# Patient Record
Sex: MALE | Race: BLACK OR AFRICAN AMERICAN | NOT HISPANIC OR LATINO | Employment: UNEMPLOYED | URBAN - METROPOLITAN AREA
[De-identification: names, ages, dates, MRNs, and addresses within clinical notes are randomized per-mention and may not be internally consistent; named-entity substitution may affect disease eponyms.]

---

## 2020-06-14 ENCOUNTER — MEDICAL CORRESPONDENCE (OUTPATIENT)
Dept: HEALTH INFORMATION MANAGEMENT | Facility: CLINIC | Age: 35
End: 2020-06-14

## 2020-06-25 ENCOUNTER — APPOINTMENT (OUTPATIENT)
Dept: ULTRASOUND IMAGING | Facility: CLINIC | Age: 35
End: 2020-06-25
Attending: INTERNAL MEDICINE
Payer: MEDICAID

## 2020-06-25 ENCOUNTER — APPOINTMENT (OUTPATIENT)
Dept: CARDIOLOGY | Facility: CLINIC | Age: 35
End: 2020-06-25
Attending: INTERNAL MEDICINE
Payer: MEDICAID

## 2020-06-25 ENCOUNTER — HOSPITAL ENCOUNTER (INPATIENT)
Facility: CLINIC | Age: 35
LOS: 5 days | Discharge: HOME | End: 2020-06-30
Attending: INTERNAL MEDICINE | Admitting: INTERNAL MEDICINE
Payer: MEDICAID

## 2020-06-25 DIAGNOSIS — N17.9 ACUTE KIDNEY INJURY (H): ICD-10-CM

## 2020-06-25 DIAGNOSIS — F10.20 ALCOHOL USE DISORDER, SEVERE, DEPENDENCE (H): Primary | ICD-10-CM

## 2020-06-25 PROBLEM — K70.11 ALCOHOLIC HEPATITIS WITH ASCITES (H): Status: ACTIVE | Noted: 2020-06-25

## 2020-06-25 LAB
ALBUMIN SERPL-MCNC: 1.6 G/DL (ref 3.4–5)
ALBUMIN SERPL-MCNC: 1.6 G/DL (ref 3.4–5)
ALBUMIN UR-MCNC: 30 MG/DL
ALP SERPL-CCNC: 377 U/L (ref 40–150)
ALT SERPL W P-5'-P-CCNC: 26 U/L (ref 0–70)
ANION GAP SERPL CALCULATED.3IONS-SCNC: 11 MMOL/L (ref 3–14)
APPEARANCE UR: ABNORMAL
AST SERPL W P-5'-P-CCNC: 101 U/L (ref 0–45)
BACTERIA #/AREA URNS HPF: ABNORMAL /HPF
BASOPHILS # BLD AUTO: 0.1 10E9/L (ref 0–0.2)
BASOPHILS NFR BLD AUTO: 0.4 %
BILIRUB SERPL-MCNC: 23.1 MG/DL (ref 0.2–1.3)
BILIRUB UR QL STRIP: ABNORMAL
BUN SERPL-MCNC: 56 MG/DL (ref 7–30)
CALCIUM SERPL-MCNC: 7.7 MG/DL (ref 8.5–10.1)
CHLORIDE SERPL-SCNC: 93 MMOL/L (ref 94–109)
CO2 SERPL-SCNC: 24 MMOL/L (ref 20–32)
COLOR UR AUTO: ABNORMAL
CREAT SERPL-MCNC: 4.86 MG/DL (ref 0.66–1.25)
DIFFERENTIAL METHOD BLD: ABNORMAL
EOSINOPHIL # BLD AUTO: 0.4 10E9/L (ref 0–0.7)
EOSINOPHIL NFR BLD AUTO: 1.7 %
ERYTHROCYTE [DISTWIDTH] IN BLOOD BY AUTOMATED COUNT: 17.6 % (ref 10–15)
GFR SERPL CREATININE-BSD FRML MDRD: 15 ML/MIN/{1.73_M2}
GLUCOSE SERPL-MCNC: 114 MG/DL (ref 70–99)
GLUCOSE UR STRIP-MCNC: NEGATIVE MG/DL
HCT VFR BLD AUTO: 26.2 % (ref 40–53)
HGB BLD-MCNC: 9.2 G/DL (ref 13.3–17.7)
HGB UR QL STRIP: ABNORMAL
HYALINE CASTS #/AREA URNS LPF: 9 /LPF (ref 0–2)
IMM GRANULOCYTES # BLD: 0.3 10E9/L (ref 0–0.4)
IMM GRANULOCYTES NFR BLD: 1 %
INR PPP: 1.9 (ref 0.86–1.14)
KETONES UR STRIP-MCNC: NEGATIVE MG/DL
LEUKOCYTE ESTERASE UR QL STRIP: ABNORMAL
LYMPHOCYTES # BLD AUTO: 1.2 10E9/L (ref 0.8–5.3)
LYMPHOCYTES NFR BLD AUTO: 4.8 %
MAGNESIUM SERPL-MCNC: 3.2 MG/DL (ref 1.6–2.3)
MCH RBC QN AUTO: 34.3 PG (ref 26.5–33)
MCHC RBC AUTO-ENTMCNC: 35.1 G/DL (ref 31.5–36.5)
MCV RBC AUTO: 98 FL (ref 78–100)
MONOCYTES # BLD AUTO: 1.7 10E9/L (ref 0–1.3)
MONOCYTES NFR BLD AUTO: 6.7 %
MUCOUS THREADS #/AREA URNS LPF: PRESENT /LPF
NEUTROPHILS # BLD AUTO: 21.5 10E9/L (ref 1.6–8.3)
NEUTROPHILS NFR BLD AUTO: 85.4 %
NITRATE UR QL: NEGATIVE
NRBC # BLD AUTO: 0 10*3/UL
NRBC BLD AUTO-RTO: 0 /100
NT-PROBNP SERPL-MCNC: 39 PG/ML (ref 0–450)
PH UR STRIP: 5.5 PH (ref 5–7)
PHOSPHATE SERPL-MCNC: 4.7 MG/DL (ref 2.5–4.5)
PLATELET # BLD AUTO: 290 10E9/L (ref 150–450)
POTASSIUM SERPL-SCNC: 3 MMOL/L (ref 3.4–5.3)
PROT SERPL-MCNC: 5 G/DL (ref 6.8–8.8)
PROT UR-MCNC: 0.91 G/L
PROT/CREAT 24H UR: 0.54 G/G CR (ref 0–0.2)
RBC # BLD AUTO: 2.68 10E12/L (ref 4.4–5.9)
RBC #/AREA URNS AUTO: <1 /HPF (ref 0–2)
SODIUM SERPL-SCNC: 128 MMOL/L (ref 133–144)
SODIUM UR-SCNC: 9 MMOL/L
SOURCE: ABNORMAL
SP GR UR STRIP: 1.02 (ref 1–1.03)
SQUAMOUS #/AREA URNS AUTO: 2 /HPF (ref 0–1)
UROBILINOGEN UR STRIP-MCNC: 4 MG/DL (ref 0–2)
WBC # BLD AUTO: 25.1 10E9/L (ref 4–11)
WBC #/AREA URNS AUTO: 19 /HPF (ref 0–5)

## 2020-06-25 PROCEDURE — 25000132 ZZH RX MED GY IP 250 OP 250 PS 637: Performed by: STUDENT IN AN ORGANIZED HEALTH CARE EDUCATION/TRAINING PROGRAM

## 2020-06-25 PROCEDURE — 84300 ASSAY OF URINE SODIUM: CPT | Performed by: INTERNAL MEDICINE

## 2020-06-25 PROCEDURE — 12000004 ZZH R&B IMCU UMMC

## 2020-06-25 PROCEDURE — 84132 ASSAY OF SERUM POTASSIUM: CPT | Performed by: INTERNAL MEDICINE

## 2020-06-25 PROCEDURE — 25800030 ZZH RX IP 258 OP 636: Performed by: STUDENT IN AN ORGANIZED HEALTH CARE EDUCATION/TRAINING PROGRAM

## 2020-06-25 PROCEDURE — 36415 COLL VENOUS BLD VENIPUNCTURE: CPT | Performed by: STUDENT IN AN ORGANIZED HEALTH CARE EDUCATION/TRAINING PROGRAM

## 2020-06-25 PROCEDURE — 85610 PROTHROMBIN TIME: CPT | Performed by: STUDENT IN AN ORGANIZED HEALTH CARE EDUCATION/TRAINING PROGRAM

## 2020-06-25 PROCEDURE — 80053 COMPREHEN METABOLIC PANEL: CPT | Performed by: STUDENT IN AN ORGANIZED HEALTH CARE EDUCATION/TRAINING PROGRAM

## 2020-06-25 PROCEDURE — 25000128 H RX IP 250 OP 636: Performed by: STUDENT IN AN ORGANIZED HEALTH CARE EDUCATION/TRAINING PROGRAM

## 2020-06-25 PROCEDURE — 93306 TTE W/DOPPLER COMPLETE: CPT

## 2020-06-25 PROCEDURE — 93975 VASCULAR STUDY: CPT | Mod: TC

## 2020-06-25 PROCEDURE — 82040 ASSAY OF SERUM ALBUMIN: CPT | Performed by: INTERNAL MEDICINE

## 2020-06-25 PROCEDURE — 83735 ASSAY OF MAGNESIUM: CPT | Performed by: STUDENT IN AN ORGANIZED HEALTH CARE EDUCATION/TRAINING PROGRAM

## 2020-06-25 PROCEDURE — P9047 ALBUMIN (HUMAN), 25%, 50ML: HCPCS | Performed by: STUDENT IN AN ORGANIZED HEALTH CARE EDUCATION/TRAINING PROGRAM

## 2020-06-25 PROCEDURE — 83880 ASSAY OF NATRIURETIC PEPTIDE: CPT | Performed by: STUDENT IN AN ORGANIZED HEALTH CARE EDUCATION/TRAINING PROGRAM

## 2020-06-25 PROCEDURE — 81001 URINALYSIS AUTO W/SCOPE: CPT | Performed by: INTERNAL MEDICINE

## 2020-06-25 PROCEDURE — 85025 COMPLETE CBC W/AUTO DIFF WBC: CPT | Performed by: STUDENT IN AN ORGANIZED HEALTH CARE EDUCATION/TRAINING PROGRAM

## 2020-06-25 PROCEDURE — 93306 TTE W/DOPPLER COMPLETE: CPT | Mod: 26 | Performed by: INTERNAL MEDICINE

## 2020-06-25 PROCEDURE — 36415 COLL VENOUS BLD VENIPUNCTURE: CPT | Performed by: INTERNAL MEDICINE

## 2020-06-25 PROCEDURE — 84100 ASSAY OF PHOSPHORUS: CPT | Performed by: STUDENT IN AN ORGANIZED HEALTH CARE EDUCATION/TRAINING PROGRAM

## 2020-06-25 PROCEDURE — 84156 ASSAY OF PROTEIN URINE: CPT | Performed by: INTERNAL MEDICINE

## 2020-06-25 RX ORDER — OXYCODONE HYDROCHLORIDE 5 MG/1
5 TABLET ORAL EVERY 4 HOURS PRN
Status: DISCONTINUED | OUTPATIENT
Start: 2020-06-25 | End: 2020-06-25

## 2020-06-25 RX ORDER — ONDANSETRON 2 MG/ML
4 INJECTION INTRAMUSCULAR; INTRAVENOUS EVERY 6 HOURS PRN
Status: DISCONTINUED | OUTPATIENT
Start: 2020-06-25 | End: 2020-06-30 | Stop reason: HOSPADM

## 2020-06-25 RX ORDER — PANTOPRAZOLE SODIUM 20 MG/1
40 TABLET, DELAYED RELEASE ORAL DAILY
COMMUNITY
Start: 2020-06-15 | End: 2024-05-30

## 2020-06-25 RX ORDER — AMOXICILLIN 500 MG/1
1000 CAPSULE ORAL 2 TIMES DAILY
Status: ON HOLD | COMMUNITY
Start: 2020-06-15 | End: 2020-06-30

## 2020-06-25 RX ORDER — NALOXONE HYDROCHLORIDE 0.4 MG/ML
.1-.4 INJECTION, SOLUTION INTRAMUSCULAR; INTRAVENOUS; SUBCUTANEOUS
Status: DISCONTINUED | OUTPATIENT
Start: 2020-06-25 | End: 2020-06-30 | Stop reason: HOSPADM

## 2020-06-25 RX ORDER — PANTOPRAZOLE SODIUM 40 MG/1
40 TABLET, DELAYED RELEASE ORAL DAILY
Status: DISCONTINUED | OUTPATIENT
Start: 2020-06-26 | End: 2020-06-30 | Stop reason: HOSPADM

## 2020-06-25 RX ORDER — LIDOCAINE 40 MG/G
CREAM TOPICAL
Status: DISCONTINUED | OUTPATIENT
Start: 2020-06-25 | End: 2020-06-30 | Stop reason: HOSPADM

## 2020-06-25 RX ORDER — ALBUMIN (HUMAN) 12.5 G/50ML
100 SOLUTION INTRAVENOUS EVERY 24 HOURS
Status: COMPLETED | OUTPATIENT
Start: 2020-06-25 | End: 2020-06-27

## 2020-06-25 RX ORDER — AMOXICILLIN 500 MG/1
500 CAPSULE ORAL 2 TIMES DAILY
Status: DISCONTINUED | OUTPATIENT
Start: 2020-06-25 | End: 2020-06-27

## 2020-06-25 RX ORDER — OXYCODONE HYDROCHLORIDE 5 MG/1
5-10 TABLET ORAL EVERY 4 HOURS PRN
Status: DISCONTINUED | OUTPATIENT
Start: 2020-06-25 | End: 2020-06-30 | Stop reason: HOSPADM

## 2020-06-25 RX ORDER — ACETAMINOPHEN 500 MG
500 TABLET ORAL EVERY 6 HOURS PRN
Status: DISCONTINUED | OUTPATIENT
Start: 2020-06-25 | End: 2020-06-30 | Stop reason: HOSPADM

## 2020-06-25 RX ORDER — COVID-19 ANTIGEN TEST
220 KIT MISCELLANEOUS PRN
Status: ON HOLD | COMMUNITY
End: 2020-06-30

## 2020-06-25 RX ORDER — POTASSIUM CHLORIDE 29.8 MG/ML
20 INJECTION INTRAVENOUS
Status: DISCONTINUED | OUTPATIENT
Start: 2020-06-25 | End: 2020-06-30 | Stop reason: HOSPADM

## 2020-06-25 RX ORDER — POTASSIUM CHLORIDE 1.5 G/1.58G
20-40 POWDER, FOR SOLUTION ORAL
Status: DISCONTINUED | OUTPATIENT
Start: 2020-06-25 | End: 2020-06-30 | Stop reason: HOSPADM

## 2020-06-25 RX ORDER — POTASSIUM CL/LIDO/0.9 % NACL 10MEQ/0.1L
10 INTRAVENOUS SOLUTION, PIGGYBACK (ML) INTRAVENOUS
Status: DISCONTINUED | OUTPATIENT
Start: 2020-06-25 | End: 2020-06-30 | Stop reason: HOSPADM

## 2020-06-25 RX ORDER — POTASSIUM CHLORIDE 7.45 MG/ML
10 INJECTION INTRAVENOUS
Status: DISCONTINUED | OUTPATIENT
Start: 2020-06-25 | End: 2020-06-30 | Stop reason: HOSPADM

## 2020-06-25 RX ORDER — POLYETHYLENE GLYCOL 3350 17 G/17G
17 POWDER, FOR SOLUTION ORAL DAILY PRN
Status: DISCONTINUED | OUTPATIENT
Start: 2020-06-25 | End: 2020-06-30 | Stop reason: HOSPADM

## 2020-06-25 RX ORDER — POTASSIUM CHLORIDE 750 MG/1
20-40 TABLET, EXTENDED RELEASE ORAL
Status: DISCONTINUED | OUTPATIENT
Start: 2020-06-25 | End: 2020-06-30 | Stop reason: HOSPADM

## 2020-06-25 RX ORDER — CLARITHROMYCIN 500 MG
500 TABLET ORAL 2 TIMES DAILY
Status: ON HOLD | COMMUNITY
Start: 2020-06-15 | End: 2020-06-30

## 2020-06-25 RX ORDER — ONDANSETRON 4 MG/1
4 TABLET, ORALLY DISINTEGRATING ORAL EVERY 6 HOURS PRN
Status: DISCONTINUED | OUTPATIENT
Start: 2020-06-25 | End: 2020-06-30 | Stop reason: HOSPADM

## 2020-06-25 RX ORDER — CLARITHROMYCIN 250 MG/1
250 TABLET, FILM COATED ORAL 2 TIMES DAILY
Status: DISCONTINUED | OUTPATIENT
Start: 2020-06-25 | End: 2020-06-27

## 2020-06-25 RX ADMIN — OXYCODONE HYDROCHLORIDE 5 MG: 5 TABLET ORAL at 16:04

## 2020-06-25 RX ADMIN — PHYTONADIONE 10 MG: 10 INJECTION, EMULSION INTRAMUSCULAR; INTRAVENOUS; SUBCUTANEOUS at 17:28

## 2020-06-25 RX ADMIN — POTASSIUM CHLORIDE 20 MEQ: 750 TABLET, EXTENDED RELEASE ORAL at 19:58

## 2020-06-25 RX ADMIN — ALBUMIN HUMAN 100 G: 0.25 SOLUTION INTRAVENOUS at 16:07

## 2020-06-25 RX ADMIN — OXYCODONE HYDROCHLORIDE 5 MG: 5 TABLET ORAL at 19:58

## 2020-06-25 RX ADMIN — POTASSIUM CHLORIDE 40 MEQ: 750 TABLET, EXTENDED RELEASE ORAL at 16:04

## 2020-06-25 RX ADMIN — OXYCODONE HYDROCHLORIDE 5 MG: 5 TABLET ORAL at 21:00

## 2020-06-25 RX ADMIN — CLARITHROMYCIN 250 MG: 250 TABLET ORAL at 19:58

## 2020-06-25 RX ADMIN — AMOXICILLIN 500 MG: 500 CAPSULE ORAL at 19:58

## 2020-06-25 ASSESSMENT — ACTIVITIES OF DAILY LIVING (ADL)
ADLS_ACUITY_SCORE: 10
ADLS_ACUITY_SCORE: 10

## 2020-06-25 ASSESSMENT — MIFFLIN-ST. JEOR: SCORE: 1948.13

## 2020-06-25 ASSESSMENT — PAIN DESCRIPTION - DESCRIPTORS
DESCRIPTORS: ACHING;CONSTANT
DESCRIPTORS: ACHING

## 2020-06-25 NOTE — PROGRESS NOTES
Psychosocial Assessment-Liver Transplant Team  Living Situation: Ian lives with his father Kory in an apartment in [place], [place].  Ian reports they have lived together for the past two years.  Ian' father drinks alcohol heavily, and this will be a difficult environment for patient to return to.  Ian reports he is independent with his personal cares and ambulation.  He does not drive due to past legal issues.  He denies being prescribed any medication prior to hospitalization, therefore difficult to assess his compliance with medication(s).  Education/Employment:  Ian graduated high school.  He is not a .  He had been working part-time at [employer] and part-time at [employer] as a .  Ian was laid off approximately six weeks ago due to COVID.  Financial /Income: Ian receives unemployment benefits.    Health Insurance:  Ian is currently uninsured, and has been since his health insurance lapsed in January of this year.  Ian reports he was previously on a [place] [facility] program.    An application for [place] [facility] Programs may have been started at [facility].  I have notified Bibi Rajput, [facility] , and she will follow up on this matter.  Family/Social Support: Ian has never  and he is not in a relationship.  He has an eight year old daughter who lives with her mother, and visits Ian on the weekends.      Ian reports his mother Bianca and sister Keke are his strongest supports, and who he would call upon for help.  His mother is at his bedside this afternoon.  Bianca and Keke live together and are moving to [place], [place] next week.  Chemical Dependency:  Ian smokes a pack of cigarettes every three days, and marijuana approximately once monthly.  He was vague in his report of alcohol consumption, but did report on average he was consuming alcohol five days of the week, and  "consuming 10 drinks of vodka per day up until two weeks ago.  Ian is unsure how long he had been consuming alcohol this heavily.  He reports no history of substance abuse treatment.  Ian states he has \"no interested in treatment,\" and further states \"I can do it on my own.\"  I asked him if he were willing to enter and complete a substance abuse treatment program if required by our transplant team, and he reports he will cross that bridge if or when he needs to.  Per Care Everywhere on 6/17/20: Alcohol use disorder  Patient reports heavy alcohol use, of 7-8 shots of alcohol 4-5x/week with recent decrease to about half of this amount. CIWA protocol in place while in hospital. No medications given, as scores were consistently 0. Patient declined Chemical Dependency and stated that he would seek services elsewhere.   - RECOMMENDATIONS: abstinence from all alcohol use   Mental Health: Ian denies any mental health history.  He specifically denies any history of suicidal ideation or hospitalization for mental health treatment.  Impression/Recommendations:   Ian does not understand the severity of his liver disease.  He has only abstained from alcohol for two weeks, and I would not recommend he be considered for liver transplantation given his poor incite and unwillingness to enter and complete a substance abuse treatment program.    This writer recommends Ian complete a substance use assessment and follow treatment recommendations to be considered for liver transplantation.  An inpatient consult has been ordered.  Additionally, an inpatient psychiatry consult has been ordered to further assess Ian' alcohol use history and mental health.      JAMES Quintero, [facility]  Liver Transplant   Phone [phone]  Pager [phone]   "

## 2020-06-25 NOTE — CONSULTS
Hepatology Consultation  Ian Crowley [id number] 34 year old 1985 6/25/2020        Date of Admission: 6/25/2020  Reason for consult: Alcoholic hepatitis  Requesting physician: Nate Dvual MD       Reason for Consultation:   Alcoholic hepatitis         Assessment and Plan:   Ian Crowley is a 34 year old male with PMHx of alcohol use disorder, tobacco use, HTN (not on anti-HTN) and gout who presented with jaundice, abdominal pain and renal injury.    #. Alcoholic Hepatitis:  #. Conjugated hyperbilirubinemia:   Patient presented to OSH with conjugated hyperbilirubinemia on 6/11 in the setting of alcohol overuse, likely secondary to alcohol related hepatitis. Abdominal US w/doppler without evidence of thrombosis - but coarse echotexture c/f fibrosis. Complications: ascites; no HE or EV/GV.  Workup thus far: Hepatitis A non-immune, not active. Hep B non-immune, not exposed. Hepatitis C non-reactive. F-actin and HERB wnl, thus less likely autoimmune etiology. Alpha 1 anti-trypsin elevated, no evidence of deficiency. Ferritin likely elevated 2/2 alcohol use. Ceruloplasmin wnl, unlikely Elvin disease.   MELD-Na: 40, 65% 90 day mortality    #. Ascites: SAAG < 1.1 on outside paracentetics (6/11) with total protein of 3.3, which is not typical of ascites due to portal HTN. 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     #. Transplant:   This is this patient's first known encounter with healthcare (6/2020). Based on the current information, this is the first presentation with liver disease related to alcohol use. He is a marginal liver transplant candidate in the setting of unclear insight into his AUD (especially given his family history of AUD and alcohol related cirrhosis).   Will need further evaluation per  to determine insight     #. Alcohol use  disorder: Ongoing alcohol use, 6/2020. No prior treatment. No history of DUIs. Family history of AUD.   #. Seizures, presumed 2/2 alcohol use  #. GERTRUDIS: SCr baseline 0.6-0.8, now SCr 5.11         Recommendations:   -- Recommend initiation of albumin challenge  -- F/u urine studies, including UA, urine Na and urine P/Cr; ordered for you  -- Repeat paracentesis (LESS THAN 2L given GERTRUDIS) with fluid studies: total protein, albumin, cell count w/diff and cultures; ordered for you  -- Recommend vitamin K IV 10mg x3 days (6/25-6/27)  -- Recommend TTE, please evaluate EF and IVC given at OSH was 55% and would expect a more hyperdynamic state  -- No indication for steroids at this time given SCr 5.11  -- Continue H. Pylori treatment  -- Transplant SW consulted to discuss: insight into alcohol use and treatment    Health Maintenance:  -- Hepatitis A and B non-immune, recommend initiation of vaccine initiation upon discharge    It has been a pleasure to participate in the care of this patient.  Patient discussed with Hepatology staff, Dr. Arnold.  Please do not hesitate to contact the Hepatology service with any questions or concerns.     Yolande Mota (Lizzie)  Gastroenterology/Hepatology Fellow  Pager [phone]    The patient was seen and examined.  The above assessment and plan was developed jointly with the fellow.      Moncho Arnold MD         HPI:   Ian Crowley is a 34 year old male with PMHx of alcohol use disorder, tobacco use, HTN (not on anti-HTN) and gout who presented with jaundice, abdominal pain and renal injury.  Starting on memorial day 2020, he started to have what was perceived to be seizures. He was having ongoing alcohol use, he was not trying to quit. No prior history of seizures, but father has epilepsy. During this episodes he was observed to have convulsions, emesis and urination. Last seizure was 6/7/2020. He is aware that his seizure are likely related to his alcohol use, but had ongoing  use.  He presented to [facility] on 6/11/2020 with abdominal discomfort and bloating for a week with nausea and dry heaving. He was having some streaks of blood with stool, red in color. He underwent a TTE, EEG, MRI brain, abdominal imaging and EGD as below. MRI brain without source of seizures. Abdominal imaging with fatty changes and concerns for cirrhosis, no clots. EGD + for H. Pylori, esophagitis and portal gastropathy; full report below. His SCr was wnl at that time. Tb was elevated to 14-18 and WBC was elevated to mid 20s with neutrophilic predominance. Blood cultures x2 with no growth. No e/o SBP on paracentesis fluid.    He presented again to an OSH with abdominal pain, distension and shortness of breath. S/p 4L paracentesis removed in the ED. Noted to have an GERTRUDIS to 5.1 with hypoNa. He has taken some PRN NSAIDs, but continues to urinate. Denies dysuria or hematuria. He was not on diuretics upon discharge from [facility]. Some nausea, but good PO intake and no recent emesis.        Past Medical History:   - HTN  - Gout  - Alcohol use disorder         Past Surgical History:   - No prior surgeries         Medications:     Medications Prior to Admission   Medication Sig Dispense Refill Last Dose     amoxicillin (AMOXIL) 500 MG capsule Take 1,000 mg by mouth 2 times daily        clarithromycin (BIAXIN) 500 MG tablet Take 500 mg by mouth 2 times daily        pantoprazole (PROTONIX) 20 MG EC tablet Take 40 mg by mouth daily   6/23/2020 at Unknown time     naproxen sodium 220 MG capsule Take 220 mg by mouth as needed   More than a month at Unknown time            Allergies   NKDA         Social History:   - Single, lives with father (with AUD)  - Drinking for 10+ years, drinks 10 shots (vodka + chaser) nightly, has been drinking more in the setting of COVID while he was off of work. No history of DUI. The longest period he has been off of alcohol for in the last 5 years was a week. Doesn't feel his alcohol use  "interferes with his daily life  - Tobacco use: 1 pack every 3 days  - Previously a  and sales associated, fired due to COVID; currently unemployed  - 2 sisters - one sister lives w/mother, he doesn't speak to the other sister  - Has an 8 year old daughter        Family History:   - Father: AUD  - Uncle: Alcohol related cirrhosis,   - No family history of liver disease or cancer         Review of Systems:   A complete 10 point review of systems was obtained.  Please see the HPI for pertinent positives and negatives.  All other systems were reviewed and were found to be negative.          Physical Exam:   VS:  /70 (BP Location: Left arm)   Pulse 90   Temp 98.8  F (37.1  C) (Oral)   Resp 18   Ht 1.803 m (5' 11\")   Wt 97.6 kg (215 lb 2.7 oz)   SpO2 100%   BMI 30.01 kg/m      Wt:   Wt Readings from Last 2 Encounters:   20 97.6 kg (215 lb 2.7 oz)      Constitutional: cooperative, pleasant, no acute distress  Eyes: Sclera icteric  HEENT: MMM  CV: RRR  Respiratory: CTAB  Abd: Obese, distended. Normoactive BS. No rebound or guarding  Skin: Jaundice. Palmar erythema  Talha: Trace Talha edema  Neuro: A&O x 3, No asterixis. No dysmetria with finger nose finger.          Laboratory:   2020:   Na: 127, SCr 5.11, Tb 23.8, Alk Phos 366, AST 84, ALT 22  INR: 2.09  WBC 26 (85% neutrophils), Hgb 9.0, plt 277    Peritoneal fluid :  , 20% neutrophils, total protein 2.5    Peritoneal fluid :  Total protein 3.3  Albumin 1.7 (serum albumin 2.7)         Imaging/Procedures/Studies:   Abdominal US w/doppler 20:  IMPRESSION:  1.  Fatty liver with coarse echotexture, correlate for fibrosis.  2.  Isolated gallbladder wall thickening  3.  trace ascites  4.  Normal abdominal liver duplex.    EGD 2020:  Impression:          - Normal upper third of esophagus and middle third of                        esophagus.                       - LA Grade A reflux esophagitis.                       - " Z-line regular.                       - Small hiatal hernia.                       - Diffuse moderately erythematous mucosa with few                        scattered small erosions was found in the gastric                        body and in the gastric antrum. Biopsies were taken                        with a cold forceps for Helicobacter pylori testing.                       - Portal hypertensive gastropathy.                       - Normal examined duodenum.                       - No esophageal or gastric varices                       - No old blood or current active bleeding noted  Recommendation:      - Await pathology results, treat and confirm                        eradication if H pylori positive                       - Avoid aspirin/NSAIDs                       - IV b.i.d. PPI while in house, p.o. once daily PPI                        on discharge                       - Reflux precautions                       - ETOH cessation                       - Other recommendations per GI consult note                       - GI will continue to follow    MR Abdominal Hepatobiliary Abd 6/12/2020:  1.  Enlarged, fatty liver. Smooth surface contour. No hepatic mass.  2.  Heterogeneous hepatic enhancement and mild periportal edema suggestive of acute hepatitis  3.  Mild splenomegaly  4.  Small volume ascites  5.  Indeterminate T11 vertebral body lesion, likely benign.    MR Brain w and w/o IV Contrast 6/12/20:  1.  No epileptogenic focus identified.  2.  Normal intra-axial contents.  3.  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  4.  The patient be asked to return for dedicated IAC images for further assessment.    TTE 6/13/2020:   1. Paroxysmal tachycardia with HRs up to 175 bpm during the study.     2. Calculated 3D LVEF 56 % . Normal LV size and systolic function.      Normal wall motion.     3. Normal RV size and function.     4. No significant valve disease.  During  tachycardia with HRs up to     160-175 bpm mean mitral gradient was 4 mm Hg, but normal with     slower HRs.     5. No prior study to compare.

## 2020-06-25 NOTE — PLAN OF CARE
"/67 (BP Location: Left arm)   Pulse 83   Temp 98  F (36.7  C) (Oral)   Resp 18   Ht 1.803 m (5' 11\")   Wt 97.6 kg (215 lb 2.7 oz)   SpO2 100%   BMI 30.01 kg/m      Neuro: A&Ox4.   Cardiac: SR. VSS.   Respiratory: Sating >95% on RA.  GI/: Waiting for pt to void. Need urine sample. No BM.   Diet/appetite: Tolerating low sodium diet.   Activity:  Assist of 1, up to chair and in halls.  Pain: At acceptable level on current regimen. Oxy given per MAR  Skin: No new deficits noted.  LDA's: PIVx1    Plan: Plan for para in the morning. ECHO and abdominal US tonight. Albumin challenge and vitamin k started. Continue with POC. Notify primary team with changes.    "

## 2020-06-25 NOTE — PROVIDER NOTIFICATION
DATE:  6/25/2020   TIME OF RECEIPT FROM LAB:  1620  LAB TEST:  Bilirubin Total  LAB VALUE:  23.1  RESULTS GIVEN WITH READ-BACK TO (PROVIDER):  Dr Laboy.   TIME LAB VALUE REPORTED TO PROVIDER:   7299 - No new orders at this time.

## 2020-06-25 NOTE — H&P
[facility], [place]    History and Physical - Sylvie 4 Service        Date of Admission:  6/25/2020    Assessment & Plan   34 year old male who is transferred from [facility] to the [facility] on 6/25 for further care by Hepatology team given rising Cr, total bilirubin, and increasing ascites.     Alcoholic hepatitis  Concern for alcoholic cirrhosis  Hyperbilirubinemia  Patient with first presentation with liver concerns on 6/11 with work up consistent with alcoholic hepatitis. RUQ with dopplers without PVT, but concerning for fibrosis. EGD did not show EV, but he had portal hypertensive gastropathy and H. pylori. He had paracentesis performed and was discharged on H. pylori triple therapy.   Presented again to [facility] with increased abdominal distension and pain. Found to have increased Cr to 5.1 (BL 0.8), increased bilirubin.     SAAG from outside labs of 1.1   MELD-NA of 40 (from OSH labs)    - Hepatology consulted, appreciate recs  - repeat paracentesis (less than 2L given GERTRUDIS) with fluid studies  - vitamin K 10 mg IV x 3 days (6/25 - 6/27)  - daily CMP, CBC and INR  - repeat TTE  - Transplant SW consulted (per GI)  - RUQ with dopplers  - immunization with Hep A/B upon discharge    GERTRUDIS  Hyponatremia  Concern for hepatorenal syndrome  Patient with reported decreased urine output since discharge from hospital on 6/15. He says he has been trying to drink plenty of fluids. Denies hematuria, dysuria. Given GERTRUDIS in setting of alcoholic hepatitis and likely cirrhosis, concern for hepatorenal syndrome.  - Hepatology consulted, appreciate recs  - Nephrology consulted, awaiting recs  - start 3 day albumin challenge with albumin 100 g daily x 3 days  - strict I/Os  - daily weights    Hypokalemia  Likely due to underlying liver disease and GERTRUDIS.   - potassium replacement protocol    H. pylori gastritis  - continue H. pylori treatment (PPI, amoxicillin, clarithromycin)    Severe  malnutrition   In the setting of underlying liver disease and poor oral nutrition.   Alb 1.6  - continue to monitor  - 2 gram sodium diet when able       Diet: NPO until after ultrasound  Fluids: None  DVT Prophylaxis: Pneumatic Compression Devices  Constantino Catheter: not present  Code Status: Full code         Disposition Plan   Expected discharge: 2 - 3 days, recommended to prior living arrangement once resolution of GERTRUDIS.  Entered: Derek Laboy MD 06/25/2020, 1:54 PM       The patient's care was discussed with the Attending Physician, Dr. Adán Ramon.    Derek Laboy MD  [address], [place]  Pager: [phone]  Please see sticky note for cross cover information  ______________________________________________________________________    Chief Complaint   Transferred from [facility] for alcoholic hepatitis with increasing WBCs, Cr, and total bilirubin.     History is obtained from the patient and the chart.     History of Present Illness   Ian Crowley is a 34 year old male who is transferred from [facility] to the [facility] on 6/25 for further care by Hepatology team given rising Cr, total bilirubin, and increasing ascites. Patient initially hospitalized at [facility] from 6/11 to 6/15 with concern of seizures that were felt could be due to his cutting back on alcohol use. He was also found to have alcoholic hepatitis complicated by ascites, esophagitis and portal hypertensive gastropathy. LFT pattern consistent with alcoholic hepatitis and additional work up negative for Hep A, B, C. F-actin, HERB and ceruloplasmin were unremarkable. He had and EGD that showed the esophagitis, portal hypertensive gastropathy, and H. pylori infection. He also had a RUQ w/dopplers that showed patent portal vein with antegrade flow. Patient was discharged to home where he noticed that his abdomen continued to get more distended and painful until he decided to  present to [facility] on 6/24.     At [facility], he was found to have an GERTRUDIS with Cr of 5.1 (was 0.8 on 6/14), increased bilirubin to 23.8 (was 18 on 6/14), increased WBC to 26 (was 23 on 6/14). Patient had a paracentesis on 6/24 with 4L taken off. Ascites labs were notable for SAAG of 1.1 and did not show SBP. Given, patient's complexity, it was felt he would be more appropriately served at either [facility] or [facility]. Patient was approved for transfer to [facility]. He was initially felt to not be a transplant candidate given his recent alcohol use.     On interview today at [facility], patient confirms that he presented to [facility] on 6/24 because of increasing abdominal distension and pain. This was relieved somewhat with paracentesis from 6/24, but he says his abdomen is already starting to feel more distended again. He denies SOB, chest pain, fevers, chills. He reports that he has had blood in his stools, but thinks it is because he has had constipation and has been straining a lot. He denies hematemesis.     He says he has been drinking alcohol since he was a teenager. He has never tried to stop drinking and has never been to a rehab program.        Review of Systems    The 10 point Review of Systems is negative other than noted in the HPI or here.     Past Medical History    I have reviewed this patient's medical history and updated it with pertinent information if needed.   No past medical history on file.       Past Surgical History   I have reviewed this patient's surgical history and updated it with pertinent information if needed.  No past surgical history on file.     Social History   I have reviewed this patient's social history and updated it with pertinent information if needed.   Patient smokes 0.5 PPD.   Was drinking 4-5 drinks/day most days of the week. Last drink >10 days ago.     Family History   I have reviewed this patient's family history and updated it with pertinent information if  needed.   No family history on file.    Prior to Admission Medications   None     Allergies   Allergies not on file    Physical Exam   Vital Signs: Temp: 98.8  F (37.1  C) Temp src: Oral BP: 102/70 Pulse: 90   Resp: 18 SpO2: 100 % O2 Device: None (Room air)    Weight: 215 lbs 2.7 oz    Gen: jaundiced male reclining in hospital bed in [facility], accompanied by his mother  HEENT: EOMI, icteric sclera  CV: RRR, nl S1/S2  Pulm: CTAB, non-labored breathing on RA  Abd: taut and distended, tender to palpation diffusely  Ext: wwp  Neuro: A&Ox3, no focal deficits    Data   Data reviewed today: I reviewed all medications, new labs and imaging results over the last 24 hours. I personally reviewed .

## 2020-06-25 NOTE — PROGRESS NOTES
Admission          6/25/2020  1:26 PM  -----------------------------------------------------------  Reason for admission: Alcoholic Cirrhosis and Kidney injury  Primary team notified of pt arrival.  Admitted from: [facility]  Via: stretcher  Accompanied by: alone  Belongings: Placed in closet; Medications sent to security  Admission Profile: complete  Teaching: orientation to unit and call light- call light within reach, call don't fall, use of console, meal times, when to call for the RN, and enforced importance of safety   Access: PIV  Telemetry:Placed on pt  Ht./Wt.: complete  Code Status verified on armband: yes  2 RN Skin Assessment Completed By: Sonia RN and Gracy RN    Bed surface reassessed with algorithm and charted: yes  New bed surface ordered: no    Pt status:    Temp:  [98.8  F (37.1  C)] 98.8  F (37.1  C)  Pulse:  [90] 90  Resp:  [18] 18  BP: (102)/(70) 102/70  SpO2:  [100 %] 100 %

## 2020-06-25 NOTE — CONSULTS
INTERVENTIONAL RADIOLOGY CONSULT    Ian Crowley is a 34 year old male with recent admission to [facility] for alcoholic withdrawal found to have ascites, esophagitis and portal hypertensive gastropathy seen on EGD now with increasing distention. IR has been consulted for diagnostic and therapeutic paracentesis with drain limit of 2 L.    Patient is on IR schedule tomorrow for a diagnostic and therapeutic paracentesis. Labs WNL for procedure. No NPO required. Consent will be done prior to procedure.     Discussed with Derek Laboy MD medicine.    Cody Brown PA-C  Interventional Radiology  [phone]

## 2020-06-26 ENCOUNTER — APPOINTMENT (OUTPATIENT)
Dept: INTERVENTIONAL RADIOLOGY/VASCULAR | Facility: CLINIC | Age: 35
End: 2020-06-26
Attending: PHYSICIAN ASSISTANT
Payer: MEDICAID

## 2020-06-26 LAB
ALBUMIN FLD-MCNC: 1.4 G/DL
ALBUMIN SERPL-MCNC: 2.7 G/DL (ref 3.4–5)
ALP SERPL-CCNC: 360 U/L (ref 40–150)
ALT SERPL W P-5'-P-CCNC: 25 U/L (ref 0–70)
ANION GAP SERPL CALCULATED.3IONS-SCNC: 10 MMOL/L (ref 3–14)
APPEARANCE FLD: CLEAR
AST SERPL W P-5'-P-CCNC: 113 U/L (ref 0–45)
BASOPHILS # BLD AUTO: 0.1 10E9/L (ref 0–0.2)
BASOPHILS NFR BLD AUTO: 0.4 %
BILIRUB SERPL-MCNC: 23.6 MG/DL (ref 0.2–1.3)
BUN SERPL-MCNC: 56 MG/DL (ref 7–30)
CALCIUM SERPL-MCNC: 8.1 MG/DL (ref 8.5–10.1)
CHLORIDE SERPL-SCNC: 95 MMOL/L (ref 94–109)
CO2 SERPL-SCNC: 24 MMOL/L (ref 20–32)
COLOR FLD: YELLOW
CREAT SERPL-MCNC: 4.17 MG/DL (ref 0.66–1.25)
DIFFERENTIAL METHOD BLD: ABNORMAL
EOSINOPHIL # BLD AUTO: 0.4 10E9/L (ref 0–0.7)
EOSINOPHIL NFR BLD AUTO: 1.9 %
ERYTHROCYTE [DISTWIDTH] IN BLOOD BY AUTOMATED COUNT: 18.4 % (ref 10–15)
GFR SERPL CREATININE-BSD FRML MDRD: 18 ML/MIN/{1.73_M2}
GLUCOSE SERPL-MCNC: 98 MG/DL (ref 70–99)
GRAM STN SPEC: NORMAL
HCT VFR BLD AUTO: 25.2 % (ref 40–53)
HGB BLD-MCNC: 8.5 G/DL (ref 13.3–17.7)
IMM GRANULOCYTES # BLD: 0.2 10E9/L (ref 0–0.4)
IMM GRANULOCYTES NFR BLD: 0.9 %
INR PPP: 1.84 (ref 0.86–1.14)
LYMPHOCYTES # BLD AUTO: 1.4 10E9/L (ref 0.8–5.3)
LYMPHOCYTES NFR BLD AUTO: 6.8 %
LYMPHOCYTES NFR FLD MANUAL: 16 %
MCH RBC QN AUTO: 33.6 PG (ref 26.5–33)
MCHC RBC AUTO-ENTMCNC: 33.7 G/DL (ref 31.5–36.5)
MCV RBC AUTO: 100 FL (ref 78–100)
MONOCYTES # BLD AUTO: 1.6 10E9/L (ref 0–1.3)
MONOCYTES NFR BLD AUTO: 7.8 %
NEUTROPHILS # BLD AUTO: 16.7 10E9/L (ref 1.6–8.3)
NEUTROPHILS NFR BLD AUTO: 82.2 %
NEUTS BAND NFR FLD MANUAL: 15 %
NRBC # BLD AUTO: 0 10*3/UL
NRBC BLD AUTO-RTO: 0 /100
OTHER CELLS FLD MANUAL: 69 %
PLATELET # BLD AUTO: 249 10E9/L (ref 150–450)
POTASSIUM SERPL-SCNC: 3.2 MMOL/L (ref 3.4–5.3)
POTASSIUM SERPL-SCNC: 3.5 MMOL/L (ref 3.4–5.3)
PROT FLD-MCNC: 2.8 G/DL
PROT SERPL-MCNC: 5.5 G/DL (ref 6.8–8.8)
RBC # BLD AUTO: 2.53 10E12/L (ref 4.4–5.9)
SODIUM SERPL-SCNC: 128 MMOL/L (ref 133–144)
SPECIMEN SOURCE FLD: NORMAL
SPECIMEN SOURCE: NORMAL
WBC # BLD AUTO: 20.3 10E9/L (ref 4–11)
WBC # FLD AUTO: 321 /UL

## 2020-06-26 PROCEDURE — 25000128 H RX IP 250 OP 636: Performed by: STUDENT IN AN ORGANIZED HEALTH CARE EDUCATION/TRAINING PROGRAM

## 2020-06-26 PROCEDURE — 99232 SBSQ HOSP IP/OBS MODERATE 35: CPT | Performed by: PSYCHIATRY & NEUROLOGY

## 2020-06-26 PROCEDURE — 0W9G3ZZ DRAINAGE OF PERITONEAL CAVITY, PERCUTANEOUS APPROACH: ICD-10-PCS | Performed by: PHYSICIAN ASSISTANT

## 2020-06-26 PROCEDURE — 88112 CYTOPATH CELL ENHANCE TECH: CPT | Performed by: INTERNAL MEDICINE

## 2020-06-26 PROCEDURE — 85610 PROTHROMBIN TIME: CPT | Performed by: STUDENT IN AN ORGANIZED HEALTH CARE EDUCATION/TRAINING PROGRAM

## 2020-06-26 PROCEDURE — 84157 ASSAY OF PROTEIN OTHER: CPT | Performed by: INTERNAL MEDICINE

## 2020-06-26 PROCEDURE — 40000893 ZZH STATISTIC PT IP EVAL DEFER

## 2020-06-26 PROCEDURE — 25000132 ZZH RX MED GY IP 250 OP 250 PS 637: Performed by: STUDENT IN AN ORGANIZED HEALTH CARE EDUCATION/TRAINING PROGRAM

## 2020-06-26 PROCEDURE — 25800030 ZZH RX IP 258 OP 636: Performed by: STUDENT IN AN ORGANIZED HEALTH CARE EDUCATION/TRAINING PROGRAM

## 2020-06-26 PROCEDURE — 25000125 ZZHC RX 250: Performed by: PHYSICIAN ASSISTANT

## 2020-06-26 PROCEDURE — 87015 SPECIMEN INFECT AGNT CONCNTJ: CPT | Performed by: INTERNAL MEDICINE

## 2020-06-26 PROCEDURE — 27210732 IR PARACENTESIS

## 2020-06-26 PROCEDURE — 40000894 ZZH STATISTIC OT IP EVAL DEFER: Performed by: OCCUPATIONAL THERAPIST

## 2020-06-26 PROCEDURE — 82042 OTHER SOURCE ALBUMIN QUAN EA: CPT | Performed by: INTERNAL MEDICINE

## 2020-06-26 PROCEDURE — 89051 BODY FLUID CELL COUNT: CPT | Performed by: INTERNAL MEDICINE

## 2020-06-26 PROCEDURE — 00000155 ZZHCL STATISTIC H-CELL BLOCK W/STAIN: Performed by: INTERNAL MEDICINE

## 2020-06-26 PROCEDURE — P9047 ALBUMIN (HUMAN), 25%, 50ML: HCPCS | Performed by: STUDENT IN AN ORGANIZED HEALTH CARE EDUCATION/TRAINING PROGRAM

## 2020-06-26 PROCEDURE — 87070 CULTURE OTHR SPECIMN AEROBIC: CPT | Performed by: INTERNAL MEDICINE

## 2020-06-26 PROCEDURE — 80053 COMPREHEN METABOLIC PANEL: CPT | Performed by: STUDENT IN AN ORGANIZED HEALTH CARE EDUCATION/TRAINING PROGRAM

## 2020-06-26 PROCEDURE — 36415 COLL VENOUS BLD VENIPUNCTURE: CPT | Performed by: STUDENT IN AN ORGANIZED HEALTH CARE EDUCATION/TRAINING PROGRAM

## 2020-06-26 PROCEDURE — 40000007 ZZH STATISTIC ADULT CD FACE TO FACE-NO CHRG

## 2020-06-26 PROCEDURE — 00000102 ZZHCL STATISTIC CYTO WRIGHT STAIN TC: Performed by: INTERNAL MEDICINE

## 2020-06-26 PROCEDURE — 87205 SMEAR GRAM STAIN: CPT | Performed by: INTERNAL MEDICINE

## 2020-06-26 PROCEDURE — 12000004 ZZH R&B IMCU UMMC

## 2020-06-26 PROCEDURE — 85025 COMPLETE CBC W/AUTO DIFF WBC: CPT | Performed by: STUDENT IN AN ORGANIZED HEALTH CARE EDUCATION/TRAINING PROGRAM

## 2020-06-26 PROCEDURE — 88305 TISSUE EXAM BY PATHOLOGIST: CPT | Performed by: INTERNAL MEDICINE

## 2020-06-26 PROCEDURE — 87075 CULTR BACTERIA EXCEPT BLOOD: CPT | Performed by: INTERNAL MEDICINE

## 2020-06-26 RX ORDER — DEXTROSE MONOHYDRATE 25 G/50ML
25-50 INJECTION, SOLUTION INTRAVENOUS
Status: DISCONTINUED | OUTPATIENT
Start: 2020-06-26 | End: 2020-06-30 | Stop reason: HOSPADM

## 2020-06-26 RX ORDER — NICOTINE POLACRILEX 4 MG
15-30 LOZENGE BUCCAL
Status: DISCONTINUED | OUTPATIENT
Start: 2020-06-26 | End: 2020-06-30 | Stop reason: HOSPADM

## 2020-06-26 RX ORDER — LIDOCAINE HYDROCHLORIDE 10 MG/ML
1-30 INJECTION, SOLUTION EPIDURAL; INFILTRATION; INTRACAUDAL; PERINEURAL
Status: COMPLETED | OUTPATIENT
Start: 2020-06-26 | End: 2020-06-26

## 2020-06-26 RX ADMIN — OXYCODONE HYDROCHLORIDE 10 MG: 5 TABLET ORAL at 05:28

## 2020-06-26 RX ADMIN — LIDOCAINE HYDROCHLORIDE 10 ML: 10 INJECTION, SOLUTION EPIDURAL; INFILTRATION; INTRACAUDAL; PERINEURAL at 12:12

## 2020-06-26 RX ADMIN — OXYCODONE HYDROCHLORIDE 10 MG: 5 TABLET ORAL at 17:37

## 2020-06-26 RX ADMIN — AMOXICILLIN 500 MG: 500 CAPSULE ORAL at 21:30

## 2020-06-26 RX ADMIN — PANTOPRAZOLE SODIUM 40 MG: 40 TABLET, DELAYED RELEASE ORAL at 09:02

## 2020-06-26 RX ADMIN — CLARITHROMYCIN 250 MG: 250 TABLET ORAL at 21:31

## 2020-06-26 RX ADMIN — OXYCODONE HYDROCHLORIDE 10 MG: 5 TABLET ORAL at 13:30

## 2020-06-26 RX ADMIN — ALBUMIN HUMAN 100 G: 0.25 SOLUTION INTRAVENOUS at 17:30

## 2020-06-26 RX ADMIN — POTASSIUM CHLORIDE 40 MEQ: 750 TABLET, EXTENDED RELEASE ORAL at 01:02

## 2020-06-26 RX ADMIN — OXYCODONE HYDROCHLORIDE 10 MG: 5 TABLET ORAL at 09:25

## 2020-06-26 RX ADMIN — OXYCODONE HYDROCHLORIDE 10 MG: 5 TABLET ORAL at 21:42

## 2020-06-26 RX ADMIN — OXYCODONE HYDROCHLORIDE 10 MG: 5 TABLET ORAL at 01:02

## 2020-06-26 RX ADMIN — PHYTONADIONE 10 MG: 10 INJECTION, EMULSION INTRAMUSCULAR; INTRAVENOUS; SUBCUTANEOUS at 16:27

## 2020-06-26 RX ADMIN — POTASSIUM CHLORIDE 20 MEQ: 750 TABLET, EXTENDED RELEASE ORAL at 03:04

## 2020-06-26 RX ADMIN — CLARITHROMYCIN 250 MG: 250 TABLET ORAL at 09:02

## 2020-06-26 RX ADMIN — AMOXICILLIN 500 MG: 500 CAPSULE ORAL at 09:02

## 2020-06-26 ASSESSMENT — ACTIVITIES OF DAILY LIVING (ADL)
ADLS_ACUITY_SCORE: 10

## 2020-06-26 ASSESSMENT — MIFFLIN-ST. JEOR: SCORE: 1960.52

## 2020-06-26 NOTE — PLAN OF CARE
Neuro: A&Ox4.   Cardiac: SR. VSS.   Respiratory: Sating >92%on RA.  GI/: 2 unmeasured occurrences of urine this morning; now aware to use urinal, voided 400 since, icteric. BM X1; reports red streaks (says this has been consistent and actually improving); being treated for H. Pylori. Abd distended, paracentesis with 1400 mL removed.  Fluid sent for labs.   Diet/appetite: Tolerating 2g Na diet. Eating well.  Activity:  Assist of up ad [place], up to chair and in halls.  Pain: At acceptable level on current regimen.  Oxy 10 mg q4hrs for abd pain.  Skin: No new deficits noted.  R para site c/d/I.  LDA's: R PIV    Plan: Continue with POC. Notify primary team with changes.
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
## 2020-06-26 NOTE — PLAN OF CARE
OT defer    Discharge Planner OT   Patient plan for discharge: home  Current status: OT: OT orders received, reviewed and completed. Per  discussion w/ PT and thorough chart review, pt w/ no OT needs at this time. Pt moving ind w/ PT, no cognitive concerns at this time per PT. OT will complete orders and cancel OT eval. Thank you for the referral.   Barriers to return to prior living situation: none per OT  Recommendations for discharge: defer to PT  Rationale for recommendations: defer to PT       Entered by: Pebbles Briscoe 06/26/2020 9:37 AM

## 2020-06-26 NOTE — PROGRESS NOTES
"CLINICAL NUTRITION SERVICES - ASSESSMENT NOTE     Nutrition Prescription    RECOMMENDATIONS FOR MDs/PROVIDERS TO ORDER:  Encourage oral intake  Recommend starting thiamine 100 mg, folic acid 1 mg and Thera-Vit (no minerals due to elevated liver enzymes    Malnutrition Status:    Unable to determine due to inability to complete all parameters of malnutrition    Recommendations already ordered by Registered Dietitian (RD):  Ordered Boost Plus daily, pt may order additional PRN      Future/Additional Recommendations:  Monitor oral intake and need for additional supplements      REASON FOR ASSESSMENT  Ian Crowley is a/an 34 year old male assessed by the dietitian for Admission Nutrition Risk Screen for reduced oral intake over the last month    CLINICAL HISTORY   Presented on  6/11 with work up consistent with alcoholic hepatitis.EGD did not show EV, but he had portal hypertensive gastropathy and H. Pylori. Also had paracentesis performed. Presented again to [facility] with increased abdominal distension and pain. Found to have increased Cr to 5.1 (BL 0.8), increased bilirubin.     NUTRITION HISTORY  Unable to obtain from patient due to IR procedure. Information obtained from chart.      CURRENT NUTRITION ORDERS  Diet: 2 g Sodium  Intake/Tolerance: 100%     LABS  Na 128 (L)  Urea Nitrogen 56 (H) Creatinine 4.17 (H)  Total Bilirubin 23.6 (H)  Alkaline phosphatase 360 (H)    MEDICATIONS  Protonix    ANTHROPOMETRICS  Height: 180.3 cm (5' 11\")  Most Recent Weight: 98.8 kg (217 lb 14.4 oz)    IBW: 78.2 kg  BMI: Obesity Grade I BMI 30-34.9  Weight History:   Wt Readings from Last 15 Encounters:   06/26/20 98.8 kg (217 lb 14.4 oz)     Dosing Weight: 83 kg (adjusted)     ASSESSED NUTRITION NEEDS  Estimated Energy Needs: 8416-4819+ kcals/day (25 - 30 kcals/kg)  Justification: Increased needs and Maintenance  Estimated Protein Needs: 108-125 grams protein/day (1.3 - 1.5 grams of pro/kg)  Justification: Increased " needs  Estimated Fluid Needs: 2075 mL/day (25 mL/kg)   Justification: Maintenance    PHYSICAL FINDINGS  Unable to obtain in-person nutrition history or nutrition focused physical assessment (NFPA) from patient as the number of staff going into rooms is restricted to limit exposure and to minimize use of PPE.    MALNUTRITION  % Intake: Unable to assess  % Weight Loss: Unable to assess  Subcutaneous Fat Loss: Unable to assess  Muscle Loss: Unable to assess  Fluid Accumulation/Edema: None noted  Malnutrition Diagnosis: Unable to determine due to inability to complete all parameters of malnutrition     NUTRITION DIAGNOSIS  Increased nutrient needs (protein) related to increased metabolic demand as evidenced by etoh hepatitis     INTERVENTIONS  Implementation  Medical food supplement therapy     Goals  Patient to consume % of nutritionally adequate meal trays TID, or the equivalent with supplements/snacks.     Monitoring/Evaluation  Progress toward goals will be monitored and evaluated per protocol.    Uma Cooley RD, LEYLA  6B pager: [phone]

## 2020-06-26 NOTE — PLAN OF CARE
"6B Defer PT Consult   Discharge Planner PT   Patient plan for discharge: home with dad  Current status: PT evaluation orders acknowledged and appreciated. Per chart review, pt demonstration, and RN, pt is mobilizing IND in hallway and negotiated stairs safely. 1 x overt LOBs 2/2 \"tripping\" on his flip flop. Pt has no concerns with discharge home. PT to complete orders.     Barriers to return to prior living situation: medical status  Recommendations for discharge: home with dad  Rationale for recommendations: pt is safe to return home when medically appropriate.     Thank you for your referral.       Entered by: Ruth Cordova 06/26/2020 9:44 AM       "

## 2020-06-26 NOTE — CONSULTS
BRIEF SOCIAL WORK NOTE:     SW consulted for chemical dependency. SW completed chart review. Pt was seen by Liver Transplant SW (Lj Hebert) on 6/25/2020 for psychosocial assessment and discussion of chemical dependency. See Lj Hebert's note for further information. Per chart review, Pt has no interest in treatment. No floor SW f/u indicated at this time. Please re-consult SW if further needs arise during this hospitalization.    JAMES Lang, LGSW  6B Intermediate Care Unit   [facility]  Phone: [phone]  Pager: [phone]

## 2020-06-26 NOTE — PHARMACY-ADMISSION MEDICATION HISTORY
Admission medication history interview status for the 6/25/2020 admission is complete. See Epic admission navigator for allergy information, pharmacy, prior to admission medications and immunization status.     Medication history interview sources:   -Reviewed medication histories conducted at [facility] by pharmacist on 6/24/2020 and 6/11/2020 (see Care Everywhere).  -Reviewed discharge summaries from [facility] for hospital admissions 6/11-15/2020 and 6/24-25/2020 (see Care Everywhere).    Changes made to PTA medication list (reason)  -No changes made     Additional medication history information:  -Patient received cefpodoxime from 6/15-18/2020 for UTI.  -Patient was not restarted on amoxicillin/clarithromycin for H.pylori treatment prior to transfer to [facility], but was given ceftriaxone IV 6/24 and 6/25/2020.    Prior to Admission medications    Medication Sig Last Dose Taking? Auth Provider   amoxicillin (AMOXIL) 500 MG capsule Take 1,000 mg by mouth 2 times daily 6/23/2020 Yes Reported, Patient   clarithromycin (BIAXIN) 500 MG tablet Take 500 mg by mouth 2 times daily 6/23/2020 Yes Reported, Patient   pantoprazole (PROTONIX) 20 MG EC tablet Take 40 mg by mouth daily 6/23/2020 at Unknown time Yes Reported, Patient   naproxen sodium 220 MG capsule Take 220 mg by mouth as needed More than a month at Unknown time  Reported, Patient     Medication history completed by:   Tosha Luciano, Pharm.D., [facility]  Pager [phone]

## 2020-06-26 NOTE — CONSULTS
"6/26/20 CD consult acknowledged  Per chart review, psychosocial assessment:  \"Ian states he has \"no interested in treatment,\" and further states \"I can do it on my own.\"  I asked him if he were willing to enter and complete a substance abuse treatment program if required by our transplant team, and he reports he will cross that bridge if or when he needs to.\"  At this time, pt has declined CD treatment resources, including evaluation to treatment therefore, consult will be closed.  If pt decides to seek CD treatment in the future, and still does not have insurance, [facility] will arrange a CD assessment and will make referrals to treatment.  [facility] Unit: [phone].    ANA Gagnon  [email or web address]  [phone]      "

## 2020-06-26 NOTE — PROGRESS NOTES
[facility], [place]    Progress Note - Sylvie Zuleta Service        Date of Admission:  6/25/2020    Assessment & Plan   34 year old male who is transferred from [facility] to the [facility] on 6/25 for further care by Hepatology team given rising Cr, total bilirubin, and increasing ascites.      Alcoholic hepatitis  Alcoholic cirrhosis  Hyperbilirubinemia  Alcohol use disorder  Patient with first presentation with liver concerns on 6/11 with work up consistent with alcoholic hepatitis. RUQ with dopplers without PVT, but concerning for fibrosis. EGD did not show EV, but he had portal hypertensive gastropathy and H. pylori. He had paracentesis performed and was discharged on H. pylori triple therapy.   Presented again to [facility] with increased abdominal distension and pain. Found to have increased Cr to 5.1 (BL 0.8), increased bilirubin.   RUQ on 6/25 final read pending, but doesn't appear to have significant ascites.   TTE with EF 60-65%.  SAAG of 1.3 on 6/26 - ascites due to portal hypertension    Transplant SW and Psychiatry have seen patient and he reports he is not willing to go through a chem dep rehab program, but will quit on his own.   Patient with poor insight into his alcohol use disorder.  - Hepatology consulted, appreciate recs  - vitamin K 10 mg IV x 3 days (6/25 - 6/27)  - daily CMP, CBC and INR  - immunization with Hep A/B upon discharge     GERTRUDIS  Hyponatremia  Concern for hepatorenal syndrome  Patient with reported decreased urine output since discharge from hospital on 6/15. He says he has been trying to drink plenty of fluids. Denies hematuria, dysuria. Given GERTRUDIS in setting of alcoholic hepatitis and likely cirrhosis, concern for hepatorenal syndrome less likely given creatinine trending down after albumin.  - Hepatology consulted, appreciate recs  - Nephrology consulted, awaiting recs  - albumin challenge with albumin 100 g daily x 3 days (6/25-6/27)  - strict  I/Os  - daily weights     Hypokalemia  Likely due to underlying liver disease and GERTRUDIS.   - potassium replacement protocol     H. pylori gastritis  - continue H. pylori treatment (PPI, amoxicillin, clarithromycin)     Severe malnutrition   In the setting of underlying liver disease and poor oral nutrition.   Alb 1.6  - continue to monitor  - 2 gram sodium diet      Diet: 2 gram Na diet  Fluids: None  DVT Prophylaxis: Pneumatic Compression Devices  Constantino Catheter: not present  Code Status: Full code           Disposition Plan   Expected discharge: 2-3 days, recommended to prior living arrangement once pending improvement in Cr.  Entered: Derek Laboy MD 06/26/2020, 1:31 PM       The patient's care was discussed with the Attending Physician, Dr. Ramon.    Derek Laboy MD  [address], [place]  Pager: [phone]  Please see sticky note for cross cover information  ______________________________________________________________________    Interval History   Patient requested increase in oxycodone to help with abdominal pain. This was increased from 5 to 5-10 mg Q4H PRN. Patient reported after this increase his pain was well controlled. He says he has had good urine output overnight. He denies confusion, chest pain, SOB, N/V.    4-point ROS is otherwise unremarkable.    Data reviewed today: I reviewed all medications, new labs and imaging results over the last 24 hours.    Physical Exam   Vital Signs: Temp: 98  F (36.7  C) Temp src: Oral BP: 124/76 Pulse: 92 Heart Rate: 88 Resp: 16 SpO2: 97 % O2 Device: None (Room air)    Weight: 217 lbs 14.4 oz    Gen: jaundiced patient reclining in bed in NAD  HEENT: icteric sclera  CV: RRR, nl S1/S2  Pulm: non-labored breathing on RA, CTAB  Abd: distended, taut, non-tender  Ext: trace bliateral LEAH    Data   Recent Labs   Lab 06/26/20  0455 06/25/20  2353 06/25/20  1525   WBC 20.3*  --  25.1*   HGB 8.5*  --  9.2*     --  98   PLT  249  --  290   INR 1.84*  --  1.90*   *  --  128*   POTASSIUM 3.5 3.2* 3.0*   CHLORIDE 95  --  93*   CO2 24  --  24   BUN 56*  --  56*   CR 4.17*  --  4.86*   ANIONGAP 10  --  11   NAUN 8.1*  --  7.7*   GLC 98  --  114*   ALBUMIN 2.7*  --  1.6*  1.6*   PROTTOTAL 5.5*  --  5.0*   BILITOTAL 23.6*  --  23.1*   ALKPHOS 360*  --  377*   ALT 25  --  26   *  --  101*     Recent Results (from the past 24 hour(s))   Echo Complete    Narrative    [id number]  [id number]  [id number]  [id number]^GEMINI^ABEL^[facility],[place]  Echocardiography Laboratory  [address] [place] [id number]     Name: AIDE DURÁN  MRN: [id number]  : 1985  Study Date: 2020 03:28 PM  Age: 34 yrs  Gender: Male  Patient Location: [facility]  Reason For Study: Edema  Ordering Physician: ABEL SINGLETARY  Referring Physician: SYSTEM, PROVIDER NOT IN  Performed By: Zafar Rai RDCS     BSA: 2.2 m2  Height: 71 in  Weight: 215 lb  HR: 84  BP: 102/70 mmHg  _____________________________________________________________________________  __        Procedure  Complete Portable Echo Adult.  _____________________________________________________________________________  __        Interpretation Summary  Global and regional left ventricular function is normal with an EF of 60-65%.  Global right ventricular function is normal.  Normal LV diastolic function with normal left and right-sided filling  pressures.  No significant valvular abnormalities.  No pericardial effusion is present.     There is no prior study for direct comparison.  _____________________________________________________________________________  __        Left Ventricle  Left ventricular function, chamber size, wall motion, and wall thickness are  normal.The EF is 60-65%. Left ventricular diastolic function is normal.  Diastolic Doppler findings (E/E' ratio and/or other parameters) suggest left  ventricular filling pressures  are normal. No regional wall motion  abnormalities are seen. A false tendon is noted.     Right Ventricle  The right ventricle is normal size. Global right ventricular function is  normal.     Atria  The right atria appears normal. Mild left atrial enlargement is present.     Mitral Valve  The mitral valve is normal. Trace mitral insufficiency is present.        Aortic Valve  Aortic valve is normal in structure and function. The aortic valve is  tricuspid. No aortic regurgitation is present.     Tricuspid Valve  The tricuspid valve is normal. Trace tricuspid insufficiency is present. The  peak velocity of the tricuspid regurgitant jet is not obtainable. Pulmonary  artery systolic pressure cannot be assessed.     Pulmonic Valve  The pulmonic valve is normal.     Vessels  The aorta root is normal. The thoracic aorta is normal. IVC diameter and  respiratory changes fall into an intermediate range suggesting an RA pressure  of 8 mmHg.     Pericardium  No pericardial effusion is present.        Compared to Previous Study  There is no prior study for direct comparison.     Attestation  I have personally viewed the imaging and agree with the interpretation and  report as documented by the fellow, Destiny Pelletier MD, and/or edited by me.  _____________________________________________________________________________  __     MMode/2D Measurements & Calculations  IVSd: 1.1 cm  LVIDd: 5.2 cm  LVIDs: 3.1 cm  LVPWd: 0.72 cm  FS: 40.8 %  LV mass(C)d: 173.5 grams  LV mass(C)dI: 79.8 grams/m2  asc Aorta Diam: 3.2 cm  LVOT diam: 2.3 cm  LVOT area: 4.3 cm2  LA Volume Index (BP): 38.3 ml/m2  RWT: 0.28     TAPSE: 1.9 cm        Doppler Measurements & Calculations  MV E max rei: 61.5 cm/sec  MV A max rei: 55.5 cm/sec  MV E/A: 1.1  MV dec slope: 313.5 cm/sec2  MV dec time: 0.20 sec  PA acc time: 0.12 sec  E/E' av.1  Lateral E/e': 5.1  Medial E/e': 5.2      _____________________________________________________________________________  __           Report approved by: Chaitanya Yu 06/25/2020 04:12 PM        Medications       albumin human  100 g Intravenous Q24H     amoxicillin  500 mg Oral BID     clarithromycin  250 mg Oral BID     pantoprazole  40 mg Oral Daily     phytonadione  10 mg Intravenous Daily at 4 pm     sodium chloride (PF)  3 mL Intracatheter Q8H

## 2020-06-26 NOTE — PLAN OF CARE
"Neuro: A&Ox4. Afebrile. Denies headache.  Cardiac: SR. VSS.   Respiratory: Sating >95 on RA.  GI/: Adequate urine output. BM X1 last evening.  Diet/appetite: Tolerating 2g low sodium diet. Eating well.  Activity:  Independent in the room, up to chair and in halls.  Pain: At acceptable level on current regimen. Abdominal pain improved with oxycodone Q4 hours.  Skin: No new deficits noted.  LDA's: PIVx1 on R forearm.    /68 (BP Location: Left arm)   Pulse 85   Temp 97.2  F (36.2  C) (Axillary)   Resp 18   Ht 1.803 m (5' 11\")   Wt 98.8 kg (217 lb 14.4 oz)   SpO2 99%   BMI 30.39 kg/m      Plan: Paracentesis planned for today. Continue with POC. Notify primary team with changes.    "

## 2020-06-26 NOTE — PROCEDURES
[facility], [place]    Procedure: IR Procedure Note    Date/Time: 6/26/2020 12:12 PM  Performed by: Christohper Cervantes PA-C  Authorized by: Christopher Cervantes PA-C     UNIVERSAL PROTOCOL   Site Marked: NA  Prior Images Obtained and Reviewed:  Yes  Required items: Required blood products, implants, devices and special equipment available    Patient identity confirmed:  Verbally with patient, arm band, provided demographic data and hospital-assigned identification number  Patient was reevaluated immediately before administering moderate or deep sedation or anesthesia  Confirmation Checklist:  Patient's identity using two indicators, relevant allergies, procedure was appropriate and matched the consent or emergent situation and correct equipment/implants were available  Time out: Immediately prior to the procedure a time out was called    Universal Protocol: the Joint Commission Universal Protocol was followed    Preparation: Patient was prepped and draped in usual sterile fashion    ESBL (mL):  1         ANESTHESIA    Anesthesia: Local infiltration  Local Anesthetic:  Lidocaine 1% without epinephrine  Anesthetic Total (mL):  10      SEDATION    Patient Sedated: No    See dictated procedure note for full details.  Findings: U/S guided diagnostic and therapeutic paracentesis, with return of clear yellow fluid. 120 ml aspirated and sent for labs as ordered.    Specimens: fluid and/or tissue for laboratory analysis    Complications: None    Condition: Stable    Plan: Follow up per primary team. No strenuous activity for 3 days.    PROCEDURE   Patient Tolerance:  Patient tolerated the procedure well with no immediate complications    Length of time physician/provider present for 1:1 monitoring during sedation: 0

## 2020-06-26 NOTE — PROGRESS NOTES
Nephrology Initial Consult  June 26, 2020      Ian Crowley MRN:[id number] YOB: 1985  Date of Admission:6/25/2020  Primary care provider: No Ref-Primary, Physician  Requesting physician: Adán Ramon MD    ASSESSMENT AND RECOMMENDATIONS:   GERTRUDIS-Baseline Cr of 0.75 on 6/11 which was the first check historically, normal imaging by US during this admission.  Cr peaked at 5.1 yesterday, on the decline with treatment of suspected SBP and is now 4.2 this am.  Urine Na 9, 0.5g/g proteinuria likely related to GERTRUDIS.  UA showed large bili which may be contributing.  Will chart check tomorrow, Cr is improving and he is making urine, no secondary issues with labs.  Long term prognosis still tenuous with MELD of 40 and hesitation to go to CD treatment.     -Cr improving, would continue albumin 100g daily   -Diagnostic tap done today, continuing to treat suspected SBP.    -Urine Na suggests possible HRS, BP's are higher than expected.      Volume-Minimal edema, tap done today for diagnostic purposes, only pulled 120cc, still making UOP.   ECHO yesterday showed intermediate IVC with estimated RA of 8mmHg, continuing albumin, no SOB.      Electrolytes/pH-K 3.5, bicarb 24, no acute issues.     BMD-Ca 8.1, Mg and phos mildly up yesterday.     Anemia-Hgb 8.5, unclear chronology, acute management per team.      Seen and discussed with Dr Andrade    Recommendations were communicated to primary team via note      FRACISCO Jones CNS  Clinical Nurse Specialist  [phone]    REASON FOR CONSULT: Requested to evaluate 34 yom for management of GERTRUDIS in setting of ETOH cirrhosis and ? SBP.     HISTORY OF PRESENT ILLNESS:  Ian Crowley is a 34 yom with lack of medical care up until 6/11 who presents with GERTRUDIS.  Presented to OSH on 6/11 with withdrawal symptoms, Cr was 0.8 at that time, now readmitted with SBP symptoms on 6/25 with abd pain and jaundice with Cr up to 5.1.  Nephrology consulted for  management of GERTRUDIS.        PAST MEDICAL HISTORY:  ETOH Cirrhosis  Gout    MEDICATIONS:  PTA Meds  Prior to Admission medications    Medication Sig Last Dose Taking? Auth Provider   amoxicillin (AMOXIL) 500 MG capsule Take 1,000 mg by mouth 2 times daily 6/23/2020 Yes Reported, Patient   clarithromycin (BIAXIN) 500 MG tablet Take 500 mg by mouth 2 times daily 6/23/2020 Yes Reported, Patient   pantoprazole (PROTONIX) 20 MG EC tablet Take 40 mg by mouth daily 6/23/2020 at Unknown time Yes Reported, Patient   naproxen sodium 220 MG capsule Take 220 mg by mouth as needed More than a month at Unknown time  Reported, Patient      Current Meds    albumin human  100 g Intravenous Q24H     amoxicillin  500 mg Oral BID     clarithromycin  250 mg Oral BID     pantoprazole  40 mg Oral Daily     phytonadione  10 mg Intravenous Daily at 4 pm     sodium chloride (PF)  3 mL Intracatheter Q8H     Infusion Meds      ALLERGIES:    No Known Allergies    REVIEW OF SYSTEMS:  A 10 point review of systems was negative except as noted above.    SOCIAL HISTORY:   Social History     Socioeconomic History     Marital status: Single     Spouse name: Not on file     Number of children: Not on file     Years of education: Not on file     Highest education level: Not on file   Occupational History     Not on file   Social Needs     Financial resource strain: Not on file     Food insecurity     Worry: Not on file     Inability: Not on file     Transportation needs     Medical: Not on file     Non-medical: Not on file   Tobacco Use     Smoking status: Not on file   Substance and Sexual Activity     Alcohol use: Not on file     Drug use: Not on file     Sexual activity: Not on file   Lifestyle     Physical activity     Days per week: Not on file     Minutes per session: Not on file     Stress: Not on file   Relationships     Social connections     Talks on phone: Not on file     Gets together: Not on file     Attends Muslim service: Not on file      "Active member of club or organization: Not on file     Attends meetings of clubs or organizations: Not on file     Relationship status: Not on file     Intimate partner violence     Fear of current or ex partner: Not on file     Emotionally abused: Not on file     Physically abused: Not on file     Forced sexual activity: Not on file   Other Topics Concern     Not on file   Social History Narrative     Not on file     Reviewed with patient    FAMILY MEDICAL HISTORY:   No family hx of renal failure.     PHYSICAL EXAM:   Temp  Av  F (36.7  C)  Min: 97.2  F (36.2  C)  Max: 98.8  F (37.1  C)      Pulse  Av.3  Min: 83  Max: 92 Resp  Av.2  Min: 12  Max: 20  SpO2  Av.2 %  Min: 97 %  Max: 100 %       /76   Pulse 92   Temp 98  F (36.7  C) (Oral)   Resp 16   Ht 1.803 m (5' 11\")   Wt 98.8 kg (217 lb 14.4 oz)   SpO2 97%   BMI 30.39 kg/m     Date 20 0700 - 20 0659   Shift 5882-8936 2328-9788 9086-1184 24 Hour Total   INTAKE   P.O. 540   540   Shift Total(mL/kg) 540(5.46)   540(5.46)   OUTPUT   Urine 400   400   Drains 1400   1400   Shift Total(mL/kg) 1800(18.21)   1800(18.21)   Weight (kg) 98.84 98.84 98.84 98.84      Admit Weight: 97.6 kg (215 lb 2.7 oz)     GENERAL APPEARANCE: alert and no distress  EYES: + scleral icterus  NECK:  No JVD  Pulmonary: lungs clear to auscultation with equal breath sounds bilaterally, no clubbing or cyanosis  CV: Regular rhythm, normal rate, no rub   - JVP Not elevated   - Edema +1 peripheral, some ascites abdominally.   GI: soft, nontender, normal bowel sounds  MS: no evidence of inflammation in joints, no muscle tenderness  : No Constantino  SKIN: no rash, warm, dry  NEURO: mentation intact and speech normal    LABS:   CMP  Recent Labs   Lab 20  0455 20  2353 20  1525   *  --  128*   POTASSIUM 3.5 3.2* 3.0*   CHLORIDE 95  --  93*   CO2 24  --  24   ANIONGAP 10  --  11   GLC 98  --  114*   BUN 56*  --  56*   CR 4.17*  --  4.86* "   GFRESTIMATED 18*  --  15*   GFRESTBLACK 20*  --  17*   NAUN 8.1*  --  7.7*   MAG  --   --  3.2*   PHOS  --   --  4.7*   PROTTOTAL 5.5*  --  5.0*   ALBUMIN 2.7*  --  1.6*  1.6*   BILITOTAL 23.6*  --  23.1*   ALKPHOS 360*  --  377*   *  --  101*   ALT 25  --  26     CBC  Recent Labs   Lab 06/26/20  0455 06/25/20  1525   HGB 8.5* 9.2*   WBC 20.3* 25.1*   RBC 2.53* 2.68*   HCT 25.2* 26.2*    98   MCH 33.6* 34.3*   MCHC 33.7 35.1   RDW 18.4* 17.6*    290     INR  Recent Labs   Lab 06/26/20  0455 06/25/20  1525   INR 1.84* 1.90*     ABGNo lab results found in last 7 days.   URINE STUDIES  Recent Labs   Lab Test 06/25/20 2000   COLOR Orange   APPEARANCE Slightly Cloudy   URINEGLC Negative   URINEBILI Large*   URINEKETONE Negative   SG 1.016   UBLD Trace*   URINEPH 5.5   PROTEIN 30*   NITRITE Negative   LEUKEST Small*   RBCU <1   WBCU 19*     Recent Labs   Lab Test 06/25/20 2000   UTPG 0.54*     PTH  No lab results found.  IRON STUDIES  No lab results found.

## 2020-06-26 NOTE — IR NOTE
Patient Name: Ian Crowley  Medical Record Number: [id number]  Today's Date: 6/26/2020    Procedure: Diagnostic and therapeutic paracentesis  Proceduralist: Luis Cervantes PA-C    Procedure Start: 1159  Procedure end: 1212  Sedation medications administered: local 1% lidocaine     Report given to: Grey POWELL 6B  : LARON    Other Notes: Pt arrived to IR room 7 from . Consent reviewed. Pt denies any questions or concerns regarding procedure. Pt positioned supine and monitored per protocol. Pt tolerated procedure without any noted complications. Pt transferred back to .    1,400 ml ascites removed.  Labs sent.

## 2020-06-26 NOTE — PROVIDER NOTIFICATION
Time of notification: 8:30 PM  Provider notified: Sylvie Lin intern  Patient status: Patient reporting in adequate pain control  Temp:  [97.9  F (36.6  C)-98.8  F (37.1  C)] 97.9  F (36.6  C)  Pulse:  [83-90] 87  Resp:  [18] 18  BP: (102-120)/(50-70) 103/50  SpO2:  [98 %-100 %] 98 %  Orders received: Oxycodone increased to 5-10mg Q4h

## 2020-06-26 NOTE — PROGRESS NOTES
Hepatology Follow up Note           Assessment and Plan:   Ian Crowley is a 34 year old male with PMHx of alcohol use disorder, tobacco use, HTN (not on anti-HTN) and gout who presented with jaundice, abdominal pain and renal injury.     #. Alcoholic Hepatitis  #. Conjugated hyperbilirubinemia  Patient presented to OSH with conjugated hyperbilirubinemia on 6/11 in the setting of alcohol overuse, likely secondary to alcohol related hepatitis. Abdominal US w/doppler without evidence of thrombosis - but coarse echotexture c/f fibrosis. Complications: ascites; no HE or EV/GV.  MELD-Na: 39 (6/26/20)     #. Ascites: TTE with normal EF on 6/25, see below. SAAG > 1.1 on repeat paracentesis at [facility] on 6/26, no evidence of SBP. Total protein 2.8 (peritoneal fluid).      #. Transplant  At this time, the patient is NOT a transplant candidate because he lacks insight into his alcohol use disorder and is not interested in chemical dependency treatment at this time. Discussed the severity of the patient's illness with the patient extensively today given elevated MELD and significant GERTRUDIS, patient with minimal insight into the severity of his illness.     #. Alcohol use disorder  #. Seizures, presumed 2/2 alcohol use  #. GERTRUDIS, proteinuria  #. Hyponatremia         Recommendations:   -- F/u paracentesis cultures and cytology  -- Continue albumin for GERTRUDIS, appreciate nephrology assistance   -- Continue vitamin K 10mg IV x 3 days (6/25-6/27)  -- Continue PPI, amoxicillin and clarithromycin for H. Pylori treatment  -- Recommend chemical dependency when the patient is ready/willing    Health Maintenance:  -- Hepatitis A and B non-immune, recommend initiation of vaccine initiation upon discharge    Follow up:  -- Patient lives in [place] and would like to establish care with hepatology at [facility] upon discharge, please refer to Dr. Mota's clinic (with Dr. Leventhal)    Patient discussed with Hepatology staff, Dr. Arnold.  Please do  "not hesitate to contact the Hepatology service with any questions or concerns.     Yolande Mota (Lizzie)  Gastroenterology/Hepatology Fellow  Pager [phone]    The patient was seen and examined.  The above assessment and plan was developed jointly with the fellow.      Moncho Arnold MD         Subjective/Objective:   - No acute events overnight  - Denies fevers, chills, chest pain or shortness of breath  - Feels better following the paracentesis  - Minimal Talha swelling         Medications:     Current Facility-Administered Medications   Medication     acetaminophen (TYLENOL) tablet 500 mg     albumin human 25 % injection 100 g     amoxicillin (AMOXIL) capsule 500 mg     clarithromycin (BIAXIN) tablet 250 mg     glucose gel 15-30 g    Or     dextrose 50 % injection 25-50 mL    Or     glucagon injection 1 mg     lidocaine (LMX4) cream     lidocaine 1 % 0.1-1 mL     melatonin tablet 1 mg     naloxone (NARCAN) injection 0.1-0.4 mg     ondansetron (ZOFRAN-ODT) ODT tab 4 mg    Or     ondansetron (ZOFRAN) injection 4 mg     oxyCODONE (ROXICODONE) tablet 5-10 mg     pantoprazole (PROTONIX) EC tablet 40 mg     phytonadione (AQUA-MEPHYTON) 10 mg in sodium chloride 0.9 % 50 mL intermittent infusion     polyethylene glycol (MIRALAX) Packet 17 g     potassium chloride (KLOR-CON) Packet 20-40 mEq     potassium chloride 10 mEq in 100 mL intermittent infusion with 10 mg lidocaine     potassium chloride 10 mEq in 100 mL sterile water intermittent infusion (premix)     potassium chloride 20 mEq in 50 mL intermittent infusion     potassium chloride ER (KLOR-CON M) CR tablet 20-40 mEq     sodium chloride (PF) 0.9% PF flush 3 mL     sodium chloride (PF) 0.9% PF flush 3 mL            Physical Exam:   VS:  /76   Pulse 92   Temp 98  F (36.7  C) (Oral)   Resp 16   Ht 1.803 m (5' 11\")   Wt 98.8 kg (217 lb 14.4 oz)   SpO2 97%   BMI 30.39 kg/m      Wt:   Wt Readings from Last 2 Encounters:   06/26/20 98.8 kg (217 lb 14.4 oz)    "   Constitutional: cooperative, pleasant, no acute distress  Eyes: Sclera icteric  HEENT: MMM  CV: RRR  Respiratory: CTAB  Abd: Obese, distended. Normoactive BS. No rebound or guarding  Skin: Jaundice. Palmar erythema  Talha: Trace Talha edema  Neuro: A&O x 3. No asterixis.         Laboratory:   BMP  Recent Labs   Lab 06/26/20  0455 06/25/20  2353 06/25/20  1525   *  --  128*   POTASSIUM 3.5 3.2* 3.0*   CHLORIDE 95  --  93*   NAUN 8.1*  --  7.7*   CO2 24  --  24   BUN 56*  --  56*   CR 4.17*  --  4.86*   GLC 98  --  114*     CBC  Recent Labs   Lab 06/26/20  0455 06/25/20  1525   WBC 20.3* 25.1*   RBC 2.53* 2.68*   HGB 8.5* 9.2*   HCT 25.2* 26.2*    98   MCH 33.6* 34.3*   MCHC 33.7 35.1   RDW 18.4* 17.6*    290     INR  Recent Labs   Lab 06/26/20  0455 06/25/20  1525   INR 1.84* 1.90*     LFTs  Recent Labs   Lab 06/26/20  0455 06/25/20  1525   ALKPHOS 360* 377*   * 101*   ALT 25 26   BILITOTAL 23.6* 23.1*   PROTTOTAL 5.5* 5.0*   ALBUMIN 2.7* 1.6*  1.6*           Imaging/Procedures/Studies:   Abdominal US 6/25/20:  1. Cirrhotic morphology of the liver with bidirectional, predominantly  retrograde flow in the main and right portal veins likely representing  pleural hypertension.  2. Splenomegaly, likely secondary to above.  3. Biliary sludge without evidence of cholecystitis    TTE 6/25/20:  Global and regional left ventricular function is normal with an EF of 60-65%.  Global right ventricular function is normal.  Normal LV diastolic function with normal left and right-sided filling  pressures.  No significant valvular abnormalities.  No pericardial effusion is present.

## 2020-06-27 LAB
ALBUMIN SERPL-MCNC: 3.2 G/DL (ref 3.4–5)
ALP SERPL-CCNC: 312 U/L (ref 40–150)
ALT SERPL W P-5'-P-CCNC: 25 U/L (ref 0–70)
ANION GAP SERPL CALCULATED.3IONS-SCNC: 9 MMOL/L (ref 3–14)
AST SERPL W P-5'-P-CCNC: 112 U/L (ref 0–45)
BILIRUB SERPL-MCNC: 23.9 MG/DL (ref 0.2–1.3)
BUN SERPL-MCNC: 48 MG/DL (ref 7–30)
CALCIUM SERPL-MCNC: 8.3 MG/DL (ref 8.5–10.1)
CHLORIDE SERPL-SCNC: 96 MMOL/L (ref 94–109)
CO2 SERPL-SCNC: 24 MMOL/L (ref 20–32)
CREAT SERPL-MCNC: 3.19 MG/DL (ref 0.66–1.25)
ERYTHROCYTE [DISTWIDTH] IN BLOOD BY AUTOMATED COUNT: 18.1 % (ref 10–15)
GFR SERPL CREATININE-BSD FRML MDRD: 24 ML/MIN/{1.73_M2}
GLUCOSE SERPL-MCNC: 116 MG/DL (ref 70–99)
HCT VFR BLD AUTO: 23.1 % (ref 40–53)
HGB BLD-MCNC: 7.9 G/DL (ref 13.3–17.7)
INR PPP: 1.87 (ref 0.86–1.14)
MCH RBC QN AUTO: 34.3 PG (ref 26.5–33)
MCHC RBC AUTO-ENTMCNC: 34.2 G/DL (ref 31.5–36.5)
MCV RBC AUTO: 100 FL (ref 78–100)
PLATELET # BLD AUTO: 199 10E9/L (ref 150–450)
POTASSIUM SERPL-SCNC: 3.2 MMOL/L (ref 3.4–5.3)
POTASSIUM SERPL-SCNC: 3.3 MMOL/L (ref 3.4–5.3)
PROT SERPL-MCNC: 5.7 G/DL (ref 6.8–8.8)
RBC # BLD AUTO: 2.3 10E12/L (ref 4.4–5.9)
SODIUM SERPL-SCNC: 129 MMOL/L (ref 133–144)
WBC # BLD AUTO: 18 10E9/L (ref 4–11)

## 2020-06-27 PROCEDURE — 36415 COLL VENOUS BLD VENIPUNCTURE: CPT | Performed by: NURSE PRACTITIONER

## 2020-06-27 PROCEDURE — 25000132 ZZH RX MED GY IP 250 OP 250 PS 637: Performed by: STUDENT IN AN ORGANIZED HEALTH CARE EDUCATION/TRAINING PROGRAM

## 2020-06-27 PROCEDURE — 25000132 ZZH RX MED GY IP 250 OP 250 PS 637: Performed by: INTERNAL MEDICINE

## 2020-06-27 PROCEDURE — 85027 COMPLETE CBC AUTOMATED: CPT | Performed by: STUDENT IN AN ORGANIZED HEALTH CARE EDUCATION/TRAINING PROGRAM

## 2020-06-27 PROCEDURE — 93010 ELECTROCARDIOGRAM REPORT: CPT | Performed by: INTERNAL MEDICINE

## 2020-06-27 PROCEDURE — 84132 ASSAY OF SERUM POTASSIUM: CPT | Performed by: NURSE PRACTITIONER

## 2020-06-27 PROCEDURE — 12000004 ZZH R&B IMCU UMMC

## 2020-06-27 PROCEDURE — 93005 ELECTROCARDIOGRAM TRACING: CPT

## 2020-06-27 PROCEDURE — 85610 PROTHROMBIN TIME: CPT | Performed by: STUDENT IN AN ORGANIZED HEALTH CARE EDUCATION/TRAINING PROGRAM

## 2020-06-27 PROCEDURE — 36415 COLL VENOUS BLD VENIPUNCTURE: CPT | Performed by: STUDENT IN AN ORGANIZED HEALTH CARE EDUCATION/TRAINING PROGRAM

## 2020-06-27 PROCEDURE — P9047 ALBUMIN (HUMAN), 25%, 50ML: HCPCS | Performed by: STUDENT IN AN ORGANIZED HEALTH CARE EDUCATION/TRAINING PROGRAM

## 2020-06-27 PROCEDURE — 25000128 H RX IP 250 OP 636: Performed by: STUDENT IN AN ORGANIZED HEALTH CARE EDUCATION/TRAINING PROGRAM

## 2020-06-27 PROCEDURE — 80053 COMPREHEN METABOLIC PANEL: CPT | Performed by: STUDENT IN AN ORGANIZED HEALTH CARE EDUCATION/TRAINING PROGRAM

## 2020-06-27 PROCEDURE — 25800030 ZZH RX IP 258 OP 636: Performed by: STUDENT IN AN ORGANIZED HEALTH CARE EDUCATION/TRAINING PROGRAM

## 2020-06-27 RX ORDER — AMOXICILLIN 500 MG/1
1000 CAPSULE ORAL 2 TIMES DAILY
Status: COMPLETED | OUTPATIENT
Start: 2020-06-27 | End: 2020-06-29

## 2020-06-27 RX ORDER — CLARITHROMYCIN 500 MG
500 TABLET ORAL 2 TIMES DAILY
Status: COMPLETED | OUTPATIENT
Start: 2020-06-27 | End: 2020-06-29

## 2020-06-27 RX ADMIN — AMOXICILLIN 1000 MG: 500 CAPSULE ORAL at 19:06

## 2020-06-27 RX ADMIN — OXYCODONE HYDROCHLORIDE 10 MG: 5 TABLET ORAL at 14:47

## 2020-06-27 RX ADMIN — POTASSIUM CHLORIDE 40 MEQ: 750 TABLET, EXTENDED RELEASE ORAL at 07:56

## 2020-06-27 RX ADMIN — PANTOPRAZOLE SODIUM 40 MG: 40 TABLET, DELAYED RELEASE ORAL at 07:50

## 2020-06-27 RX ADMIN — AMOXICILLIN 500 MG: 500 CAPSULE ORAL at 07:50

## 2020-06-27 RX ADMIN — OXYCODONE HYDROCHLORIDE 10 MG: 5 TABLET ORAL at 09:54

## 2020-06-27 RX ADMIN — OXYCODONE HYDROCHLORIDE 10 MG: 5 TABLET ORAL at 19:06

## 2020-06-27 RX ADMIN — OXYCODONE HYDROCHLORIDE 10 MG: 5 TABLET ORAL at 04:51

## 2020-06-27 RX ADMIN — PHYTONADIONE 10 MG: 10 INJECTION, EMULSION INTRAMUSCULAR; INTRAVENOUS; SUBCUTANEOUS at 15:59

## 2020-06-27 RX ADMIN — ALBUMIN HUMAN 100 G: 0.25 SOLUTION INTRAVENOUS at 16:39

## 2020-06-27 RX ADMIN — CLARITHROMYCIN 250 MG: 250 TABLET ORAL at 07:50

## 2020-06-27 RX ADMIN — CLARITHROMYCIN 500 MG: 500 TABLET, FILM COATED ORAL at 19:06

## 2020-06-27 RX ADMIN — POTASSIUM CHLORIDE 20 MEQ: 750 TABLET, EXTENDED RELEASE ORAL at 09:54

## 2020-06-27 RX ADMIN — POTASSIUM CHLORIDE 40 MEQ: 750 TABLET, EXTENDED RELEASE ORAL at 14:47

## 2020-06-27 ASSESSMENT — PAIN DESCRIPTION - DESCRIPTORS
DESCRIPTORS: ACHING
DESCRIPTORS: ACHING;CONSTANT

## 2020-06-27 ASSESSMENT — ACTIVITIES OF DAILY LIVING (ADL)
ADLS_ACUITY_SCORE: 10

## 2020-06-27 ASSESSMENT — MIFFLIN-ST. JEOR: SCORE: 1969.13

## 2020-06-27 NOTE — PROGRESS NOTES
"Nephrology  06/27/20     Ian Crowley MRN:[id number] YOB: 1985  Date of Admission:6/25/2020  Primary team: Medicine [place] [facility]    ASSESSMENT AND RECOMMENDATIONS:   GERTRUDIS-Baseline Cr of 0.75 on 6/11 which was the first check historically, normal imaging by US during this admission.  Cr peaked at 5.1 on admission and improving with supportive cares  - continue supportive cares    Hyponatremia - likely due to non-osmotic ADH release secondary to decrease effective arterial volume in setting of cirrhosis as well as GERTRUDIS - advise on 1.5 liter fluid restriction    Hypokalemia - agree with 100 meq potassium today    BMD-Ca 8.1, Mg and phos mildly up yesterday.     Anemia-Hgb 7.9, slightly worse, unclear chronology, acute management per team.      Discussed with Dr. Benoit Morocho MD  [facility]  Department of Medicine  Division of Renal Disease and Hypertension  [phone]     Interval events:  abd pain improving.  No fever or chills, no dyspnea at rest or with exertion.  Not thirsty but reports being told to push fluids.     No chest pain.   Had some light headedness with ambulating yesterday, very little light headed today when going from lying to sitting.    REVIEW OF SYSTEMS:  A 4 point review of systems was negative except as noted above.    Current Meds    albumin human  100 g Intravenous Q24H     amoxicillin  1,000 mg Oral BID     clarithromycin  500 mg Oral BID     pantoprazole  40 mg Oral Daily     phytonadione  10 mg Intravenous Daily at 4 pm     sodium chloride (PF)  3 mL Intracatheter Q8H       PHYSICAL EXAM:      /74 (BP Location: Left arm)   Pulse 83   Temp 97.2  F (36.2  C) (Axillary)   Resp 18   Ht 1.803 m (5' 11\")   Wt 99.7 kg (219 lb 12.8 oz)   SpO2 97%   BMI 30.66 kg/m       GENERAL APPEARANCE: alert and no distress  EYES: + scleral icterus  NECK:  No JVD  Pulmonary: normal work of breathing, no clubbing or cyanosis  CV: no " edema  SKIN: no rash, warm, dry  NEURO: mentation intact and speech normal    LABS:   CMP  Recent Labs   Lab 06/27/20  1340 06/27/20  0439 06/26/20  0455 06/25/20  2353 06/25/20  1525   NA  --  129* 128*  --  128*   POTASSIUM 3.2* 3.3* 3.5 3.2* 3.0*   CHLORIDE  --  96 95  --  93*   CO2  --  24 24  --  24   ANIONGAP  --  9 10  --  11   GLC  --  116* 98  --  114*   BUN  --  48* 56*  --  56*   CR  --  3.19* 4.17*  --  4.86*   GFRESTIMATED  --  24* 18*  --  15*   GFRESTBLACK  --  28* 20*  --  17*   NAUN  --  8.3* 8.1*  --  7.7*   MAG  --   --   --   --  3.2*   PHOS  --   --   --   --  4.7*   PROTTOTAL  --  5.7* 5.5*  --  5.0*   ALBUMIN  --  3.2* 2.7*  --  1.6*  1.6*   BILITOTAL  --  23.9* 23.6*  --  23.1*   ALKPHOS  --  312* 360*  --  377*   AST  --  112* 113*  --  101*   ALT  --  25 25  --  26     CBC  Recent Labs   Lab 06/27/20 0439 06/26/20 0455 06/25/20  1525   HGB 7.9* 8.5* 9.2*   WBC 18.0* 20.3* 25.1*   RBC 2.30* 2.53* 2.68*   HCT 23.1* 25.2* 26.2*    100 98   MCH 34.3* 33.6* 34.3*   MCHC 34.2 33.7 35.1   RDW 18.1* 18.4* 17.6*    249 290     INR  Recent Labs   Lab 06/27/20 0439 06/26/20 0455 06/25/20  1525   INR 1.87* 1.84* 1.90*     ABGNo lab results found in last 7 days.   URINE STUDIES  Recent Labs   Lab Test 06/25/20 2000   COLOR Orange   APPEARANCE Slightly Cloudy   URINEGLC Negative   URINEBILI Large*   URINEKETONE Negative   SG 1.016   UBLD Trace*   URINEPH 5.5   PROTEIN 30*   NITRITE Negative   LEUKEST Small*   RBCU <1   WBCU 19*     Recent Labs   Lab Test 06/25/20 2000   UTPG 0.54*     PTH  No lab results found.  IRON STUDIES  No lab results found.

## 2020-06-27 NOTE — PLAN OF CARE
Neuro: A&Ox4.   Cardiac: SR. VSS.   Respiratory: Sating >92% on RA.  GI/: Adequate urine output. Urine icteric. No BM. Abd distention.   Diet/appetite: Tolerating 2g Na diet. Eating well.  Activity: Indep, up to chair and in halls.  Pain: At acceptable level on current regimen. 10 Mg Oxy given x2  Skin: No new deficits noted. R para site CDI   LDA's: R PIV SL     Plan: Continue with POC. Notify primary team with changes.

## 2020-06-27 NOTE — PROGRESS NOTES
"[facility]    Hepatology Follow-up    CC: Alcoholic Hepatitis    24 hour events:   No acute events overnight    Subjective:  Patient denies fevers, sweats or chills.  Denies any nausea or vomiting  Having regular bowel movements  Stated that they may have seen some blood in the stool, instructed him to see the stool for nurses to review    Medications  Current Facility-Administered Medications   Medication Dose Route Frequency     albumin human  100 g Intravenous Q24H     amoxicillin  500 mg Oral BID     clarithromycin  250 mg Oral BID     pantoprazole  40 mg Oral Daily     phytonadione  10 mg Intravenous Daily at 4 pm     sodium chloride (PF)  3 mL Intracatheter Q8H       Review of systems  A 10-point review of systems was negative.    Examination  /83 (BP Location: Left arm)   Pulse 83   Temp 98  F (36.7  C) (Oral)   Resp 18   Ht 1.803 m (5' 11\")   Wt 99.7 kg (219 lb 12.8 oz)   SpO2 99%   BMI 30.66 kg/m      Intake/Output Summary (Last 24 hours) at 6/27/2020 0744  Last data filed at 6/27/2020 0500  Gross per 24 hour   Intake 1860 ml   Output 2500 ml   Net -640 ml       Gen- well, NAD, A+Ox3, normal color  CVS- RRR  RS- CTA  Abd-soft abdomen, mildly distended, nontender  Extr-trace edema in the lower extremities  Neuro-  Skin-jaundice is present  Psych- normal mood  Lym- no LAD    Laboratory  Lab Results   Component Value Date     06/27/2020    POTASSIUM 3.3 06/27/2020    CHLORIDE 96 06/27/2020    CO2 24 06/27/2020    BUN 48 06/27/2020    CR 3.19 06/27/2020       Lab Results   Component Value Date    BILITOTAL 23.9 06/27/2020    ALT 25 06/27/2020     06/27/2020    ALKPHOS 312 06/27/2020       Lab Results   Component Value Date    WBC 18.0 06/27/2020    HGB 7.9 06/27/2020     06/27/2020     06/27/2020       Lab Results   Component Value Date    INR 1.87 06/27/2020     MELD-Na score: 38 at 6/27/2020  4:39 AM  MELD score: 37 at 6/27/2020  4:39 " AM  Calculated from:  Serum Creatinine: 3.19 mg/dL at 6/27/2020  4:39 AM  Serum Sodium: 129 mmol/L at 6/27/2020  4:39 AM  Total Bilirubin: 23.9 mg/dL at 6/27/2020  4:39 AM  INR(ratio): 1.87 at 6/27/2020  4:39 AM  Age: 34 years 11 months      Radiology  No new imaging today    Assessment  Ian Crowley is a 34 year old male with PMHx of alcohol use disorder, tobacco use, HTN (not on anti-HTN) and gout who presented with jaundice, abdominal pain and renal injury.  His liver tests are elevated but stable. Renal function with improvement from one day prior. No SBP on paracentesis based on cell counts, fluid cultures are pending.     #. Alcoholic Hepatitis  #. Conjugated hyperbilirubinemia  Patient presented to OSH with conjugated hyperbilirubinemia on 6/11 in the setting of alcohol overuse, likely secondary to alcohol related hepatitis. Abdominal US w/doppler without evidence of thrombosis - but coarse echotexture c/f fibrosis. Complications: ascites; no HE or EV/GV.  MELD-Na: 37 (6/26/20)     #. Ascites: TTE with normal EF on 6/25, see below. SAAG > 1.1 on repeat paracentesis at [facility] on 6/26, no evidence of SBP. Total protein 2.8 (peritoneal fluid).      #. Transplant  At this time, the patient is NOT a transplant candidate because he lacks insight into his alcohol use disorder and is not interested in chemical dependency treatment at this time. Discussed the severity of the patient's illness with the patient extensively today given elevated MELD and significant GERTRUDIS, patient with minimal insight into the severity of his illness.      #. Alcohol use disorder  #. Seizures, presumed 2/2 alcohol use  #. GERTRUDIS, proteinuria  #. 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    Patient discussed with Dr Arnold.    Eric LEARY MD  Gastroenterology Fellow  Division of Gastroenterology, Hepatology and Nutrition  [facility]    The patient was seen and examined.  The above assessment and plan was developed jointly with the fellow.      Moncho Arnold MD

## 2020-06-27 NOTE — PLAN OF CARE
D: Patient transferred from [facility] on 6/25 for further hepatology work-up due to increasing creatinine, total bilirubin and increasing ascites. Hx: new alcoholic hepatitis/cirrhosis, alcohol abuse, tobacco use, HTN and gout.    I: Monitored vitals and assessed patient status. Reinforced fluid restriction.  Changed: none  Running: PIV saline locked  PRN: 60 meq k+    A: VSS. A0x4. Afebrile. Sinus rhythm. Urinating adequately via urinal. Up ad [place].    P: Anticipate possible discharge home this weekend. Continue to assess and monitor, notify [facility] 4 team with concerns.

## 2020-06-27 NOTE — PROGRESS NOTES
[facility] [place]    Progress Note - Sylvie Zuleta Service        Date of Admission:  6/25/2020    Assessment & Plan   34 year old male who is transferred from [facility] to the [facility] on 6/25 for further care by Hepatology team given rising Cr, total bilirubin, and increasing ascites.      Alcoholic hepatitis  Alcoholic cirrhosis  Hyperbilirubinemia  Alcohol use disorder  Patient with first presentation due to liver concerns on 6/11 with work up consistent with alcoholic hepatitis. RUQ with dopplers without PVT, but concerning for fibrosis. EGD did not show EV, but he had portal hypertensive gastropathy and H. pylori. He had paracentesis performed and was discharged on H. pylori triple therapy.   Presented again to [facility] on 6/24 with increased abdominal distension and pain. Found to have increased Cr to 5.1 (BL 0.8), increased bilirubin.   RUQ on 6/25 showed cirrhotic morphology of liver, bidirectional flow in the main and right portal veins.   TTE with EF 60-65%.  SAAG of 1.3 on 6/26 - ascites due to portal hypertension  No SBP - ascites neutrophils of 48    MELD-Na score: 38 at 6/27/2020  4:39 AM  MELD score: 37 at 6/27/2020  4:39 AM  Calculated from:  Serum Creatinine: 3.19 mg/dL at 6/27/2020  4:39 AM  Serum Sodium: 129 mmol/L at 6/27/2020  4:39 AM  Total Bilirubin: 23.9 mg/dL at 6/27/2020  4:39 AM  INR(ratio): 1.87 at 6/27/2020  4:39 AM  Age: 34 years 11 months    Transplant SW and Psychiatry have seen patient and he reports he is not willing to go through a chem dep rehab program, but will quit on his own.   Patient with poor insight into his alcohol use disorder.  - Hepatology consulted, appreciate recs  - vitamin K 10 mg IV x 3 days (6/25 - 6/27)   - INR has remained stead at 1.8-1.9, less likely nutritional  - daily CMP, CBC and INR  - immunization with Hep A/B upon discharge     GERTRUDIS  Hyponatremia  Patient with reported decreased urine output since discharge from  hospital on 6/15. Denies hematuria, dysuria. Given GERTRUDIS in setting of alcoholic hepatitis and likely cirrhosis, initially there was concern for hepatorenal syndrome, however, patient has responded well to albumin challenge.   Cr of 3.2 on 6//27  Sodium has been stable, due to underlying cirrhosis.  - Hepatology consulted, appreciate recs  - Nephrology consulted, appreciate recs  - albumin challenge with albumin 100 g daily x 3 days (6/25-6/27)  - strict I/Os  - daily weights     Hypokalemia  Likely due to underlying liver disease.   - potassium replacement protocol     H. pylori gastritis  - continue H. pylori treatment (PPI, amoxicillin, clarithromycin)     Severe malnutrition   In the setting of underlying liver disease and poor oral nutrition.   Alb 1.6  - continue to monitor  - 2 gram sodium diet      Diet: 2 gram Na diet  Fluids: None  DVT Prophylaxis: Pneumatic Compression Devices  Constantino Catheter: not present  Code Status: Full code           Disposition Plan   Expected discharge: 1-2 days, recommended to prior living arrangement- pending improvement and stability of kidney function.  Entered: Derek Laboy MD 06/27/2020, 7:15 AM       The patient's care was discussed with the Attending Physician, Dr. Ramon.    Derek Laboy MD  [address], [place]  Pager: [phone]  Please see sticky note for cross cover information  ______________________________________________________________________    Interval History   NAEO. Patient did not sleep well last night, but says he feels a lot better now compared to when he first got here. Abdominal pain well controlled with current regimen. Denies SOB, chest pain. Good urine output.     4-point ROS is otherwise unremarkable.    Data reviewed today: I reviewed all medications, new labs and imaging results over the last 24 hours.    Physical Exam   Vital Signs: Temp: 98  F (36.7  C) Temp src: Oral BP: 129/83 Pulse: 83 Heart Rate:  96 Resp: 18 SpO2: 99 % O2 Device: None (Room air)    Weight: 219 lbs 12.78 oz  Gen: sleeping in bed upon first entering room, easily awakened  HEENT: icteric sclera  CV: RRR, nl S1/S2  Pulm: non-labored breathing on RA, CTAB  Abd: distended, taut, non-tender  Ext: trace bliateral LEAH    Data   Recent Labs   Lab 06/27/20  0439 06/26/20  0455 06/25/20  2353 06/25/20  1525   WBC 18.0* 20.3*  --  25.1*   HGB 7.9* 8.5*  --  9.2*    100  --  98    249  --  290   INR 1.87* 1.84*  --  1.90*   * 128*  --  128*   POTASSIUM 3.3* 3.5 3.2* 3.0*   CHLORIDE 96 95  --  93*   CO2 24 24  --  24   BUN 48* 56*  --  56*   CR 3.19* 4.17*  --  4.86*   ANIONGAP 9 10  --  11   NAUN 8.3* 8.1*  --  7.7*   * 98  --  114*   ALBUMIN 3.2* 2.7*  --  1.6*  1.6*   PROTTOTAL 5.7* 5.5*  --  5.0*   BILITOTAL 23.9* 23.6*  --  23.1*   ALKPHOS 312* 360*  --  377*   ALT 25 25  --  26   * 113*  --  101*     Recent Results (from the past 24 hour(s))   IR Paracentesis    Narrative    Procedure date: 6/26/2020:    1. Ultrasound guided diagnostic and therapeutic paracentesis.    Preop diagnosis: Ascites.    Postop diagnosis: Same.    Proceduralist: Luis Cervantes PA-C    Assist: None.    Medications: No intravenous sedation was administered. 1% lidocaine,  10 cc.     Nursing: The patient was placed on continuous monitoring by IR nursing  staff under my supervision. The patient remained stable throughout the  procedure.    PROCEDURE: The patient understood the limitations, alternatives, and  risks of the procedure and requested the procedure be performed. Both  written and oral consent were obtained.    The patient was identified by name and date of birth, and placed in  the recumbent position. The right lower quadrant was prepped and  draped in the usual sterile fashion. Ultrasound evaluation revealed a  small to moderate fluid collection at this location, and an image was  saved. The site overlying the fluid was anesthetized  with 1%  lidocaine. Under ultrasound guidance, a 5 Persian, 10 cm straight Yueh  catheter was advanced into the abdominal space, and an image was  saved. Syringe aspiration revealed initial return of clear yellow  fluid. 1200 cc ascites aspirated and sent for labs as ordered.  Catheter to vacuum drainage, with 1280 cc ascites aspirated. Catheter  removed. Sterile dressing applied. Images were saved throughout the  procedure. Procedure was well tolerated, with no immediate  complications.    Estimate blood loss: Less than 1 cc.    Specimens: 120 cc clear yellow ascites.      Impression    IMPRESSION: Ultrasound guided diagnostic and therapeutic paracentesis.  Total of  1400 cc of clear yellow ascites aspirated.    PLAN: Followup per primary team.    Attestation: The physician assistant (PA) who performed this procedure  and signed the above report is licensed to practice in the [facility] pursuant to [place] Statute 147A.09.  This includes meeting the  Statute and [place] Board of Medical Practice requirement of an  active Delegation Agreement, which documents delegation of services by  primary and alternate supervising physicians. All services rendered  are performed under a collaborative agreement with Dr. Estrada Kuhn, Director of Interventional Radiology, [facility].    SHIRA SHERWOOD PA-C     Medications       albumin human  100 g Intravenous Q24H     amoxicillin  500 mg Oral BID     clarithromycin  250 mg Oral BID     pantoprazole  40 mg Oral Daily     phytonadione  10 mg Intravenous Daily at 4 pm     sodium chloride (PF)  3 mL Intracatheter Q8H

## 2020-06-28 LAB
ALBUMIN SERPL-MCNC: 3.8 G/DL (ref 3.4–5)
ALP SERPL-CCNC: 334 U/L (ref 40–150)
ALT SERPL W P-5'-P-CCNC: 27 U/L (ref 0–70)
ANION GAP SERPL CALCULATED.3IONS-SCNC: 11 MMOL/L (ref 3–14)
AST SERPL W P-5'-P-CCNC: 126 U/L (ref 0–45)
BILIRUB SERPL-MCNC: 24.9 MG/DL (ref 0.2–1.3)
BUN SERPL-MCNC: 42 MG/DL (ref 7–30)
CALCIUM SERPL-MCNC: 8.8 MG/DL (ref 8.5–10.1)
CHLORIDE SERPL-SCNC: 98 MMOL/L (ref 94–109)
CO2 SERPL-SCNC: 20 MMOL/L (ref 20–32)
CREAT SERPL-MCNC: 2.21 MG/DL (ref 0.66–1.25)
ERYTHROCYTE [DISTWIDTH] IN BLOOD BY AUTOMATED COUNT: 18.1 % (ref 10–15)
GFR SERPL CREATININE-BSD FRML MDRD: 38 ML/MIN/{1.73_M2}
GLUCOSE SERPL-MCNC: 124 MG/DL (ref 70–99)
HCT VFR BLD AUTO: 25.5 % (ref 40–53)
HGB BLD-MCNC: 8.5 G/DL (ref 13.3–17.7)
INR PPP: 1.85 (ref 0.86–1.14)
LACTATE BLD-SCNC: 1.6 MMOL/L (ref 0.7–2)
MCH RBC QN AUTO: 34 PG (ref 26.5–33)
MCHC RBC AUTO-ENTMCNC: 33.3 G/DL (ref 31.5–36.5)
MCV RBC AUTO: 102 FL (ref 78–100)
PLATELET # BLD AUTO: 220 10E9/L (ref 150–450)
POTASSIUM SERPL-SCNC: 3.7 MMOL/L (ref 3.4–5.3)
PROT SERPL-MCNC: 6.3 G/DL (ref 6.8–8.8)
RBC # BLD AUTO: 2.5 10E12/L (ref 4.4–5.9)
SODIUM SERPL-SCNC: 129 MMOL/L (ref 133–144)
WBC # BLD AUTO: 19.4 10E9/L (ref 4–11)

## 2020-06-28 PROCEDURE — 85027 COMPLETE CBC AUTOMATED: CPT | Performed by: STUDENT IN AN ORGANIZED HEALTH CARE EDUCATION/TRAINING PROGRAM

## 2020-06-28 PROCEDURE — 25000132 ZZH RX MED GY IP 250 OP 250 PS 637: Performed by: STUDENT IN AN ORGANIZED HEALTH CARE EDUCATION/TRAINING PROGRAM

## 2020-06-28 PROCEDURE — 12000001 ZZH R&B MED SURG/OB UMMC

## 2020-06-28 PROCEDURE — 36415 COLL VENOUS BLD VENIPUNCTURE: CPT | Performed by: STUDENT IN AN ORGANIZED HEALTH CARE EDUCATION/TRAINING PROGRAM

## 2020-06-28 PROCEDURE — 85610 PROTHROMBIN TIME: CPT | Performed by: STUDENT IN AN ORGANIZED HEALTH CARE EDUCATION/TRAINING PROGRAM

## 2020-06-28 PROCEDURE — 36415 COLL VENOUS BLD VENIPUNCTURE: CPT | Performed by: INTERNAL MEDICINE

## 2020-06-28 PROCEDURE — 83605 ASSAY OF LACTIC ACID: CPT | Performed by: INTERNAL MEDICINE

## 2020-06-28 PROCEDURE — 25000132 ZZH RX MED GY IP 250 OP 250 PS 637: Performed by: INTERNAL MEDICINE

## 2020-06-28 PROCEDURE — 80053 COMPREHEN METABOLIC PANEL: CPT | Performed by: STUDENT IN AN ORGANIZED HEALTH CARE EDUCATION/TRAINING PROGRAM

## 2020-06-28 RX ADMIN — OXYCODONE HYDROCHLORIDE 10 MG: 5 TABLET ORAL at 08:23

## 2020-06-28 RX ADMIN — OXYCODONE HYDROCHLORIDE 10 MG: 5 TABLET ORAL at 04:15

## 2020-06-28 RX ADMIN — OXYCODONE HYDROCHLORIDE 10 MG: 5 TABLET ORAL at 20:45

## 2020-06-28 RX ADMIN — OXYCODONE HYDROCHLORIDE 10 MG: 5 TABLET ORAL at 12:23

## 2020-06-28 RX ADMIN — OXYCODONE HYDROCHLORIDE 10 MG: 5 TABLET ORAL at 00:31

## 2020-06-28 RX ADMIN — CLARITHROMYCIN 500 MG: 500 TABLET, FILM COATED ORAL at 08:17

## 2020-06-28 RX ADMIN — AMOXICILLIN 1000 MG: 500 CAPSULE ORAL at 08:17

## 2020-06-28 RX ADMIN — PANTOPRAZOLE SODIUM 40 MG: 40 TABLET, DELAYED RELEASE ORAL at 08:17

## 2020-06-28 RX ADMIN — AMOXICILLIN 1000 MG: 500 CAPSULE ORAL at 20:45

## 2020-06-28 RX ADMIN — OXYCODONE HYDROCHLORIDE 10 MG: 5 TABLET ORAL at 16:37

## 2020-06-28 RX ADMIN — CLARITHROMYCIN 500 MG: 500 TABLET, FILM COATED ORAL at 20:45

## 2020-06-28 ASSESSMENT — ACTIVITIES OF DAILY LIVING (ADL)
ADLS_ACUITY_SCORE: 10

## 2020-06-28 ASSESSMENT — PAIN DESCRIPTION - DESCRIPTORS
DESCRIPTORS: ACHING

## 2020-06-28 ASSESSMENT — MIFFLIN-ST. JEOR: SCORE: 1975.94

## 2020-06-28 NOTE — PLAN OF CARE
Admitted/transferred from:   2 RN full   skin assessment completed by Kitty Mack, RN and Shannon George RN.  Skin assessment finding: issues found jaundice skin    Interventions/actions: skin interventions (monitor labs etc, provide lotion for any itchiness      Will continue to monitor.

## 2020-06-28 NOTE — PLAN OF CARE
Abdominal pain is being managed with oxycodone. Patient is up al [place], good appetite. Continues on po antibiotics. GI following. Monitoring labs.

## 2020-06-28 NOTE — PROGRESS NOTES
[id number]:  Pt transferred to  by wheelchair and resource float nurse. Report was given. Belongings sent with pt.     Pt is A&Ox4. Afebrile. VSS on RA. No tele orders. R PIV SL. C/o abdominal discomfort, improved with PRN Oxycodone x1. Triggered sepsis protocol- Lactic acid: 1.6. Urinal voiding with good output. No BM this shift. Up SBA in room. Continue to monitor and follow POC.

## 2020-06-28 NOTE — PROGRESS NOTES
Brief GI Note    Reviewed labs, his renal function continues to improve.  Hgb is improving. Liver labs are stable.    - continue to monitor  - continue H pylori treatment    Please call hepatology with any clinical changes.    Eric LEARY MD  Gastroenterology Fellow  Division of Gastroenterology, Hepatology and Nutrition  [facility]

## 2020-06-28 NOTE — PROGRESS NOTES
"Nephrology  06/27/20     Ian Crowley MRN:[id number] YOB: 1985  Date of Admission:6/25/2020  Primary team: [facility] 4    ASSESSMENT AND RECOMMENDATIONS:   GERTRUDIS-Baseline Cr of 0.75 on 6/11 which was the first check historically, normal imaging by US during this admission.  Cr peaked at 5.1 on admission and improving with supportive cares  - continue supportive cares    Hyponatremia - likely due to non-osmotic ADH release secondary to decrease effective arterial volume in setting of cirrhosis as well as GERTRUDIS - continue on 1.5 liter fluid restriction    Hypokalemia - improved with 100 meq potassium yesterday, recommend another 20 meq which can be given orally as he had burning pain with IV potassium    Veronica Morocho MD  [facility]  Department of Medicine  Division of Renal Disease and Hypertension  [phone]     Interval events:  Abdomen still sore but tolerable and not worsening.  No dyspnea, no leg edema, no nausea or vomiting. No fever or chills.  Had burning in arm with IV potassium yesterday.  Requests oral for future doses.    REVIEW OF SYSTEMS:  A 4 point review of systems was negative except as noted above.    Current Meds    amoxicillin  1,000 mg Oral BID     clarithromycin  500 mg Oral BID     pantoprazole  40 mg Oral Daily     sodium chloride (PF)  3 mL Intracatheter Q8H       PHYSICAL EXAM:      /77 (BP Location: Left arm)   Pulse 100   Temp 96  F (35.6  C) (Oral)   Resp 14   Ht 1.803 m (5' 11\")   Wt 100.4 kg (221 lb 4.8 oz)   SpO2 99%   BMI 30.87 kg/m       GENERAL APPEARANCE: alert and no distress  EYES: + scleral icterus  NECK:  No JVD  Pulmonary: normal work of breathing, no clubbing or cyanosis  CV: no edema  SKIN: no rash, warm, dry  NEURO: mentation intact and speech normal    LABS:   CMP  Recent Labs   Lab 06/28/20  0753 06/27/20  1340 06/27/20  0439 06/26/20  0455  06/25/20  1525   *  --  129* 128*  --  128* "   POTASSIUM 3.7 3.2* 3.3* 3.5   < > 3.0*   CHLORIDE 98  --  96 95  --  93*   CO2 20  --  24 24  --  24   ANIONGAP 11  --  9 10  --  11   *  --  116* 98  --  114*   BUN 42*  --  48* 56*  --  56*   CR 2.21*  --  3.19* 4.17*  --  4.86*   GFRESTIMATED 38*  --  24* 18*  --  15*   GFRESTBLACK 44*  --  28* 20*  --  17*   NAUN 8.8  --  8.3* 8.1*  --  7.7*   MAG  --   --   --   --   --  3.2*   PHOS  --   --   --   --   --  4.7*   PROTTOTAL 6.3*  --  5.7* 5.5*  --  5.0*   ALBUMIN 3.8  --  3.2* 2.7*  --  1.6*  1.6*   BILITOTAL 24.9*  --  23.9* 23.6*  --  23.1*   ALKPHOS 334*  --  312* 360*  --  377*   *  --  112* 113*  --  101*   ALT 27  --  25 25  --  26    < > = values in this interval not displayed.     CBC  Recent Labs   Lab 06/28/20 0753 06/27/20 0439 06/26/20 0455 06/25/20  1525   HGB 8.5* 7.9* 8.5* 9.2*   WBC 19.4* 18.0* 20.3* 25.1*   RBC 2.50* 2.30* 2.53* 2.68*   HCT 25.5* 23.1* 25.2* 26.2*   * 100 100 98   MCH 34.0* 34.3* 33.6* 34.3*   MCHC 33.3 34.2 33.7 35.1   RDW 18.1* 18.1* 18.4* 17.6*    199 249 290     INR  Recent Labs   Lab 06/28/20 0753 06/27/20 0439 06/26/20 0455 06/25/20  1525   INR 1.85* 1.87* 1.84* 1.90*     ABGNo lab results found in last 7 days.   URINE STUDIES  Recent Labs   Lab Test 06/25/20 2000   COLOR Orange   APPEARANCE Slightly Cloudy   URINEGLC Negative   URINEBILI Large*   URINEKETONE Negative   SG 1.016   UBLD Trace*   URINEPH 5.5   PROTEIN 30*   NITRITE Negative   LEUKEST Small*   RBCU <1   WBCU 19*     Recent Labs   Lab Test 06/25/20 2000   UTPG 0.54*     PTH  No lab results found.  IRON STUDIES  No lab results found.

## 2020-06-28 NOTE — PLAN OF CARE
Pt alert and oriented x4. VSS. Pt reports abdominal pain, PRN oxy given 1x with relief. Appetite fair, denies nausea. Per pt report, does not like hospital selection of foods. States feeling too restricted with the food selections and has not had a good meal here. Pt requested to be allowed to have family bring pt food for dinner. Writer RN educated pt on the importance of nutrition and being on a low sodium diet. Pt verbalizes understanding and states that he will tell his family to choose a selection along these guidelines. Per pt report, last BM today; BM remains bloody. Pt voiding. Up independently in room.     PLAN: Pt to discharge when medically stable. Continue to monitor and assess pt with every encounter. Notify Med [facility] 4 with any changes or concerns.

## 2020-06-28 NOTE — PLAN OF CARE
Transferred from  around 0400.    A x O, VSS on RA. Abdominal pain managed w/ oxycodone. Skin is very jaundice. Denies n/v. Tolerating 2g Na. Voiding spontaneously, urinal at bedside. SBA. Resting between care. Continue POC.

## 2020-06-28 NOTE — PLAN OF CARE
"/78 (BP Location: Left arm)   Pulse 83   Temp 97.8  F (36.6  C) (Oral)   Resp 20   Ht 1.803 m (5' 11\")   Wt 99.7 kg (219 lb 12.8 oz)   SpO2 100%   BMI 30.66 kg/m    Neuro: A&Ox4.   Cardiac: No tele orders. VSS. Afebrile.   Respiratory: Sating 100% on RA.  GI/: Adequate urine output. No BM this shift.  Diet/appetite: Tolerating 2g Na, 1.5L FR diet. Eating well.  Activity:  Assist of SB, up to chair and in halls.  Pain: At acceptable level on current regimen. PRN oxy given x1  Skin: No new deficits noted. PRN oxy given x1.  LDA's:  R PIV SL.    Plan: Transfer to med/surg when bed available. Continue with POC. Notify primary team with changes.    "

## 2020-06-29 LAB
ALBUMIN SERPL-MCNC: 3.3 G/DL (ref 3.4–5)
ALP SERPL-CCNC: 305 U/L (ref 40–150)
ALT SERPL W P-5'-P-CCNC: 27 U/L (ref 0–70)
ANION GAP SERPL CALCULATED.3IONS-SCNC: 9 MMOL/L (ref 3–14)
AST SERPL W P-5'-P-CCNC: 116 U/L (ref 0–45)
BILIRUB SERPL-MCNC: 23.2 MG/DL (ref 0.2–1.3)
BUN SERPL-MCNC: 38 MG/DL (ref 7–30)
CALCIUM SERPL-MCNC: 8.9 MG/DL (ref 8.5–10.1)
CHLORIDE SERPL-SCNC: 100 MMOL/L (ref 94–109)
CO2 SERPL-SCNC: 22 MMOL/L (ref 20–32)
COPATH REPORT: NORMAL
CREAT SERPL-MCNC: 1.96 MG/DL (ref 0.66–1.25)
ERYTHROCYTE [DISTWIDTH] IN BLOOD BY AUTOMATED COUNT: 18.2 % (ref 10–15)
GFR SERPL CREATININE-BSD FRML MDRD: 44 ML/MIN/{1.73_M2}
GLUCOSE SERPL-MCNC: 100 MG/DL (ref 70–99)
HCT VFR BLD AUTO: 26.2 % (ref 40–53)
HGB BLD-MCNC: 8.6 G/DL (ref 13.3–17.7)
INTERPRETATION ECG - MUSE: NORMAL
LACTATE BLD-SCNC: 1.9 MMOL/L (ref 0.7–2)
MCH RBC QN AUTO: 33.7 PG (ref 26.5–33)
MCHC RBC AUTO-ENTMCNC: 32.8 G/DL (ref 31.5–36.5)
MCV RBC AUTO: 103 FL (ref 78–100)
OSMOLALITY UR: 408 MMOL/KG (ref 100–1200)
PLATELET # BLD AUTO: 221 10E9/L (ref 150–450)
POTASSIUM SERPL-SCNC: 3.8 MMOL/L (ref 3.4–5.3)
POTASSIUM UR-SCNC: 30 MMOL/L
PROT SERPL-MCNC: 5.9 G/DL (ref 6.8–8.8)
RBC # BLD AUTO: 2.55 10E12/L (ref 4.4–5.9)
SODIUM SERPL-SCNC: 131 MMOL/L (ref 133–144)
SODIUM UR-SCNC: 9 MMOL/L
WBC # BLD AUTO: 18.3 10E9/L (ref 4–11)

## 2020-06-29 PROCEDURE — 36415 COLL VENOUS BLD VENIPUNCTURE: CPT | Performed by: INTERNAL MEDICINE

## 2020-06-29 PROCEDURE — 84133 ASSAY OF URINE POTASSIUM: CPT | Performed by: INTERNAL MEDICINE

## 2020-06-29 PROCEDURE — 80053 COMPREHEN METABOLIC PANEL: CPT | Performed by: STUDENT IN AN ORGANIZED HEALTH CARE EDUCATION/TRAINING PROGRAM

## 2020-06-29 PROCEDURE — 83605 ASSAY OF LACTIC ACID: CPT | Performed by: INTERNAL MEDICINE

## 2020-06-29 PROCEDURE — 12000001 ZZH R&B MED SURG/OB UMMC

## 2020-06-29 PROCEDURE — 25000132 ZZH RX MED GY IP 250 OP 250 PS 637: Performed by: INTERNAL MEDICINE

## 2020-06-29 PROCEDURE — 25000132 ZZH RX MED GY IP 250 OP 250 PS 637: Performed by: STUDENT IN AN ORGANIZED HEALTH CARE EDUCATION/TRAINING PROGRAM

## 2020-06-29 PROCEDURE — 36415 COLL VENOUS BLD VENIPUNCTURE: CPT | Performed by: STUDENT IN AN ORGANIZED HEALTH CARE EDUCATION/TRAINING PROGRAM

## 2020-06-29 PROCEDURE — 83935 ASSAY OF URINE OSMOLALITY: CPT | Performed by: INTERNAL MEDICINE

## 2020-06-29 PROCEDURE — 84300 ASSAY OF URINE SODIUM: CPT | Performed by: INTERNAL MEDICINE

## 2020-06-29 PROCEDURE — 85027 COMPLETE CBC AUTOMATED: CPT | Performed by: STUDENT IN AN ORGANIZED HEALTH CARE EDUCATION/TRAINING PROGRAM

## 2020-06-29 RX ORDER — GABAPENTIN 300 MG/1
300 CAPSULE ORAL AT BEDTIME
Status: DISCONTINUED | OUTPATIENT
Start: 2020-06-29 | End: 2020-06-30 | Stop reason: HOSPADM

## 2020-06-29 RX ADMIN — OXYCODONE HYDROCHLORIDE 10 MG: 5 TABLET ORAL at 13:34

## 2020-06-29 RX ADMIN — AMOXICILLIN 1000 MG: 500 CAPSULE ORAL at 20:13

## 2020-06-29 RX ADMIN — OXYCODONE HYDROCHLORIDE 10 MG: 5 TABLET ORAL at 04:40

## 2020-06-29 RX ADMIN — ACETAMINOPHEN 500 MG: 500 TABLET, FILM COATED ORAL at 20:33

## 2020-06-29 RX ADMIN — OXYCODONE HYDROCHLORIDE 10 MG: 5 TABLET ORAL at 22:52

## 2020-06-29 RX ADMIN — OXYCODONE HYDROCHLORIDE 10 MG: 5 TABLET ORAL at 00:35

## 2020-06-29 RX ADMIN — CLARITHROMYCIN 500 MG: 500 TABLET, FILM COATED ORAL at 07:45

## 2020-06-29 RX ADMIN — PANTOPRAZOLE SODIUM 40 MG: 40 TABLET, DELAYED RELEASE ORAL at 07:45

## 2020-06-29 RX ADMIN — OXYCODONE HYDROCHLORIDE 10 MG: 5 TABLET ORAL at 18:49

## 2020-06-29 RX ADMIN — CLARITHROMYCIN 500 MG: 500 TABLET, FILM COATED ORAL at 20:13

## 2020-06-29 RX ADMIN — AMOXICILLIN 1000 MG: 500 CAPSULE ORAL at 07:45

## 2020-06-29 RX ADMIN — GABAPENTIN 300 MG: 300 CAPSULE ORAL at 20:13

## 2020-06-29 RX ADMIN — OXYCODONE HYDROCHLORIDE 10 MG: 5 TABLET ORAL at 09:07

## 2020-06-29 ASSESSMENT — PAIN DESCRIPTION - DESCRIPTORS
DESCRIPTORS: ACHING
DESCRIPTORS: ACHING;PRESSURE
DESCRIPTORS: ACHING;DISCOMFORT
DESCRIPTORS: ACHING
DESCRIPTORS: ACHING;PRESSURE
DESCRIPTORS: ACHING;DISCOMFORT

## 2020-06-29 ASSESSMENT — ACTIVITIES OF DAILY LIVING (ADL)
ADLS_ACUITY_SCORE: 10

## 2020-06-29 ASSESSMENT — MIFFLIN-ST. JEOR: SCORE: 1971.86

## 2020-06-29 NOTE — PROGRESS NOTES
Hepatology Follow up Note           Assessment and Plan:   Ian Crowley is a 34 year old male with PMHx of alcohol use disorder, tobacco use, HTN (not on anti-HTN) and gout who presented with jaundice, abdominal pain and renal injury.     #. Alcoholic Hepatitis  #. Conjugated hyperbilirubinemia  Abdominal US w/doppler without evidence of thrombosis - but coarse echotexture c/f fibrosis. Complications: ascites; no HE or EV/GV.  MELD-Na: 34 (6/29/2020)  Ascites:  SAAG > 1.1 on repeat paracentesis at [facility] on 6/26, no evidence of SBP. Total protein 2.8 (peritoneal fluid).    Transplant: NOT a transplant candidate due to lack of insight into AUD    #. Alcohol use disorder  #. Seizures, presumed 2/2 alcohol use  #. GERTRUDIS, proteinuria  #. Coagulopathy: s/p vitamin K (6/25-6/27)    SCr improving with fluids and albumin. LFTs stable. WBC stable         Recommendations:   -- Consider removing fluid restriction, follow up urine studies: Dillon, U potassium and U osm to assess utility   -- Given abdominal discomfort w/distension and mild SOB, consider paracentesis (remove less than 2L given GERTRUDIS)   * If paracentesis done, please send: cell count w/differential, gram stain and cultures  -- Continue H. Pylori treatment  -- Recommend gabapentin 300mg daily for AUD (to decrease cravings), increase dose based on response and tolerability in increments of 300 mg every 2 days up to 300 mg 3 times daily   -- Follow up with chemical dependency in the outpatient setting    Health Maintenance:  -- Hepatitis A and B non-immune, recommend initiation of vaccine initiation upon discharge    Follow up:  -- Patient lives in [place] and would like to establish care with hepatology at [facility] upon discharge, plan for follow up with Dr. Mota (with Dr. Leventhal)    Patient discussed with Hepatology staff, Dr. Arnold.  Please do not hesitate to contact the Hepatology service with any questions or concerns.     Lanier (Lizzie)  "Svetlana  Gastroenterology/Hepatology Fellow  Pager [phone]    The patient was seen and examined.  The above assessment and plan was developed jointly with the fellow.      Moncho Arnold MD         Subjective/Objective:   - No acute events overnight  - Mentions abdominal distension and associated discomfort  - Mild SOB, denies cough or fevers  - Denies melena or hematochezai         Medications:     Current Facility-Administered Medications   Medication     acetaminophen (TYLENOL) tablet 500 mg     amoxicillin (AMOXIL) capsule 1,000 mg     clarithromycin (BIAXIN) tablet 500 mg     glucose gel 15-30 g    Or     dextrose 50 % injection 25-50 mL    Or     glucagon injection 1 mg     lidocaine (LMX4) cream     lidocaine 1 % 0.1-1 mL     melatonin tablet 1 mg     naloxone (NARCAN) injection 0.1-0.4 mg     ondansetron (ZOFRAN-ODT) ODT tab 4 mg    Or     ondansetron (ZOFRAN) injection 4 mg     oxyCODONE (ROXICODONE) tablet 5-10 mg     pantoprazole (PROTONIX) EC tablet 40 mg     polyethylene glycol (MIRALAX) Packet 17 g     potassium chloride (KLOR-CON) Packet 20-40 mEq     potassium chloride 10 mEq in 100 mL intermittent infusion with 10 mg lidocaine     potassium chloride 10 mEq in 100 mL sterile water intermittent infusion (premix)     potassium chloride 20 mEq in 50 mL intermittent infusion     potassium chloride ER (KLOR-CON M) CR tablet 20-40 mEq     sodium chloride (PF) 0.9% PF flush 3 mL     sodium chloride (PF) 0.9% PF flush 3 mL            Physical Exam:   VS:  /65 (BP Location: Left arm)   Pulse 89   Temp 97.6  F (36.4  C) (Oral)   Resp 16   Ht 1.803 m (5' 11\")   Wt 100.4 kg (221 lb 4.8 oz)   SpO2 98%   BMI 30.87 kg/m      Wt:   Wt Readings from Last 2 Encounters:   06/28/20 100.4 kg (221 lb 4.8 oz)      Constitutional: NAD, sitting up in bed  Eyes: Sclera icteric  HEENT: MMM  CV: Trace Talha edema  Respiratory: Breathing comfortably on RA  Abd: Obese. Distended, mild TTP in epigastric area. No rebound or " guarding  Skin: Jaundice. Palmar erythema  Neuro: A&O x 3. No asterixis.         Laboratory:   BMP  Recent Labs   Lab 06/29/20  0828 06/28/20  0753 06/27/20  1340 06/27/20  0439 06/26/20  0455   * 129*  --  129* 128*   POTASSIUM 3.8 3.7 3.2* 3.3* 3.5   CHLORIDE 100 98  --  96 95   NAUN 8.9 8.8  --  8.3* 8.1*   CO2 22 20  --  24 24   BUN 38* 42*  --  48* 56*   CR 1.96* 2.21*  --  3.19* 4.17*   * 124*  --  116* 98     CBC  Recent Labs   Lab 06/29/20  0828 06/28/20 0753 06/27/20  0439 06/26/20  0455   WBC 18.3* 19.4* 18.0* 20.3*   RBC 2.55* 2.50* 2.30* 2.53*   HGB 8.6* 8.5* 7.9* 8.5*   HCT 26.2* 25.5* 23.1* 25.2*   * 102* 100 100   MCH 33.7* 34.0* 34.3* 33.6*   MCHC 32.8 33.3 34.2 33.7   RDW 18.2* 18.1* 18.1* 18.4*    220 199 249     INR  Recent Labs   Lab 06/28/20  0753 06/27/20  0439 06/26/20  0455 06/25/20  1525   INR 1.85* 1.87* 1.84* 1.90*     LFTs  Recent Labs   Lab 06/29/20  0828 06/28/20  0753 06/27/20  0439 06/26/20  0455   ALKPHOS 305* 334* 312* 360*   * 126* 112* 113*   ALT 27 27 25 25   BILITOTAL 23.2* 24.9* 23.9* 23.6*   PROTTOTAL 5.9* 6.3* 5.7* 5.5*   ALBUMIN 3.3* 3.8 3.2* 2.7*     Lactate 1.9          Imaging/Procedures/Studies:   No imaging in the last 24 hours

## 2020-06-29 NOTE — PROGRESS NOTES
Brief Nephrology Note      Mr Crowley's Cr is improving, now down to 1.9 with supportive cares.  Can continue fluid restriction with mild but improving hyponatremia, K corrected with replacement.  We are available if renal issues arise but will sign off from formally following.      Richard Freitas, APRN CNS  Clinical Nurse Specialist  [phone]

## 2020-06-29 NOTE — PLAN OF CARE
"Recently diagnosed cirrhosis, c/b GERTRUDIS-resolving.    /74 (BP Location: Left arm)   Pulse 105   Temp 96.2  F (35.7  C) (Oral)   Resp 13   Ht 1.803 m (5' 11\")   Wt 100 kg (220 lb 6.4 oz)   SpO2 99%   BMI 30.74 kg/m      A+Ox4, pleasant. Abd is dinstended. +BS, passing flatus, no BM this shift. Good uop but dark/tea colored, Urine specimen sent for labs.  Overall skin is jaundiced including sclera. Good appetite, on 2 gms NA diet and 1.5 liters fluid restriction. UAL in room . C/o's abd apin controlled with prn oxycodone. PIV sl'd. NPO at midnight for abd US in the morning(needs 8hrs npo prior to procedure).   Continue to monitor gen status and continue with POC.    "

## 2020-06-29 NOTE — PLAN OF CARE
Afebrile. Intermittently tachy, low 100's. OVSS. Continues to have abdominal pain/discomfort, oxycodone given x3 with adequate relief. Denies n/v/d. Sepsis alert triggered, lactic was 1.9. 2g Na diet and 1.5L fluid restrictions, needs reminders about the importance of adhering to this. Voiding dark sushma urine. No stools. Up independently. Continue plan of care.         Problem: Adult Inpatient Plan of Care  Goal: Plan of Care Review  6/29/2020 0526 by Sapna Felipe RN  Outcome: No Change  Flowsheets (Taken 6/29/2020 0526)  Plan of Care Reviewed With: patient  Progress: no change     Problem: Adult Inpatient Plan of Care  Goal: Patient-Specific Goal (Individualization)  6/29/2020 0526 by Sapna Felipe RN  Outcome: No Change     Problem: Adult Inpatient Plan of Care  Goal: Absence of Hospital-Acquired Illness or Injury  6/29/2020 0526 by Sapna Felipe RN  Outcome: No Change     Problem: Adult Inpatient Plan of Care  Goal: Optimal Comfort and Wellbeing  6/29/2020 0526 by Sapna Felipe RN  Outcome: No Change     Problem: Adult Inpatient Plan of Care  Goal: Readiness for Transition of Care  6/29/2020 0526 by Sapna Felipe RN  Outcome: No Change     Problem: Adult Inpatient Plan of Care  Goal: Rounds/Family Conference  6/29/2020 0526 by Sapna Felipe RN  Outcome: No Change     Problem: Fluid Imbalance (Liver Failure)  Goal: Optimal Fluid Balance  6/29/2020 0526 by Sapna Felipe RN  Outcome: No Change     Problem: Pain (Liver Failure)  Goal: Acceptable Pain Control  6/29/2020 0526 by Sapna Felipe RN  Outcome: No Change

## 2020-06-29 NOTE — PROGRESS NOTES
"    [facility], [place]    Progress Note - Sylvie 4 Service        Date of Admission:  6/25/2020    Assessment & Plan   34 year old male who is transferred from [facility] to the [facility] on 6/25 for further care by Hepatology team given rising Cr, total bilirubin, and increasing ascites.      Alcoholic hepatitis  Alcoholic cirrhosis  Hyperbilirubinemia  Alcohol use disorder  Patient with first presentation due to liver concerns on 6/11 with work up consistent with alcoholic hepatitis. RUQ with dopplers without PVT, but concerning for fibrosis. EGD did not show EV, but he had portal hypertensive gastropathy and H. pylori. He had paracentesis performed and was discharged on H. pylori triple therapy.   Presented again to [facility] on 6/24 with increased abdominal distension and pain. Found to have increased Cr to 5.1 (BL 0.8), increased bilirubin.   RUQ on 6/25 showed cirrhotic morphology of liver, bidirectional flow in the main and right portal veins.   TTE with EF 60-65%.  SAAG of 1.3 on 6/26 - ascites due to portal hypertension  No SBP - ascites neutrophils of 48    MELD-Na score: 35 at 6/28/2020  7:53 AM  MELD score: 33 at 6/28/2020  7:53 AM  Calculated from:  Serum Creatinine: 2.21 mg/dL at 6/28/2020  7:53 AM  Serum Sodium: 129 mmol/L at 6/28/2020  7:53 AM  Total Bilirubin: 24.9 mg/dL at 6/28/2020  7:53 AM  INR(ratio): 1.85 at 6/28/2020  7:53 AM  Age: 34 years 11 months    Discussed with patient at length regarding his willingness to quit drinking alcohol and go through a rehab program. Regarding rehab programs, gricelda says that he previously told LUCIANO that he, \"would cross that bridge when he got there.\" He says today that he is now there since he is feeling better and would like to talk with SW and chem dep again while here as an inpatient.   - Hepatology consulted, appreciate recs  - start gabapentin 300 mg nightly for EtOH cravings  - will reach out to Chem dep to meet with " patient again  - immunization with Hep A/B upon discharge     Ongoing abdominal pain  Initially abdominal pain felt to be due to ascites, however, patient without significant ascites on US with para. Continuing to need oxycodone on 6/29.   - repeat complete abdominal ultrasound in AM  - NPO at midnight for U/S    GERTRUDIS  Hyponatremia  Patient with reported decreased urine output since discharge from hospital on 6/15. Denies hematuria, dysuria. Given GERTRUDIS in setting of alcoholic hepatitis and likely cirrhosis, initially there was concern for hepatorenal syndrome, however, patient responded well to albumin challenge. Sodium improving with fluid restriction.  - Hepatology consulted, appreciate recs  - Nephrology consulted, appreciate recs  - strict I/Os  - daily weights     Hypokalemia  Likely due to underlying liver disease.   - potassium replacement protocol     H. pylori gastritis  - may stop H. pylori treatment (PPI, amoxicillin, clarithromycin), started on 6/14 and planned as 2 week course. Will continue PPI for now.      Severe malnutrition   In the setting of underlying liver disease and poor oral nutrition.   Alb 1.6  - continue to monitor  - 2 gram sodium diet      Diet: 2 gram Na diet  Fluids: None  DVT Prophylaxis: Pneumatic Compression Devices  Constantino Catheter: not present  Code Status: Full code           Disposition Plan   Expected discharge: 1-2 days, recommended to prior living arrangement- pending improvement and stability of kidney function.  Entered: Derek Laboy MD 06/29/2020, 8:01 AM       The patient's care was discussed with the Attending Physician, Dr. Ramon.    Derek Laboy MD  [address], [place]  Pager: [phone]  Please see sticky note for cross cover information  ______________________________________________________________________    Interval History   NAEO. Patient states that he continues to have abdominal pain and is still requiring  oxycodone for his pain. He reports that he feels like he is now ready to talk with SW/chem dep regarding rehab programs for his drinking. He says he never said no to rehab previously, but felt he wasn't ready to talk about it yet because he was still feeling sick.     4-point ROS is otherwise unremarkable.    Data reviewed today: I reviewed all medications, new labs and imaging results over the last 24 hours.    Physical Exam   Vital Signs: Temp: (P) 96.7  F (35.9  C) Temp src: (P) Oral BP: (P) 117/69 Pulse: 89 Heart Rate: (P) 86 Resp: (P) 16 SpO2: (P) 100 % O2 Device: (P) None (Room air)    Weight: 221 lbs 4.8 oz  Gen: pleasant male sitting up in bed in NAD  HEENT: icteric sclera  CV: RRR, nl S1/S2  Pulm: non-labored breathing on RA, CTAB  Abd: distended, taut, non-tender  Ext: trace bliateral LEAH    Data   Recent Labs   Lab 06/28/20  0753 06/27/20  1340 06/27/20  0439 06/26/20  0455   WBC 19.4*  --  18.0* 20.3*   HGB 8.5*  --  7.9* 8.5*   *  --  100 100     --  199 249   INR 1.85*  --  1.87* 1.84*   *  --  129* 128*   POTASSIUM 3.7 3.2* 3.3* 3.5   CHLORIDE 98  --  96 95   CO2 20  --  24 24   BUN 42*  --  48* 56*   CR 2.21*  --  3.19* 4.17*   ANIONGAP 11  --  9 10   NAUN 8.8  --  8.3* 8.1*   *  --  116* 98   ALBUMIN 3.8  --  3.2* 2.7*   PROTTOTAL 6.3*  --  5.7* 5.5*   BILITOTAL 24.9*  --  23.9* 23.6*   ALKPHOS 334*  --  312* 360*   ALT 27  --  25 25   *  --  112* 113*     No results found for this or any previous visit (from the past 24 hour(s)).  Medications       amoxicillin  1,000 mg Oral BID     clarithromycin  500 mg Oral BID     pantoprazole  40 mg Oral Daily     sodium chloride (PF)  3 mL Intracatheter Q8H

## 2020-06-30 ENCOUNTER — APPOINTMENT (OUTPATIENT)
Dept: ULTRASOUND IMAGING | Facility: CLINIC | Age: 35
End: 2020-06-30
Attending: INTERNAL MEDICINE
Payer: MEDICAID

## 2020-06-30 ENCOUNTER — PATIENT OUTREACH (OUTPATIENT)
Dept: CARE COORDINATION | Facility: CLINIC | Age: 35
End: 2020-06-30

## 2020-06-30 ENCOUNTER — APPOINTMENT (OUTPATIENT)
Dept: INTERVENTIONAL RADIOLOGY/VASCULAR | Facility: CLINIC | Age: 35
End: 2020-06-30
Attending: PHYSICIAN ASSISTANT
Payer: MEDICAID

## 2020-06-30 VITALS
BODY MASS INDEX: 30.85 KG/M2 | HEIGHT: 71 IN | SYSTOLIC BLOOD PRESSURE: 107 MMHG | TEMPERATURE: 97.7 F | DIASTOLIC BLOOD PRESSURE: 68 MMHG | OXYGEN SATURATION: 98 % | WEIGHT: 220.4 LBS | HEART RATE: 90 BPM | RESPIRATION RATE: 16 BRPM

## 2020-06-30 LAB
ALBUMIN SERPL-MCNC: 3.1 G/DL (ref 3.4–5)
ALP SERPL-CCNC: 332 U/L (ref 40–150)
ALT SERPL W P-5'-P-CCNC: 30 U/L (ref 0–70)
ANION GAP SERPL CALCULATED.3IONS-SCNC: 10 MMOL/L (ref 3–14)
APPEARANCE FLD: CLEAR
AST SERPL W P-5'-P-CCNC: 111 U/L (ref 0–45)
BASOPHILS NFR FLD MANUAL: 1 %
BILIRUB SERPL-MCNC: 23.6 MG/DL (ref 0.2–1.3)
BUN SERPL-MCNC: 41 MG/DL (ref 7–30)
CALCIUM SERPL-MCNC: 9 MG/DL (ref 8.5–10.1)
CHLORIDE SERPL-SCNC: 101 MMOL/L (ref 94–109)
CO2 SERPL-SCNC: 20 MMOL/L (ref 20–32)
COLOR FLD: YELLOW
CREAT SERPL-MCNC: 2 MG/DL (ref 0.66–1.25)
ERYTHROCYTE [DISTWIDTH] IN BLOOD BY AUTOMATED COUNT: 18.1 % (ref 10–15)
GFR SERPL CREATININE-BSD FRML MDRD: 43 ML/MIN/{1.73_M2}
GLUCOSE SERPL-MCNC: 117 MG/DL (ref 70–99)
GRAM STN SPEC: NORMAL
HCT VFR BLD AUTO: 26.6 % (ref 40–53)
HGB BLD-MCNC: 8.8 G/DL (ref 13.3–17.7)
LYMPHOCYTES NFR FLD MANUAL: 13 %
MCH RBC QN AUTO: 33.7 PG (ref 26.5–33)
MCHC RBC AUTO-ENTMCNC: 33.1 G/DL (ref 31.5–36.5)
MCV RBC AUTO: 102 FL (ref 78–100)
NEUTS BAND NFR FLD MANUAL: 12 %
OTHER CELLS FLD MANUAL: 74 %
PLATELET # BLD AUTO: 238 10E9/L (ref 150–450)
POTASSIUM SERPL-SCNC: 3.6 MMOL/L (ref 3.4–5.3)
PROT SERPL-MCNC: 6 G/DL (ref 6.8–8.8)
RBC # BLD AUTO: 2.61 10E12/L (ref 4.4–5.9)
SODIUM SERPL-SCNC: 131 MMOL/L (ref 133–144)
SPECIMEN SOURCE FLD: NORMAL
SPECIMEN SOURCE: NORMAL
WBC # BLD AUTO: 17.6 10E9/L (ref 4–11)
WBC # FLD AUTO: 349 /UL

## 2020-06-30 PROCEDURE — 87070 CULTURE OTHR SPECIMN AEROBIC: CPT | Performed by: STUDENT IN AN ORGANIZED HEALTH CARE EDUCATION/TRAINING PROGRAM

## 2020-06-30 PROCEDURE — 93975 VASCULAR STUDY: CPT | Mod: TC

## 2020-06-30 PROCEDURE — 87015 SPECIMEN INFECT AGNT CONCNTJ: CPT | Performed by: STUDENT IN AN ORGANIZED HEALTH CARE EDUCATION/TRAINING PROGRAM

## 2020-06-30 PROCEDURE — 89051 BODY FLUID CELL COUNT: CPT | Performed by: STUDENT IN AN ORGANIZED HEALTH CARE EDUCATION/TRAINING PROGRAM

## 2020-06-30 PROCEDURE — 80053 COMPREHEN METABOLIC PANEL: CPT | Performed by: INTERNAL MEDICINE

## 2020-06-30 PROCEDURE — 87205 SMEAR GRAM STAIN: CPT | Performed by: STUDENT IN AN ORGANIZED HEALTH CARE EDUCATION/TRAINING PROGRAM

## 2020-06-30 PROCEDURE — 25000128 H RX IP 250 OP 636: Performed by: STUDENT IN AN ORGANIZED HEALTH CARE EDUCATION/TRAINING PROGRAM

## 2020-06-30 PROCEDURE — 99239 HOSP IP/OBS DSCHRG MGMT >30: CPT | Mod: GC | Performed by: INTERNAL MEDICINE

## 2020-06-30 PROCEDURE — 25000132 ZZH RX MED GY IP 250 OP 250 PS 637: Performed by: STUDENT IN AN ORGANIZED HEALTH CARE EDUCATION/TRAINING PROGRAM

## 2020-06-30 PROCEDURE — 85027 COMPLETE CBC AUTOMATED: CPT | Performed by: INTERNAL MEDICINE

## 2020-06-30 PROCEDURE — 36415 COLL VENOUS BLD VENIPUNCTURE: CPT | Performed by: INTERNAL MEDICINE

## 2020-06-30 PROCEDURE — P9047 ALBUMIN (HUMAN), 25%, 50ML: HCPCS | Performed by: STUDENT IN AN ORGANIZED HEALTH CARE EDUCATION/TRAINING PROGRAM

## 2020-06-30 PROCEDURE — 25000125 ZZHC RX 250: Performed by: RADIOLOGY

## 2020-06-30 PROCEDURE — 0W9G3ZZ DRAINAGE OF PERITONEAL CAVITY, PERCUTANEOUS APPROACH: ICD-10-PCS | Performed by: RADIOLOGY

## 2020-06-30 PROCEDURE — 27210732 IR PARACENTESIS

## 2020-06-30 RX ORDER — ALBUMIN (HUMAN) 12.5 G/50ML
25 SOLUTION INTRAVENOUS ONCE
Status: COMPLETED | OUTPATIENT
Start: 2020-06-30 | End: 2020-06-30

## 2020-06-30 RX ORDER — DEXTROSE MONOHYDRATE 25 G/50ML
25-50 INJECTION, SOLUTION INTRAVENOUS
Status: CANCELLED | OUTPATIENT
Start: 2020-06-30

## 2020-06-30 RX ORDER — GABAPENTIN 300 MG/1
300 CAPSULE ORAL AT BEDTIME
Qty: 7 CAPSULE | Refills: 0 | Status: SHIPPED | OUTPATIENT
Start: 2020-06-30 | End: 2020-07-07

## 2020-06-30 RX ORDER — NICOTINE POLACRILEX 4 MG
15-30 LOZENGE BUCCAL
Status: CANCELLED | OUTPATIENT
Start: 2020-06-30

## 2020-06-30 RX ADMIN — PANTOPRAZOLE SODIUM 40 MG: 40 TABLET, DELAYED RELEASE ORAL at 07:44

## 2020-06-30 RX ADMIN — OXYCODONE HYDROCHLORIDE 10 MG: 5 TABLET ORAL at 17:00

## 2020-06-30 RX ADMIN — OXYCODONE HYDROCHLORIDE 10 MG: 5 TABLET ORAL at 12:47

## 2020-06-30 RX ADMIN — OXYCODONE HYDROCHLORIDE 10 MG: 5 TABLET ORAL at 07:44

## 2020-06-30 RX ADMIN — LIDOCAINE HYDROCHLORIDE 10 ML: 10 INJECTION, SOLUTION EPIDURAL; INFILTRATION; INTRACAUDAL; PERINEURAL at 13:57

## 2020-06-30 RX ADMIN — ALBUMIN HUMAN 25 G: 0.25 SOLUTION INTRAVENOUS at 15:44

## 2020-06-30 ASSESSMENT — PAIN DESCRIPTION - DESCRIPTORS
DESCRIPTORS: ACHING;DISCOMFORT
DESCRIPTORS: ACHING
DESCRIPTORS: ACHING;DISCOMFORT
DESCRIPTORS: ACHING

## 2020-06-30 ASSESSMENT — ACTIVITIES OF DAILY LIVING (ADL)
ADLS_ACUITY_SCORE: 10

## 2020-06-30 NOTE — PROGRESS NOTES
Hepatology Follow up Note           Assessment and Plan:   Ian Crowley is a 34 year old male with PMHx of alcohol use disorder, tobacco use, HTN (not on anti-HTN) and gout who presented with jaundice, abdominal pain and renal injury.     #. Alcoholic Hepatitis  #. Conjugated hyperbilirubinemia  Abdominal US w/doppler without evidence of thrombosis - but coarse echotexture c/f fibrosis. Complications: ascites; no HE or EV/GV.  MELD-Na: 34 (6/30/20)  Transplant: NOT a transplant candidate due to lack of insight into AUD    #. Alcohol use disorder  #. Seizures, presumed 2/2 alcohol use  #. GERTRUDIS, proteinuria  #. Coagulopathy: s/p vitamin K (6/25-6/27)    SCr, LFTs and WBC stable. Ongoing abdominal distension in the setting of ascites with some associated discomfort, minimal SOB.          Recommendations:   -- Consider paracentesis today, remove less than 4L given GERTRUDIS   * If paracentesis done, please send: cell count w/differential, gram stain and culture  -- Continue H. Pylori treatment  -- Gabapentin 300mg daily for AUD (to decrease cravings), increase dose based on response and tolerability in increments of 300 mg every 2 days up to 300 mg 3 times daily   -- Follow up with chemical dependency in the outpatient setting    Health Maintenance:  -- Hepatitis A and B non-immune, recommend vaccine initiation upon discharge    Follow up:  -- Upon discharge, recommend repeat BNP in 5-7 days  -- Patient lives in [place] and would like to establish care with hepatology at [facility] upon discharge, plan for follow up with Dr. Mota (with Dr. Leventhal); order placed for follow up    Patient discussed with Hepatology staff, Dr. Arnold.  Please do not hesitate to contact the Hepatology service with any questions or concerns.     Yolande Mota (Lizzie)  Gastroenterology/Hepatology Fellow  Pager [phone]    The patient was seen and examined.  The above assessment and plan was developed jointly with the fellow.      Moncho Arnold,  "MD         Subjective/Objective:   - No acute events overnight  - Ongoing abdominal distension and associated discomfort, eating well, denies n/v  - Denies fevers, chills or night sweats  - Denies chest pain or SOB         Medications:     Current Facility-Administered Medications   Medication     acetaminophen (TYLENOL) tablet 500 mg     glucose gel 15-30 g    Or     dextrose 50 % injection 25-50 mL    Or     glucagon injection 1 mg     gabapentin (NEURONTIN) capsule 300 mg     lidocaine (LMX4) cream     lidocaine 1 % 0.1-1 mL     melatonin tablet 1 mg     naloxone (NARCAN) injection 0.1-0.4 mg     ondansetron (ZOFRAN-ODT) ODT tab 4 mg    Or     ondansetron (ZOFRAN) injection 4 mg     oxyCODONE (ROXICODONE) tablet 5-10 mg     pantoprazole (PROTONIX) EC tablet 40 mg     polyethylene glycol (MIRALAX) Packet 17 g     potassium chloride (KLOR-CON) Packet 20-40 mEq     potassium chloride 10 mEq in 100 mL intermittent infusion with 10 mg lidocaine     potassium chloride 10 mEq in 100 mL sterile water intermittent infusion (premix)     potassium chloride 20 mEq in 50 mL intermittent infusion     potassium chloride ER (KLOR-CON M) CR tablet 20-40 mEq     sodium chloride (PF) 0.9% PF flush 3 mL     sodium chloride (PF) 0.9% PF flush 3 mL            Physical Exam:   VS:  /70 (BP Location: Left arm)   Pulse 91   Temp 96.6  F (35.9  C) (Oral)   Resp 16   Ht 1.803 m (5' 11\")   Wt 100 kg (220 lb 6.4 oz)   SpO2 98%   BMI 30.74 kg/m      Wt:   Wt Readings from Last 2 Encounters:   06/29/20 100 kg (220 lb 6.4 oz)      Constitutional: NAD, sitting up in bed getting his abdominal US done  Eyes: Sclera icteric  HEENT: MMM  CV: Trace Talha edema  Respiratory: Breathing comfortably on RA  Abd: Obese. Distended, mild TTP throughout. No rebound or guarding  Skin: Jaundice. Palmar erythema  Neuro: A&O x 3. No asterixis.         Laboratory:   BMP  Recent Labs   Lab 06/30/20  0649 06/29/20  0828 06/28/20  0753 06/27/20  1340 " 06/27/20  0439   * 131* 129*  --  129*   POTASSIUM 3.6 3.8 3.7 3.2* 3.3*   CHLORIDE 101 100 98  --  96   NAUN 9.0 8.9 8.8  --  8.3*   CO2 20 22 20  --  24   BUN 41* 38* 42*  --  48*   CR 2.00* 1.96* 2.21*  --  3.19*   * 100* 124*  --  116*     CBC  Recent Labs   Lab 06/30/20  0649 06/29/20  0828 06/28/20  0753 06/27/20  0439   WBC 17.6* 18.3* 19.4* 18.0*   RBC 2.61* 2.55* 2.50* 2.30*   HGB 8.8* 8.6* 8.5* 7.9*   HCT 26.6* 26.2* 25.5* 23.1*   * 103* 102* 100   MCH 33.7* 33.7* 34.0* 34.3*   MCHC 33.1 32.8 33.3 34.2   RDW 18.1* 18.2* 18.1* 18.1*    221 220 199     INR  Recent Labs   Lab 06/28/20  0753 06/27/20  0439 06/26/20  0455 06/25/20  1525   INR 1.85* 1.87* 1.84* 1.90*     LFTs  Recent Labs   Lab 06/30/20  0649 06/29/20  0828 06/28/20  0753 06/27/20  0439   ALKPHOS 332* 305* 334* 312*   * 116* 126* 112*   ALT 30 27 27 25   BILITOTAL 23.6* 23.2* 24.9* 23.9*   PROTTOTAL 6.0* 5.9* 6.3* 5.7*   ALBUMIN 3.1* 3.3* 3.8 3.2*          Imaging/Procedures/Studies:   No imaging in the last 24 hours

## 2020-06-30 NOTE — CONSULTS
Patient is a 34 year old male with recurrent ascites, last paracentesis performed on 6/26/20. Patient's team requesting diagnostic and therapeutic paracentesis for recurrent ascites. Team planning on discharge today. Patient will be added to IR schedule on 6/30/20 for diagnostic and therapeutic paracentesis.     Labs WNL for procedure.      Preprocedural orders have been entered. No NPO required.    Please contact the IR control at [phone] for estimated time of procedure.     Case discussed with primary team.    Luis Cervantes PA-C  Interventional Radiology  [phone] pgr.

## 2020-06-30 NOTE — PROGRESS NOTES
Social Work Services Progress Note    Hospital Day: 5  Date of Initial Social Work Evaluation:  Not completed  Collaborated with:  Pt at bedside    Data:  Pt is  34 year old male who is transferred from [facility] to the [facility] on 6/25 for further care by Hepatology team given rising Cr, total bilirubin, and increasing ascites.    Per chart review, Pt discussed w/ primary team that he is interested in learning more about chem dep treatment options. SW met w/ Pt at bedside and provided information for [facility] Unit: [phone] and discussed what a Rule 25 assessment is. Per [facility] 6/30/20, If pt decides to seek CD treatment in the future, and still does not have insurance, [facility] will arrange a CD assessment and will make referrals to treatment.    Intervention:  Chem Dep resources    Assessment:  Pt presented lethargic though expressed appreciation for the information.     Plan:    Anticipated Disposition:  Home, no needs identified    Barriers to d/c plan:  Not identified    Follow Up:  SW to f/u & assist as needed.    JAMES Brannon, LGSW  7C (& 5C off-service) Unit   Phone: [phone]  Pager: [phone]

## 2020-06-30 NOTE — PROGRESS NOTES
Assumed pt from [id number].    Albumin given post paracentesis. Pt  Denies dizziness, lightheadedness. Pain in abdomen controlled in hospital by PRN oxycodone. Pt noted wallet was missing, Security notified and reported no further belongings with them, Security then came to bedside to take report from pt. Pt relations number given to pt.    AVS reviewed with pt and had no further questions. Pt escorted down to discharge pharmacy, medications picked up. Pt discharged to home via car driven by pt's mother. Pt states he feels ready to go home.

## 2020-06-30 NOTE — PROGRESS NOTES
Interventional Radiology Intra-procedural Nursing Note    Patient Name: Ian Crowley  Medical Record Number: [id number]  Today's Date: June 30, 2020       Start Time: 1355  End of procedure time: 1420  Procedure: Diagnostic and therapeutic paracentesis  Fire Safety Score: 0    Consent Review/Timeout Performed by: Dr. Tylor Ceron  Procedure Performed By: Dr. Tylor Ceron    Procedure start time: 1355  Puncture time: 1400  Procedure end time: 1420    Report given to: Starr POWELL  Time pt departs:  1425  : NA    Other Notes:  Alert male transported via cart from inpatient room to IR Procedure Room 6 for planned intervention.  ID band confirmed and patient acknowledges understanding of planned procedure. Patient repositioned to procedure table via hover-mat and positioned supine.  Patient prepped and draped per policy see VS flowsheet, MAR for further information.       Right abdomen site identified using image guidance.  A total of 3120 cc of sushma colored fluid obtained via aspiration.    Patient condition post procedure is stable.   Patient returned to inpatient room for post-procedure monitoring.    Yessenia Marcelo RN

## 2020-06-30 NOTE — DISCHARGE INSTRUCTIONS
Please contact [facility] Unit: [phone] to schedule a Rule 25 (substance use assessment) to see if you d qualify for any treatment resources through your insurance.

## 2020-06-30 NOTE — PROGRESS NOTES
Time: 0232-9250    Reason for admit: Recently diagnosed cirrhosis, c/b GERTRUDIS-resolving     Neuro: A&Ox4, able to make his needs known   Activity: Up independently   Pain: c/o abdominal pain, prn tylenol and oxycodone given   Cardiac: WNL, denies chest pain   Respiratory: WNL on RA, denies SOB   GI/: Voiding without difficulty, + BS, no BM this shift   Diet: 2 gram sodium, 1500 ml FR. NPO at midnight for abdominal US   Lines/Drains: L PIV SL   Skin: Abdomen distended, skin jaundice, no new deficits noted     Events/Plan: Pt resting between cares, needs frequent reminders about fluid restriction. Plan to have abdominal US today     Continue to monitor and follow POC.

## 2020-06-30 NOTE — PLAN OF CARE
AVSS. Patient NPO until after abdominal ultrasound. 2 gram sodium diet with 1.5L fluid restriction restarted. Denied nausea. Pain managed with prn Oxycodone. Up ad [place]. Jaundiced. Abdomen distended. Patient went down for a therapeutic/diagnostic paracentesis, had 3120 ml removed. Continue with plan of care - plan to discharge home this evening.

## 2020-07-01 LAB
BACTERIA SPEC CULT: NO GROWTH
SPECIMEN SOURCE: NORMAL

## 2020-07-01 NOTE — DISCHARGE SUMMARY
[facility], [place]  Discharge Summary - Medicine       Date of Admission:  6/25/2020  Date of Discharge:  6/30/2020  5:34 PM  Discharging Provider: Derek Laboy MD, Guy Mckeon MD  Discharge Service: Sylvie Zuleta    Discharge Diagnoses   Alcoholic hepatitis  Alcoholic cirrhosis  Conjugated hyperbilirubinemia  Alcohol use disorder  Acute kidney injury  Hyponatremia  Hypokalemia  Helicobacter pylori gastritis  Malnutrition    Follow-ups Needed After Discharge   Follow-up Appointments     Adult [facility] Follow-up and recommended labs and tests      Follow up with Gastroenterology at the [facility].     Appointments on [place] and/or [facility] (with [facility] or [facility]   provider or service). Call [phone] if you haven't heard regarding   these appointments within 7 days of discharge.           Unresulted Labs Ordered in the Past 30 Days of this Admission     Date and Time Order Name Status Description    6/30/2020 1152 fluid culture - Aerobic Bacterial Preliminary     6/25/2020 1449 Anaerobic bacterial culture Preliminary       These results will be followed up by inpatient medicine service.     Discharge Disposition   Discharged to home  Condition at discharge: Fair    Hospital Course   Ian Crowley was admitted on 6/25/2020 for acute kidney injury and hyperbilirubinemia. He was a transfer from [facility].      The following problems were addressed during his hospitalization:    Alcoholic hepatitis  Alcoholic cirrhosis  Hyperbilirubinemia  Ascites  Alcohol use disorder  Patient with first presentation due to liver concerns on 6/11 with work up consistent with alcoholic hepatitis. RUQ with dopplers without PVT, but concerning for fibrosis. EGD did not show EV, but he had portal hypertensive gastropathy and H. pylori. He had paracentesis performed and was discharged on H. pylori triple therapy.   Presented again to [facility] on 6/24 with increased  abdominal distension and pain. Found to have increased Cr to 5.1 (BL 0.8), increased bilirubin.   RUQ on 6/25 showed cirrhotic morphology of liver, bidirectional flow in the main and right portal veins.   TTE with EF 60-65%.  SAAG of 1.3 on 6/26 - ascites due to portal hypertension  No SBP - ascites neutrophils of 48    Patient was treated with IV vitamin K 10 mg daily x 3 days without improvement in his INR.     Patient had 2 paracentesis performed during this hospitalization. One on 6/26 drained 1.3 L that was negative for SBP. The second one, was done on the day of discharge (6/30) that drained 3.1 L with labs that were again negative for SBP.       MELD-Na score: 34 at 6/30/2020  6:49 AM  MELD score: 32 at 6/30/2020  6:49 AM  Calculated from:  Serum Creatinine: 2.00 mg/dL at 6/30/2020  6:49 AM  Serum Sodium: 131 mmol/L at 6/30/2020  6:49 AM  Total Bilirubin: 23.6 mg/dL at 6/30/2020  6:49 AM  INR(ratio): 1.85 at 6/28/2020  7:53 AM  Age: 34 years 11 months     Patient was initially hesitant to commit to assistance that was offered via social work or chemical dependency. Later during his admission, he reported that he was open to their assistance. It is recommended to keep pursuing this with patient.   - patient will need to follow up with Hepatology, that will be arranged by them  - he will need a BMP within 5-7 days of discharge  - he was started on gabapentin 300 mg nightly for EtOH cravings, this will be titrated up as tolerated by Hepatology  - patient will require immunization with Hep A/B at follow up     GERTRUDIS  Hyponatremia  Hypokalemia  Patient with reported decreased urine output since discharge from hospital on 6/15. Denied hematuria, dysuria. Presented with Cr of 5.1 to outside hospital. Given GERTRUDIS in setting of alcoholic hepatitis and likely cirrhosis, initially there was concern for hepatorenal syndrome, however, patient responded well to albumin challenge. Sodium improved with fluid restriction.  "Potassium was replaced as needed. Nephrology was consulted and assisted with this patient.   No diuretics were started in the acute setting of his GERTRUDIS. Patient will need follow up with Hepatology.   On day of discharge, his Cr was 2.     H. pylori gastritis  Patient had biopsy at previous hospitalization that showed H. pylori gastritis. He was treated with 2 full weeks of triple therapy (PPI, amoxicillin, clarithromycin), which ended on 6/29.   - he was discharged on only PPI     Severe malnutrition   In the setting of underlying liver disease and poor oral nutrition.   Alb 1.6  - continue to monitor  - 2 gram sodium diet    Consultations This Hospital Stay   NEPHROLOGY GENERAL ADULT IP CONSULT  GI HEPATOLOGY ADULT IP CONSULT  PHYSICAL THERAPY ADULT IP CONSULT  OCCUPATIONAL THERAPY ADULT IP CONSULT  NEPHROLOGY GENERAL ADULT IP CONSULT  SOCIAL WORK IP CONSULT  INTERVENTIONAL RADIOLOGY ADULT/PEDS IP CONSULT  CHEMICAL DEPENDENCY IP CONSULT  PSYCHIATRY IP CONSULT  MEDICATION HISTORY IP PHARMACY CONSULT  INTERVENTIONAL RADIOLOGY ADULT/PEDS IP CONSULT  INTERVENTIONAL RADIOLOGY ADULT/PEDS IP CONSULT    Code Status   Full Code       The patient was discussed with Dr. Guy Mckeon.    Derek Laboy MD  [address]  Pager: [phone]  ______________________________________________________________________    Physical Exam   BP Readings from Last 1 Encounters:   06/30/20 107/68     Pulse Readings from Last 1 Encounters:   06/30/20 90     Wt Readings from Last 1 Encounters:   06/29/20 100 kg (220 lb 6.4 oz)     Ht Readings from Last 1 Encounters:   06/25/20 1.803 m (5' 11\")     Estimated body mass index is 30.74 kg/m  as calculated from the following:    Height as of this encounter: 1.803 m (5' 11\").    Weight as of this encounter: 100 kg (220 lb 6.4 oz).    Temp Readings from Last 1 Encounters:   06/30/20 97.7  F (36.5  C) (Oral)     Gen: reclining in bed in NAD  HEENT: icteric " sclera  CV: RRR, nl S1/S2, no r/m/g  Pulm: CTAB, non-labored breathing on RA  Abd: distended, diffusely and mildly tender to palpation  Ext: no enrique LYNN      Primary Care Physician   Physician No Ref-Primary    Discharge Orders      Basic metabolic panel     Reason for your hospital stay    Alcoholic cirrhosis complicated by ascites  Acute kidney injury     Adult [facility] Follow-up and recommended labs and tests    Follow up with Gastroenterology at the [facility].     Appointments on [place] and/or [facility] (with [facility] or [facility] provider or service). Call [phone] if you haven't heard regarding these appointments within 7 days of discharge.     Activity    Your activity upon discharge: activity as tolerated     Full Code     Diet    Follow this diet upon discharge: Orders Placed This Encounter      Snacks/Supplements Adult: Boost Plus; Between Meals      Fluid restriction 1500 ML FLUID      2 Gram Sodium Diet       Significant Results and Procedures   Results for orders placed or performed during the hospital encounter of 06/25/20   IR Paracentesis    Narrative    Procedure date: 6/26/2020:    1. Ultrasound guided diagnostic and therapeutic paracentesis.    Preop diagnosis: Ascites.    Postop diagnosis: Same.    Proceduralist: Luis Cervantes PA-C    Assist: None.    Medications: No intravenous sedation was administered. 1% lidocaine,  10 cc.     Nursing: The patient was placed on continuous monitoring by IR nursing  staff under my supervision. The patient remained stable throughout the  procedure.    PROCEDURE: The patient understood the limitations, alternatives, and  risks of the procedure and requested the procedure be performed. Both  written and oral consent were obtained.    The patient was identified by name and date of birth, and placed in  the recumbent position. The right lower quadrant was prepped and  draped in the usual sterile fashion. Ultrasound evaluation revealed a  small to  moderate fluid collection at this location, and an image was  saved. The site overlying the fluid was anesthetized with 1%  lidocaine. Under ultrasound guidance, a 5 [place] Island[place], 10 cm straight Yueh  catheter was advanced into the abdominal space, and an image was  saved. Syringe aspiration revealed initial return of clear yellow  fluid. 1200 cc ascites aspirated and sent for labs as ordered.  Catheter to vacuum drainage, with 1280 cc ascites aspirated. Catheter  removed. Sterile dressing applied. Images were saved throughout the  procedure. Procedure was well tolerated, with no immediate  complications.    Estimate blood loss: Less than 1 cc.    Specimens: 120 cc clear yellow ascites.      Impression    IMPRESSION: Ultrasound guided diagnostic and therapeutic paracentesis.  Total of  1400 cc of clear yellow ascites aspirated.    PLAN: Followup per primary team.    Attestation: The physician assistant (PA) who performed this procedure  and signed the above report is licensed to practice in the [facility] pursuant to [place] Statute 147A.09.  This includes meeting the  Statute and [place] Board of Medical Practice requirement of an  active Delegation Agreement, which documents delegation of services by  primary and alternate supervising physicians. All services rendered  are performed under a collaborative agreement with Dr. Estrada Kuhn, Director of Interventional Radiology, [facility].    SHIRA SHERWOOD PA-C   US Abdomen Complete w Doppler Complete    Narrative    EXAM: US Abdomen Complete with Doppler, 6/25/2020 7:06 PM     HISTORY: 34y male with alcholic hepatitis and possible cirrhosis with  ascites. Please assess for portal vein thrombosis..    COMPARISON: None..    TECHNIQUE: The abdomen was scanned in standard fashion with  specialized ultrasound transducer(s) using both gray-scale, color  Doppler, and spectral flow techniques.    FINDINGS:  Liver: 19.9 cm craniocaudally with  increased echogenicity and lobular  surface contour.. No focal mass.     Extrahepatic portal vein flow is to and fro though predominately  retrograde predominantly retrograde at 8 cm/s.  Right portal vein flow is to and fro though predominately retrograde  predominantly retrograde, measuring 10 cm/s.  Left portal vein flow is antegrade, measuring 9 cm/s.    Flow in the hepatic artery is towards the liver and:  149 cm/s peak systolic.  0.60 resistive index.     The splenic vein is patent and flow is towards the liver.  The left,  middle, and right hepatic veins are patent with flow towards the IVC.  The IVC is patent with flow towards the heart.   The visualized aorta  is not dilated.    Gallbladder: Gallbladder sludge. No shadowing stone, wall thickening,  pericholecystic fluid. Sonographic Reynolds sign is negative..    Bile Ducts: Both the intra- and extrahepatic biliary system are of  normal caliber.  The common bile duct measures mm in diameter.    Pancreas: Visualized portions of the head and body of the pancreas are  unremarkable.     Right Kidney: 13.0 in length. No shadowing stones. hydronephrosis or  hydroureter. No cystic lesion or mass.   Left Kidney: 13.3 in length. No shadowing stones, hydronephrosis or  hydroureter. No cystic lesion or mass.     Spleen: 15.1 in sagittal dimension.    Aorta and IVC: Visualized portions are within normal limits.    Fluid: Small volume intra-abdominal ascites.       Impression    IMPRESSION:   1. Cirrhotic morphology of the liver with bidirectional, predominantly  retrograde flow in the main and right portal veins likely representing  pleural hypertension.  2. Splenomegaly, likely secondary to above.  3. Biliary sludge without evidence of cholecystitis    I have personally reviewed the examination and initial interpretation  and I agree with the findings.    RITA TREADWELL MD   US Abdomen Complete w Doppler Complete    Narrative    EXAMINATION: US ABDOMEN COMPLETE WITH  DOPPLER COMPLETE, 6/30/2020  10:44 AM     COMPARISON: Abdomen ultrasound 6/25/2020    HISTORY: 34-year-old male with cirrhosis with dense abdomen, please  evaluate for ascites    TECHNIQUE: The abdomen was scanned in standard fashion with  specialized ultrasound transducer(s) using both grey scale and limited  color Doppler techniques.    Findings:  Liver: The liver demonstrates coarse echotexture. The liver measures  23.4 cm in craniocaudal dimension. No evidence of a focal hepatic mass  or intrahepatic biliary ductal dilatation. The main portal vein is  patent with antegrade flow.    Gallbladder: The gallbladder is well distended and of normal  morphology. Diffuse gallbladder wall thickening measuring 5 mm in  thickness, nonspecific in the setting of hepatic parenchymal disease  and ascites. No sonographic Reynolds's sign. Stable biliary sludge.    Bile Ducts: Both the intra- and extrahepatic biliary system are of  normal caliber.  The common bile duct measures 5 mm in diameter.    Pancreas: Visualized portions of the head and body of the pancreas are  unremarkable.    Kidneys: Both kidneys are of normal echotexture, without mass nor  hydronephrosis. The left kidney is partially obscured. The  craniocaudal dimensions are: right- 12.9 cm, left- 11.8 cm.    Spleen: The spleen is enlarged, measuring 16.4 cm in sagittal  dimension. Splenule noted.    Aorta and IVC: The visualized portions of the aorta and IVC are  unremarkable. The aorta measures 2.5cm in diameter and the IVC  measures 1.8 cm in diameter.    Fluid: Ascites present.    Doppler examination of the hepatic vessels:      MPV:       Extrahepatic Diameter on gray scale: 1.0 mm       Extrahepatic Flow: Retrograde. Peak velocity: 10 cm/sec       RPV Flow: Retrograde. Peak velocity: 12 cm/sec       LPV Flow: Antegrade. Peak velocity: 12 cm/sec      Hepatic Artery:       Extrahepatic: Peak velocity 101 cm/sec, Resistive index 0.56       Right Intrahepatic: Peak  velocity 86 cm/sec, Resistive index 0.54       Left Intrahepatic: Peak velocity 106 cm/sec, Resistive index 0.45      Hepatic Veins:       RHV waveform: Decreased phasicity was spectral broadening       MHV waveform: Decreased phasicity was spectral broadening       LHV waveform: Decreased phasicity was spectral broadening      Splenic Frankie:       Flow: Anterograde, 18 cm/s at the splenic hilum. Proximal splenic  vein is not well seen.      IVC:       Flow: Patent with appropriate directional flow toward the heart.    Recannulized periumbilical vein noted with antegrade flow of 24 cm/s.        Impression    Impression:   1.  Hepatomegaly and cirrhosis without focal liver mass.  2.  Sequela of portal hypertension including splenomegaly, small  volume of ascites, retrograde flow in the main portal vein and right  portal vein, and recanalized paraumbilical vein. Patent Doppler  evaluation.  3.  Biliary sludge.    I have personally reviewed the examination and initial interpretation  and I agree with the findings.    JAE MENDOZA MD   Echo Complete    Narrative    [id number]  [id number]  [id number]  [id number]^GEMINI^ABEL^STIVEN           [facility],[place]  Echocardiography Laboratory  [address] [place], [place] [address]     Name: AIDE DURÁN  MRN: [id number]  : 1985  Study Date: 2020 03:28 PM  Age: 34 yrs  Gender: Male  Patient Location: [facility]  Reason For Study: Edema  Ordering Physician: ABEL SINGLETARY  Referring Physician: SYSTEM, PROVIDER NOT IN  Performed By: Zafar Rai RDCS     BSA: 2.2 m2  Height: 71 in  Weight: 215 lb  HR: 84  BP: 102/70 mmHg  _____________________________________________________________________________  __        Procedure  Complete Portable Echo Adult.  _____________________________________________________________________________  __        Interpretation Summary  Global and regional left ventricular function is normal with an EF of  60-65%.  Global right ventricular function is normal.  Normal LV diastolic function with normal left and right-sided filling  pressures.  No significant valvular abnormalities.  No pericardial effusion is present.     There is no prior study for direct comparison.  _____________________________________________________________________________  __        Left Ventricle  Left ventricular function, chamber size, wall motion, and wall thickness are  normal.The EF is 60-65%. Left ventricular diastolic function is normal.  Diastolic Doppler findings (E/E' ratio and/or other parameters) suggest left  ventricular filling pressures are normal. No regional wall motion  abnormalities are seen. A false tendon is noted.     Right Ventricle  The right ventricle is normal size. Global right ventricular function is  normal.     Atria  The right atria appears normal. Mild left atrial enlargement is present.     Mitral Valve  The mitral valve is normal. Trace mitral insufficiency is present.        Aortic Valve  Aortic valve is normal in structure and function. The aortic valve is  tricuspid. No aortic regurgitation is present.     Tricuspid Valve  The tricuspid valve is normal. Trace tricuspid insufficiency is present. The  peak velocity of the tricuspid regurgitant jet is not obtainable. Pulmonary  artery systolic pressure cannot be assessed.     Pulmonic Valve  The pulmonic valve is normal.     Vessels  The aorta root is normal. The thoracic aorta is normal. IVC diameter and  respiratory changes fall into an intermediate range suggesting an RA pressure  of 8 mmHg.     Pericardium  No pericardial effusion is present.        Compared to Previous Study  There is no prior study for direct comparison.     Attestation  I have personally viewed the imaging and agree with the interpretation and  report as documented by the fellow, Destiny Pelletier MD, and/or edited by  me.  _____________________________________________________________________________  __     MMode/2D Measurements & Calculations  IVSd: 1.1 cm  LVIDd: 5.2 cm  LVIDs: 3.1 cm  LVPWd: 0.72 cm  FS: 40.8 %  LV mass(C)d: 173.5 grams  LV mass(C)dI: 79.8 grams/m2  asc Aorta Diam: 3.2 cm  LVOT diam: 2.3 cm  LVOT area: 4.3 cm2  LA Volume Index (BP): 38.3 ml/m2  RWT: 0.28     TAPSE: 1.9 cm        Doppler Measurements & Calculations  MV E max rei: 61.5 cm/sec  MV A max rei: 55.5 cm/sec  MV E/A: 1.1  MV dec slope: 313.5 cm/sec2  MV dec time: 0.20 sec  PA acc time: 0.12 sec  E/E' av.1  Lateral E/e': 5.1  Medial E/e': 5.2     _____________________________________________________________________________  __           Report approved by: Chaitanya Yu 2020 04:12 PM            Discharge Medications   Discharge Medication List as of 2020  3:37 PM      START taking these medications    Details   gabapentin (NEURONTIN) 300 MG capsule Take 1 capsule (300 mg) by mouth At Bedtime for 7 days, Disp-7 capsule,R-0, E-Prescribe         CONTINUE these medications which have NOT CHANGED    Details   pantoprazole (PROTONIX) 20 MG EC tablet Take 40 mg by mouth daily, Historical         STOP taking these medications       amoxicillin (AMOXIL) 500 MG capsule Comments:   Reason for Stopping:         clarithromycin (BIAXIN) 500 MG tablet Comments:   Reason for Stopping:         naproxen sodium 220 MG capsule Comments:   Reason for Stopping:             Allergies   No Known Allergies

## 2020-07-01 NOTE — PROGRESS NOTES
"[facility]: Post-Discharge Note  SITUATION                                                      Admission:    Admission Date: 06/25/20   Reason for Admission: Alcoholic hepatitis with ascites  Discharge:   Discharge Date: 06/30/20  Discharge Diagnosis: Alcoholic hepatitis with ascites  Discharge Service: General Medicine  Discharge Plan: fu pcp    BACKGROUND                                                      34 year old male who is transferred from [facility] to the [facility] on 6/25 for further care by Hepatology team given rising Cr, total bilirubin, and increasing ascites.        ASSESSMENT      Discharge Assessment  Patient reports symptoms are: Improved(stated he is not bloated and pain is at a 3 today without medication. he is feeling better and \"hoping he can keep making good choices\")  Does the patient have all of their medications?: Yes  Does patient know what their new medications are for?: Yes  Does patient have a follow-up appointment scheduled?: Yes  Does patient have any other questions or concerns?: No    Post-op  Did the patient have surgery or a procedure: No  Fever: No  Chills: No  Eating & Drinking: eating and drinking without complaints/concerns  PO Intake: regular diet  Bowel Function: normal  Urinary Status: voiding without complaint/concerns        PLAN                                                      Outpatient Plan:      No future appointments.        Alexandra Montano"

## 2020-07-03 LAB
BACTERIA SPEC CULT: NORMAL
Lab: NORMAL
SPECIMEN SOURCE: NORMAL

## 2020-07-05 LAB
BACTERIA SPEC CULT: NO GROWTH
SPECIMEN SOURCE: NORMAL

## 2020-07-06 ENCOUNTER — APPOINTMENT (OUTPATIENT)
Dept: CT IMAGING | Facility: CLINIC | Age: 35
End: 2020-07-06
Attending: EMERGENCY MEDICINE
Payer: MEDICAID

## 2020-07-06 ENCOUNTER — HOSPITAL ENCOUNTER (EMERGENCY)
Facility: CLINIC | Age: 35
Discharge: HOME | End: 2020-07-06
Attending: EMERGENCY MEDICINE | Admitting: EMERGENCY MEDICINE
Payer: MEDICAID

## 2020-07-06 VITALS
BODY MASS INDEX: 30.8 KG/M2 | WEIGHT: 220 LBS | DIASTOLIC BLOOD PRESSURE: 75 MMHG | HEIGHT: 71 IN | HEART RATE: 96 BPM | TEMPERATURE: 98.3 F | RESPIRATION RATE: 16 BRPM | OXYGEN SATURATION: 100 % | SYSTOLIC BLOOD PRESSURE: 116 MMHG

## 2020-07-06 DIAGNOSIS — R10.9 FLANK PAIN: ICD-10-CM

## 2020-07-06 DIAGNOSIS — K72.00 SUBACUTE LIVER FAILURE WITHOUT HEPATIC COMA: ICD-10-CM

## 2020-07-06 DIAGNOSIS — K70.11 ALCOHOLIC HEPATITIS WITH ASCITES (H): ICD-10-CM

## 2020-07-06 LAB
ALBUMIN SERPL-MCNC: 2.5 G/DL (ref 3.4–5)
ALBUMIN UR-MCNC: 10 MG/DL
ALP SERPL-CCNC: 418 U/L (ref 40–150)
ALT SERPL W P-5'-P-CCNC: 26 U/L (ref 0–70)
ANION GAP SERPL CALCULATED.3IONS-SCNC: 10 MMOL/L (ref 3–14)
APPEARANCE FLD: NORMAL
APPEARANCE UR: CLEAR
AST SERPL W P-5'-P-CCNC: 94 U/L (ref 0–45)
BASOPHILS # BLD AUTO: 0.1 10E9/L (ref 0–0.2)
BASOPHILS NFR BLD AUTO: 0.5 %
BILIRUB SERPL-MCNC: 15.7 MG/DL (ref 0.2–1.3)
BILIRUB UR QL STRIP: ABNORMAL
BUN SERPL-MCNC: 39 MG/DL (ref 7–30)
CALCIUM SERPL-MCNC: 8.7 MG/DL (ref 8.5–10.1)
CHLORIDE SERPL-SCNC: 103 MMOL/L (ref 94–109)
CO2 SERPL-SCNC: 20 MMOL/L (ref 20–32)
COLOR FLD: YELLOW
COLOR UR AUTO: ABNORMAL
CREAT SERPL-MCNC: 2 MG/DL (ref 0.66–1.25)
DIFFERENTIAL METHOD BLD: ABNORMAL
EOSINOPHIL # BLD AUTO: 0.4 10E9/L (ref 0–0.7)
EOSINOPHIL NFR BLD AUTO: 1.8 %
ERYTHROCYTE [DISTWIDTH] IN BLOOD BY AUTOMATED COUNT: 16.6 % (ref 10–15)
GFR SERPL CREATININE-BSD FRML MDRD: 43 ML/MIN/{1.73_M2}
GLUCOSE FLD-MCNC: 104 MG/DL
GLUCOSE SERPL-MCNC: 97 MG/DL (ref 70–99)
GLUCOSE UR STRIP-MCNC: NEGATIVE MG/DL
HCT VFR BLD AUTO: 29.4 % (ref 40–53)
HGB BLD-MCNC: 9.6 G/DL (ref 13.3–17.7)
HGB UR QL STRIP: NEGATIVE
IMM GRANULOCYTES # BLD: 0.2 10E9/L (ref 0–0.4)
IMM GRANULOCYTES NFR BLD: 0.8 %
INR PPP: 1.67 (ref 0.86–1.14)
KETONES UR STRIP-MCNC: NEGATIVE MG/DL
LDH FLD L TO P-CCNC: 53 U/L
LEUKOCYTE ESTERASE UR QL STRIP: NEGATIVE
LYMPHOCYTES # BLD AUTO: 1.3 10E9/L (ref 0.8–5.3)
LYMPHOCYTES NFR BLD AUTO: 5.5 %
LYMPHOCYTES NFR FLD MANUAL: 19 %
MCH RBC QN AUTO: 32.9 PG (ref 26.5–33)
MCHC RBC AUTO-ENTMCNC: 32.7 G/DL (ref 31.5–36.5)
MCV RBC AUTO: 101 FL (ref 78–100)
MONOCYTES # BLD AUTO: 1.3 10E9/L (ref 0–1.3)
MONOCYTES NFR BLD AUTO: 5.2 %
MUCOUS THREADS #/AREA URNS LPF: PRESENT /LPF
NEUTROPHILS # BLD AUTO: 20.9 10E9/L (ref 1.6–8.3)
NEUTROPHILS NFR BLD AUTO: 86.2 %
NEUTS BAND NFR FLD MANUAL: 30 %
NITRATE UR QL: NEGATIVE
NRBC # BLD AUTO: 0 10*3/UL
NRBC BLD AUTO-RTO: 0 /100
OTHER CELLS FLD MANUAL: 51 %
PH UR STRIP: 5.5 PH (ref 5–7)
PLATELET # BLD AUTO: 277 10E9/L (ref 150–450)
POTASSIUM SERPL-SCNC: 3.5 MMOL/L (ref 3.4–5.3)
PROT FLD-MCNC: 2.9 G/DL
PROT SERPL-MCNC: 6.2 G/DL (ref 6.8–8.8)
RBC # BLD AUTO: 2.92 10E12/L (ref 4.4–5.9)
RBC #/AREA URNS AUTO: <1 /HPF (ref 0–2)
SODIUM SERPL-SCNC: 133 MMOL/L (ref 133–144)
SOURCE: ABNORMAL
SP GR UR STRIP: 1.02 (ref 1–1.03)
SPECIMEN SOURCE FLD: NORMAL
SQUAMOUS #/AREA URNS AUTO: 1 /HPF (ref 0–1)
TRANS CELLS #/AREA URNS HPF: <1 /HPF (ref 0–1)
UROBILINOGEN UR STRIP-MCNC: 4 MG/DL (ref 0–2)
WBC # BLD AUTO: 24.3 10E9/L (ref 4–11)
WBC # FLD AUTO: 291 /UL
WBC #/AREA URNS AUTO: 1 /HPF (ref 0–5)

## 2020-07-06 PROCEDURE — 87086 URINE CULTURE/COLONY COUNT: CPT | Performed by: EMERGENCY MEDICINE

## 2020-07-06 PROCEDURE — 99284 EMERGENCY DEPT VISIT MOD MDM: CPT | Mod: 25 | Performed by: EMERGENCY MEDICINE

## 2020-07-06 PROCEDURE — 85025 COMPLETE CBC W/AUTO DIFF WBC: CPT | Performed by: EMERGENCY MEDICINE

## 2020-07-06 PROCEDURE — 99285 EMERGENCY DEPT VISIT HI MDM: CPT | Mod: 25

## 2020-07-06 PROCEDURE — 80053 COMPREHEN METABOLIC PANEL: CPT | Performed by: EMERGENCY MEDICINE

## 2020-07-06 PROCEDURE — 49082 ABD PARACENTESIS: CPT | Mod: Z6 | Performed by: EMERGENCY MEDICINE

## 2020-07-06 PROCEDURE — 83615 LACTATE (LD) (LDH) ENZYME: CPT | Performed by: EMERGENCY MEDICINE

## 2020-07-06 PROCEDURE — 87205 SMEAR GRAM STAIN: CPT | Performed by: EMERGENCY MEDICINE

## 2020-07-06 PROCEDURE — 89051 BODY FLUID CELL COUNT: CPT | Performed by: EMERGENCY MEDICINE

## 2020-07-06 PROCEDURE — 25000132 ZZH RX MED GY IP 250 OP 250 PS 637: Performed by: EMERGENCY MEDICINE

## 2020-07-06 PROCEDURE — 84157 ASSAY OF PROTEIN OTHER: CPT | Performed by: EMERGENCY MEDICINE

## 2020-07-06 PROCEDURE — 81001 URINALYSIS AUTO W/SCOPE: CPT | Performed by: EMERGENCY MEDICINE

## 2020-07-06 PROCEDURE — 82945 GLUCOSE OTHER FLUID: CPT | Performed by: EMERGENCY MEDICINE

## 2020-07-06 PROCEDURE — 87075 CULTR BACTERIA EXCEPT BLOOD: CPT | Performed by: EMERGENCY MEDICINE

## 2020-07-06 PROCEDURE — 74176 CT ABD & PELVIS W/O CONTRAST: CPT

## 2020-07-06 PROCEDURE — 87070 CULTURE OTHR SPECIMN AEROBIC: CPT | Performed by: EMERGENCY MEDICINE

## 2020-07-06 PROCEDURE — 85610 PROTHROMBIN TIME: CPT | Performed by: EMERGENCY MEDICINE

## 2020-07-06 PROCEDURE — 49082 ABD PARACENTESIS: CPT

## 2020-07-06 PROCEDURE — 87015 SPECIMEN INFECT AGNT CONCNTJ: CPT | Performed by: EMERGENCY MEDICINE

## 2020-07-06 RX ORDER — OXYCODONE HYDROCHLORIDE 5 MG/1
5 TABLET ORAL EVERY 6 HOURS PRN
Qty: 6 TABLET | Refills: 0 | Status: SHIPPED | OUTPATIENT
Start: 2020-07-06 | End: 2024-05-30

## 2020-07-06 RX ORDER — OXYCODONE HYDROCHLORIDE 5 MG/1
5 TABLET ORAL ONCE
Status: COMPLETED | OUTPATIENT
Start: 2020-07-06 | End: 2020-07-06

## 2020-07-06 RX ADMIN — OXYCODONE HYDROCHLORIDE 5 MG: 5 TABLET ORAL at 21:38

## 2020-07-06 RX ADMIN — OXYCODONE HYDROCHLORIDE 5 MG: 5 TABLET ORAL at 19:07

## 2020-07-06 ASSESSMENT — MIFFLIN-ST. JEOR: SCORE: 1970.04

## 2020-07-06 NOTE — ED AVS SNAPSHOT
[facility], [place], Emergency Department  [address] [place] [id number]  Phone:  [phone]                                    Ian Crowley   MRN: [id number]    Department:  [facility], Emergency Department   Date of Visit:  7/6/2020           After Visit Summary Signature Page    I have received my discharge instructions, and my questions have been answered. I have discussed any challenges I see with this plan with the nurse or doctor.    ..........................................................................................................................................  Patient/Patient Representative Signature      ..........................................................................................................................................  Patient Representative Print Name and Relationship to Patient    ..................................................               ................................................  Date                                   Time    ..........................................................................................................................................  Reviewed by Signature/Title    ...................................................              ..............................................  Date                                               Time          22EPIC Rev 08/18

## 2020-07-06 NOTE — ED PROVIDER NOTES
[place] EMERGENCY DEPARTMENT ([facility])  7/06/20    History     Chief Complaint   Patient presents with     Bloated     The history is provided by the patient and medical records.     Ian Crowley is a 34 year old male with a past medical history significant for alcoholic cirrhosis, ascites, alcohol abuse, gout, HTN, and tobacco abuse who presents here to the Emergency Department due to abdominal distention and abdominal pain.    Per chart review patient had a recent admission here from 6/25/2020-6/30/2020 as a transfer from [facility] due to acute kidney injury and hyperbilirubinemia.  Was noted that his first presentation due to liver concerns was on 6/11 which was consistent with alcoholic hepatitis.  At that time RUQ Dopplers were concerning for fibrosis and EGD showed portal hypertensive gastropathy and H-pylori.  Patient had paracentesis performed and was discharged on H. pylori triple therapy.  He then presented to [facility] ED on 6/24 due to increased abdominal distention and pain and was found to have a creatinine of 5.1 with increased bilirubin.  On 6/25 RUQ showed cirrhotic morphology of the liver, bidirectional flow in the main and right portal veins.  Patient had 2 paracentesis performed during her hospital stay both negative for SBP.    Here in the ED patient reports worsening abdominal distention and right flank pain.    Recent Labs   Lab Test 07/06/20  1729  06/25/20  1525      < > 128*   POTASSIUM 3.5   < > 3.0*   CHLORIDE 103   < > 93*   CO2 20   < > 24   BUN 39*   < > 56*   CR 2.00*   < > 4.86*   NAUN 8.7   < > 7.7*   MAG  --   --  3.2*   PHOS  --   --  4.7*   PROTTOTAL 6.2*   < > 5.0*   ALBUMIN 2.5*   < > 1.6*  1.6*   AST 94*   < > 101*   ALT 26   < > 26   GLC 97   < > 114*   WBC 24.3*   < > 25.1*   HGB 9.6*   < > 9.2*   *   < > 98      < > 290   INR 1.67*   < > 1.90*    < > = values in this interval not displayed.       Recent Labs   Lab  Test 07/06/20 2247 06/25/20 2000   COLOR Dark Yellow Orange   APPEARANCE Clear Slightly Cloudy   URINEGLC Negative Negative   URINEBILI Moderate* Large*   URINEKETONE Negative Negative   SG 1.019 1.016   UBLD Negative Trace*   URINEPH 5.5 5.5   PROTEIN 10* 30*   NITRITE Negative Negative   LEUKEST Negative Small*   RBCU <1 <1   WBCU 1 19*   UTPG  --  0.54*       I have reviewed the Medications, Allergies, Past Medical and Surgical History, and Social History in the [facility] system.  PAST MEDICAL HISTORY: History reviewed. No pertinent past medical history.    PAST SURGICAL HISTORY:   Past Surgical History:   Procedure Laterality Date     IR PARACENTESIS  6/26/2020       Past medical history, past surgical history, medications, and allergies were reviewed with the patient. Additional pertinent items: Alcoholic cirrhosis, ascites, alcohol abuse, gout, HTN, and tobacco abuse    FAMILY HISTORY: History reviewed. No pertinent family history.    SOCIAL HISTORY:   Social History     Tobacco Use     Smoking status: Current Every Day Smoker     Smokeless tobacco: Never Used   Substance Use Topics     Alcohol use: Not Currently     Social history was reviewed with the patient. Additional pertinent items: Alcohol and tobacco use      Discharge Medication List as of 7/6/2020 11:43 PM      START taking these medications    Details   oxyCODONE (ROXICODONE) 5 MG tablet Take 1 tablet (5 mg) by mouth every 6 hours as needed for pain, Disp-6 tablet,R-0, Local Print         CONTINUE these medications which have NOT CHANGED    Details   gabapentin (NEURONTIN) 300 MG capsule Take 1 capsule (300 mg) by mouth At Bedtime for 7 days, Disp-7 capsule,R-0, E-Prescribe      pantoprazole (PROTONIX) 20 MG EC tablet Take 40 mg by mouth daily, Historical              No Known Allergies     Review of Systems  A complete review of systems was performed with pertinent positives and negatives noted in the HPI, and all other systems negative.    Physical  "Exam   BP: 122/54  Pulse: 97  Temp: 98.3  F (36.8  C)  Resp: 18  Height: 180.3 cm (5' 11\")  Weight: 99.8 kg (220 lb)  SpO2: 100 %      Physical Exam  Vitals signs reviewed.   Constitutional:       General: He is not in acute distress.     Appearance: He is not ill-appearing, toxic-appearing or diaphoretic.      Comments: Comfortably resting, lying in bed, NAD, nondiaphoretic, lucid, fully conversant, no  respiratory distress, alert and oriented.     HENT:      Head: Normocephalic and atraumatic.   Eyes:      General: No scleral icterus.     Extraocular Movements: Extraocular movements intact.      Pupils: Pupils are equal, round, and reactive to light.   Neck:      Musculoskeletal: Normal range of motion.   Cardiovascular:      Rate and Rhythm: Normal rate.      Pulses: Normal pulses.   Pulmonary:      Effort: Pulmonary effort is normal. No respiratory distress.   Abdominal:      Comments: Ascites, nontender      Skin:     Capillary Refill: Capillary refill takes less than 2 seconds.      Coloration: Skin is jaundiced.         ED Course        [facility], [place]    -Paracentesis    Date/Time: 7/6/2020 11:56 PM  Performed by: Robb Greenwood MD  Authorized by: Robb Greenwood MD     UNIVERSAL PROTOCOL   Site Marked: NA  Prior Images Obtained and Reviewed:  NA  Required items: Required blood products, implants, devices and special equipment available    Patient identity confirmed:  Verbally with patient and arm band  Patient was reevaluated immediately before administering moderate or deep sedation or anesthesia  Confirmation Checklist:  Patient's identity using two indicators  Time out: Immediately prior to the procedure a time out was called    Universal Protocol: the Joint Commission Universal Protocol was followed    Preparation: Patient was prepped and draped in usual sterile fashion    ESBL (mL):  0        PRE-PROCEDURE DETAILS     Procedure purpose:  " Diagnostic    ANESTHESIA (see MAR for exact dosages):     Anesthesia method:  Local infiltration    Local anesthetic:  Lidocaine 1% w/o epi    PROCEDURE DETAILS     Needle gauge:  18    Ultrasound guidance: yes      Puncture site:  R lower quadrant    Fluid removed amount:  2,000    Fluid appearance:  Cloudy    Dressing: Skin glue.      Results for orders placed during the hospital encounter of 07/06/20   POC US ABDOMEN LIMITED    Impression Significant ascites                                Results for orders placed or performed during the hospital encounter of 07/06/20 (from the past 24 hour(s))   POC US ABDOMEN LIMITED    Impression    Significant ascites   CBC with platelets differential   Result Value Ref Range    WBC 24.3 (H) 4.0 - 11.0 10e9/L    RBC Count 2.92 (L) 4.4 - 5.9 10e12/L    Hemoglobin 9.6 (L) 13.3 - 17.7 g/dL    Hematocrit 29.4 (L) 40.0 - 53.0 %     (H) 78 - 100 fl    MCH 32.9 26.5 - 33.0 pg    MCHC 32.7 31.5 - 36.5 g/dL    RDW 16.6 (H) 10.0 - 15.0 %    Platelet Count 277 150 - 450 10e9/L    Diff Method Automated Method     % Neutrophils 86.2 %    % Lymphocytes 5.5 %    % Monocytes 5.2 %    % Eosinophils 1.8 %    % Basophils 0.5 %    % Immature Granulocytes 0.8 %    Nucleated RBCs 0 0 /100    Absolute Neutrophil 20.9 (H) 1.6 - 8.3 10e9/L    Absolute Lymphocytes 1.3 0.8 - 5.3 10e9/L    Absolute Monocytes 1.3 0.0 - 1.3 10e9/L    Absolute Eosinophils 0.4 0.0 - 0.7 10e9/L    Absolute Basophils 0.1 0.0 - 0.2 10e9/L    Abs Immature Granulocytes 0.2 0 - 0.4 10e9/L    Absolute Nucleated RBC 0.0    Comprehensive metabolic panel   Result Value Ref Range    Sodium 133 133 - 144 mmol/L    Potassium 3.5 3.4 - 5.3 mmol/L    Chloride 103 94 - 109 mmol/L    Carbon Dioxide 20 20 - 32 mmol/L    Anion Gap 10 3 - 14 mmol/L    Glucose 97 70 - 99 mg/dL    Urea Nitrogen 39 (H) 7 - 30 mg/dL    Creatinine 2.00 (H) 0.66 - 1.25 mg/dL    GFR Estimate 43 (L) >60 mL/min/[1.73_m2]    GFR Estimate If Black 49 (L) >60  mL/min/[1.73_m2]    Calcium 8.7 8.5 - 10.1 mg/dL    Bilirubin Total 15.7 (HH) 0.2 - 1.3 mg/dL    Albumin 2.5 (L) 3.4 - 5.0 g/dL    Protein Total 6.2 (L) 6.8 - 8.8 g/dL    Alkaline Phosphatase 418 (H) 40 - 150 U/L    ALT 26 0 - 70 U/L    AST 94 (H) 0 - 45 U/L   INR   Result Value Ref Range    INR 1.67 (H) 0.86 - 1.14   Cell count with differential fluid   Result Value Ref Range    Body Fluid Analysis Source Ascites     Color Fluid Yellow     Appearance Fluid Cloudy     WBC Fluid 291 /uL    % Neutrophils Fluid 30 %    % Lymphocytes Fluid 19 %    % Other Cells Fluid 51 %   Protein fluid   Result Value Ref Range    Protein Total Fluid Source Ascites     Protein Total Fluid 2.9 g/dL   Glucose fluid   Result Value Ref Range    Glucose Fluid Source Ascites     Glucose Fluid 104 mg/dL   Lactate dehydrogenase fluid   Result Value Ref Range    LD Fluid Source Ascites     Lactate Dehydrogenase Fluid 53 U/L   CT Abdomen Pelvis w/o Contrast    Narrative    EXAM: CT ABDOMEN PELVIS W/O CONTRAST  LOCATION: [facility]  DATE/TIME: 7/6/2020 9:53 PM    INDICATION: New liver failure with worsening right flank pain.  COMPARISON: None.  TECHNIQUE: CT scan of the abdomen and pelvis was performed without IV contrast. Multiplanar reformats were obtained. Dose reduction techniques were used.  CONTRAST: None.    FINDINGS: Lack of IV contrast degrades sensitivity to visceral and vascular pathology.  LOWER CHEST: Normal.    HEPATOBILIARY: Moderate to large volume ascites. Enlarged fatty liver. No intra or extrahepatic biliary dilatation.    PANCREAS: Normal.    SPLEEN: Mild splenomegaly.    ADRENAL GLANDS: Normal.    KIDNEYS/BLADDER: Normal.    BOWEL: Normal.    LYMPH NODES: Normal.    VASCULATURE: Unremarkable.    PELVIC ORGANS: Normal.    MUSCULOSKELETAL: Normal.      Impression    IMPRESSION:   1.  Hepatosplenomegaly with moderate to large volume ascites.  2.  Diffuse fatty infiltration of the liver.  3.  No obstructing renal or  ureteral stones.     UA with Microscopic   Result Value Ref Range    Color Urine Dark Yellow     Appearance Urine Clear     Glucose Urine Negative NEG^Negative mg/dL    Bilirubin Urine Moderate (A) NEG^Negative    Ketones Urine Negative NEG^Negative mg/dL    Specific Gravity Urine 1.019 1.003 - 1.035    Blood Urine Negative NEG^Negative    pH Urine 5.5 5.0 - 7.0 pH    Protein Albumin Urine 10 (A) NEG^Negative mg/dL    Urobilinogen mg/dL 4.0 (H) 0.0 - 2.0 mg/dL    Nitrite Urine Negative NEG^Negative    Leukocyte Esterase Urine Negative NEG^Negative    Source Urine     WBC Urine 1 0 - 5 /HPF    RBC Urine <1 0 - 2 /HPF    Squamous Epithelial /HPF Urine 1 0 - 1 /HPF    Transitional Epi <1 0 - 1 /HPF    Mucous Urine Present (A) NEG^Negative /LPF   Urine Culture    Specimen: Urine clean catch; Unspecified Urine   Result Value Ref Range    Specimen Description Unspecified Urine     Special Requests Specimen received in preservative     Culture Micro PENDING      Medications   oxyCODONE (ROXICODONE) tablet 5 mg (5 mg Oral Given 7/6/20 1907)   oxyCODONE (ROXICODONE) tablet 5 mg (5 mg Oral Given 7/6/20 2138)             Assessments & Plan (with Medical Decision Making)   This is a 34-year-old male coming into the emergency room with a significant past medical history of new onset alcoholic hepatitis with onset ascites and liver failure.  Patient had a recent admission with a very significant work-up.  He is now presenting to the emergency room with right-sided flank pain and feeling of increased distention from his ascites.  On physical exam he is in no obvious distress.  He has significant jaundice and scleral icterus.  Abdomen is distended ascites.  Bedside ultrasound confirms a significant amount ascites.  A paracentesis was done at the bedside with removal of 2L.  Ascitic fluid was analyzed with no signs of acute SBP.  Patient has a chronic leukocytosis.  His renal function seems to be improving.  CT was also performed  which was not significant to find the cause of the right flank pain.  At this time I believe he can be safely discharged with close follow-up by hepatology.  On reassessment the patient is resting more comfortably in bed.  Will be discharged with a small dose of Roxicodone.  He will follow-up with hepatology as soon as possible.    Pt was discharged home/self-care.  PT was provided written discharge instructions. Additionally verbal instructions were given and discussed with patient.  PT was asked to return to the ED immediately for any new or concerning symptoms.  Pt was in agreement, endorsed understanding, and questions were answered.       I have reviewed the nursing notes.    I have reviewed the findings, diagnosis, plan and need for follow up with the patient.    Discharge Medication List as of 7/6/2020 11:43 PM      START taking these medications    Details   oxyCODONE (ROXICODONE) 5 MG tablet Take 1 tablet (5 mg) by mouth every 6 hours as needed for pain, Disp-6 tablet,R-0, Local Print             Final diagnoses:   Flank pain   Subacute liver failure without hepatic coma     Derek HOLLAND, am serving as a trained medical scribe to document services personally performed by Robb Greenwood MD, based on the provider's statements to me.   Robb HOLLAND MD, was physically present and have reviewed and verified the accuracy of this note documented by Derek Cooley.    7/6/2020   [facility], [place], EMERGENCY DEPARTMENT     Robb Greenwood MD  07/07/20 0000

## 2020-07-06 NOTE — ED TRIAGE NOTES
Pt arrives to triage with complaints of abdominal bloating and pain. Pt has hx liver and kidney issues. Pt reports he was here last week and needed a paracentesis. Reports SOB due to fluid build up in stomach. A&O, VSS

## 2020-07-07 LAB
BACTERIA SPEC CULT: NO GROWTH
GRAM STN SPEC: NORMAL
Lab: NORMAL
SPECIMEN SOURCE: NORMAL
SPECIMEN SOURCE: NORMAL

## 2020-07-07 NOTE — DISCHARGE INSTRUCTIONS
Please make an appointment to follow up with GI Clinic (phone: [phone]) as soon as possible.    Results for orders placed or performed during the hospital encounter of 07/06/20   CT Abdomen Pelvis w/o Contrast     Status: None    Narrative    EXAM: CT ABDOMEN PELVIS W/O CONTRAST  LOCATION: [facility]  DATE/TIME: 7/6/2020 9:53 PM    INDICATION: New liver failure with worsening right flank pain.  COMPARISON: None.  TECHNIQUE: CT scan of the abdomen and pelvis was performed without IV contrast. Multiplanar reformats were obtained. Dose reduction techniques were used.  CONTRAST: None.    FINDINGS: Lack of IV contrast degrades sensitivity to visceral and vascular pathology.  LOWER CHEST: Normal.    HEPATOBILIARY: Moderate to large volume ascites. Enlarged fatty liver. No intra or extrahepatic biliary dilatation.    PANCREAS: Normal.    SPLEEN: Mild splenomegaly.    ADRENAL GLANDS: Normal.    KIDNEYS/BLADDER: Normal.    BOWEL: Normal.    LYMPH NODES: Normal.    VASCULATURE: Unremarkable.    PELVIC ORGANS: Normal.    MUSCULOSKELETAL: Normal.      Impression    IMPRESSION:   1.  Hepatosplenomegaly with moderate to large volume ascites.  2.  Diffuse fatty infiltration of the liver.  3.  No obstructing renal or ureteral stones.     CBC with platelets differential     Status: Abnormal   Result Value Ref Range    WBC 24.3 (H) 4.0 - 11.0 10e9/L    RBC Count 2.92 (L) 4.4 - 5.9 10e12/L    Hemoglobin 9.6 (L) 13.3 - 17.7 g/dL    Hematocrit 29.4 (L) 40.0 - 53.0 %     (H) 78 - 100 fl    MCH 32.9 26.5 - 33.0 pg    MCHC 32.7 31.5 - 36.5 g/dL    RDW 16.6 (H) 10.0 - 15.0 %    Platelet Count 277 150 - 450 10e9/L    Diff Method Automated Method     % Neutrophils 86.2 %    % Lymphocytes 5.5 %    % Monocytes 5.2 %    % Eosinophils 1.8 %    % Basophils 0.5 %    % Immature Granulocytes 0.8 %    Nucleated RBCs 0 0 /100    Absolute Neutrophil 20.9 (H) 1.6 - 8.3 10e9/L    Absolute Lymphocytes 1.3 0.8 - 5.3 10e9/L     Absolute Monocytes 1.3 0.0 - 1.3 10e9/L    Absolute Eosinophils 0.4 0.0 - 0.7 10e9/L    Absolute Basophils 0.1 0.0 - 0.2 10e9/L    Abs Immature Granulocytes 0.2 0 - 0.4 10e9/L    Absolute Nucleated RBC 0.0    Comprehensive metabolic panel     Status: Abnormal   Result Value Ref Range    Sodium 133 133 - 144 mmol/L    Potassium 3.5 3.4 - 5.3 mmol/L    Chloride 103 94 - 109 mmol/L    Carbon Dioxide 20 20 - 32 mmol/L    Anion Gap 10 3 - 14 mmol/L    Glucose 97 70 - 99 mg/dL    Urea Nitrogen 39 (H) 7 - 30 mg/dL    Creatinine 2.00 (H) 0.66 - 1.25 mg/dL    GFR Estimate 43 (L) >60 mL/min/[1.73_m2]    GFR Estimate If Black 49 (L) >60 mL/min/[1.73_m2]    Calcium 8.7 8.5 - 10.1 mg/dL    Bilirubin Total 15.7 (HH) 0.2 - 1.3 mg/dL    Albumin 2.5 (L) 3.4 - 5.0 g/dL    Protein Total 6.2 (L) 6.8 - 8.8 g/dL    Alkaline Phosphatase 418 (H) 40 - 150 U/L    ALT 26 0 - 70 U/L    AST 94 (H) 0 - 45 U/L   INR     Status: Abnormal   Result Value Ref Range    INR 1.67 (H) 0.86 - 1.14   UA with Microscopic     Status: Abnormal   Result Value Ref Range    Color Urine Dark Yellow     Appearance Urine Clear     Glucose Urine Negative NEG^Negative mg/dL    Bilirubin Urine Moderate (A) NEG^Negative    Ketones Urine Negative NEG^Negative mg/dL    Specific Gravity Urine 1.019 1.003 - 1.035    Blood Urine Negative NEG^Negative    pH Urine 5.5 5.0 - 7.0 pH    Protein Albumin Urine 10 (A) NEG^Negative mg/dL    Urobilinogen mg/dL 4.0 (H) 0.0 - 2.0 mg/dL    Nitrite Urine Negative NEG^Negative    Leukocyte Esterase Urine Negative NEG^Negative    Source Urine     WBC Urine 1 0 - 5 /HPF    RBC Urine <1 0 - 2 /HPF    Squamous Epithelial /HPF Urine 1 0 - 1 /HPF    Transitional Epi <1 0 - 1 /HPF    Mucous Urine Present (A) NEG^Negative /LPF   Cell count with differential fluid     Status: None   Result Value Ref Range    Body Fluid Analysis Source Ascites     Color Fluid Yellow     Appearance Fluid Cloudy     WBC Fluid 291 /uL    % Neutrophils Fluid 30 %    %  Lymphocytes Fluid 19 %    % Other Cells Fluid 51 %   Protein fluid     Status: None   Result Value Ref Range    Protein Total Fluid Source Ascites     Protein Total Fluid 2.9 g/dL   Glucose fluid     Status: None   Result Value Ref Range    Glucose Fluid Source Ascites     Glucose Fluid 104 mg/dL   Lactate dehydrogenase fluid     Status: None   Result Value Ref Range    LD Fluid Source Ascites     Lactate Dehydrogenase Fluid 53 U/L

## 2020-07-07 NOTE — RESULT ENCOUNTER NOTE
Emergency Dept discharge antibiotic prescribed: None  No changes in treatment per ED lab result protocol.

## 2020-07-08 NOTE — RESULT ENCOUNTER NOTE
[place] ED discharge antibiotic (if prescribed):  None   No changes in treatment per General culture protocol.

## 2020-07-08 NOTE — RESULT ENCOUNTER NOTE
Final urine culture report is NEGATIVE per [place] ED Lab Result protocol.    If NEGATIVE result, no change in treatment, per [place] ED Lab Result protocol.

## 2020-07-12 LAB
BACTERIA SPEC CULT: NO GROWTH
SPECIMEN SOURCE: NORMAL

## 2020-07-13 LAB
BACTERIA SPEC CULT: NORMAL
Lab: NORMAL
SPECIMEN SOURCE: NORMAL

## 2020-07-13 NOTE — RESULT ENCOUNTER NOTE
Final Anaerobic bacterial culture report is NEGATIVE.    No treatment or change in treatment per [place] ED Lab Result protocol.

## 2020-07-14 ENCOUNTER — NURSE TRIAGE (OUTPATIENT)
Dept: NURSING | Facility: CLINIC | Age: 35
End: 2020-07-14

## 2020-07-14 NOTE — TELEPHONE ENCOUNTER
Makenna     [phone]       Call from pt's ester       CC:  Fluid retension      Was seen and treated in the ED  recently  - was referred out to GI specialist for initial care - it's a tele visit but he appears to need paracentesis again - retaining fluid in belly       The GI specialist unable to schedule him for that at this time       She is wondering where to go ?   Has been done in the ED in the past      Not have usual provider to coordinate care through         A/P:   > If has been done in ED before, would be reasonable to return there for eval / care for this         They will head to ED      Jacinto Camp RN                 COVID 19 Nurse Triage Plan/Patient Instructions    Please be aware that novel coronavirus (COVID-19) may be circulating in the community. If you develop symptoms such as fever, cough, or SOB or if you have concerns about the presence of another infection including coronavirus (COVID-19), please contact your health care provider or visit [email or web address].     Disposition/Instructions    ED Visit recommended. Follow protocol based instructions.     Bring Your Own Device:  Please also bring your smart device(s) (smart phones, tablets, laptops) and their charging cables for your personal use and to communicate with your care team during your visit.    Thank you for taking steps to prevent the spread of this virus.  o Limit your contact with others.  o Wear a simple mask to cover your cough.  o Wash your hands well and often.    Resources    [facility]: About COVID-19: [email or web address]    CDC: What to Do If You're Sick: www.cdc.gov/coronavirus/2019-ncov/about/steps-when-sick.html    CDC: Ending Home Isolation: www.cdc.gov/coronavirus/2019-ncov/hcp/disposition-in-home-patients.html     CDC: Caring for Someone: www.cdc.gov/coronavirus/2019-ncov/if-you-are-sick/care-for-someone.html     IFEANYI: Interim Guidance for Hospital Discharge to Home:  www.health.[facility].mn.us/diseases/coronavirus/hcp/hospdischarge.pdf    [facility] clinical trials (COVID-19 research studies): [email or web address].[email or web address]/n-clinical-trials     Below are the COVID-19 hotlines at the [facility] of Health ([facility]). Interpreters are available.   o For health questions: Call 154-226-8472 or 1-778.296.1054 (7 a.m. to 7 p.m.)  o For questions about schools and childcare: Call 391-262-9149 or 1-712.691.3259 (7 a.m. to 7 p.m.)                                       Reason for Disposition    Nursing judgment or information in reference    Protocols used: NO GUIDELINE [id number]

## 2020-07-15 ENCOUNTER — NURSE TRIAGE (OUTPATIENT)
Dept: NURSING | Facility: CLINIC | Age: 35
End: 2020-07-15

## 2020-07-15 ENCOUNTER — TELEPHONE (OUTPATIENT)
Dept: GASTROENTEROLOGY | Facility: CLINIC | Age: 35
End: 2020-07-15

## 2020-07-15 DIAGNOSIS — K70.11 ALCOHOLIC HEPATITIS WITH ASCITES (H): Primary | ICD-10-CM

## 2020-07-15 RX ORDER — LIDOCAINE HYDROCHLORIDE 10 MG/ML
20 INJECTION, SOLUTION EPIDURAL; INFILTRATION; INTRACAUDAL; PERINEURAL ONCE
Status: CANCELLED | OUTPATIENT
Start: 2020-07-15

## 2020-07-15 RX ORDER — ALBUMIN (HUMAN) 12.5 G/50ML
12.5 SOLUTION INTRAVENOUS
Status: CANCELLED | OUTPATIENT
Start: 2020-07-15

## 2020-07-15 RX ORDER — HEPARIN SODIUM,PORCINE 10 UNIT/ML
5 VIAL (ML) INTRAVENOUS
Status: CANCELLED | OUTPATIENT
Start: 2020-07-15

## 2020-07-15 RX ORDER — HEPARIN SODIUM (PORCINE) LOCK FLUSH IV SOLN 100 UNIT/ML 100 UNIT/ML
5 SOLUTION INTRAVENOUS
Status: CANCELLED | OUTPATIENT
Start: 2020-07-15

## 2020-07-15 NOTE — TELEPHONE ENCOUNTER
Girlfrienminh Mensah is calling and states that Ian needs to have a paracentesis and is wondering where to go.  Ian is having severe abdominal pain.    COVID 19 Nurse Triage Plan/Patient Instructions    Please be aware that novel coronavirus (COVID-19) may be circulating in the community. If you develop symptoms such as fever, cough, or SOB or if you have concerns about the presence of another infection including coronavirus (COVID-19), please contact your health care provider or visit [email or web address].     Disposition/Instructions    ED Visit recommended. Follow protocol based instructions.     Bring Your Own Device:  Please also bring your smart device(s) (smart phones, tablets, laptops) and their charging cables for your personal use and to communicate with your care team during your visit.    Thank you for taking steps to prevent the spread of this virus.  o Limit your contact with others.  o Wear a simple mask to cover your cough.  o Wash your hands well and often.    Resources    [facility]: About COVID-19: [email or web address]    CDC: What to Do If You're Sick: www.cdc.gov/coronavirus/2019-ncov/about/steps-when-sick.html    CDC: Ending Home Isolation: www.cdc.gov/coronavirus/2019-ncov/hcp/disposition-in-home-patients.html     CDC: Caring for Someone: www.cdc.gov/coronavirus/2019-ncov/if-you-are-sick/care-for-someone.html     [facility]: Interim Guidance for Hospital Discharge to Home: www.health.[facility].mn.us/diseases/coronavirus/hcp/hospdischarge.pdf    [facility] clinical trials (COVID-19 research studies): clinicalaffairs.[facility]/n-clinical-trials     Below are the COVID-19 hotlines at the [facility] ([facility]). Interpreters are available.   o For health questions: Call 568-231-7654 or 1-209.110.9208 (7 a.m. to 7 p.m.)  o For questions about schools and childcare: Call 421-966-3900 or [phone] (7 a.m. to 7 p.m.)                       Reason for  "Disposition    [1] SEVERE pain (e.g., excruciating) AND [2] present > 1 hour    Additional Information    Negative: Shock suspected (e.g., cold/pale/clammy skin, too weak to stand, low BP, rapid pulse)    Negative: Difficult to awaken or acting confused (e.g., disoriented, slurred speech)    Negative: Passed out (i.e., lost consciousness, collapsed and was not responding)    Negative: Sounds like a life-threatening emergency to the triager    Negative: Chest pain    Negative: Pain is mainly in upper abdomen  (if needed ask: \"is it mainly above the belly button?\")    Negative: Followed an abdomen (stomach) injury    Protocols used: ABDOMINAL PAIN - MALE-A-AH      "

## 2020-07-16 NOTE — TELEPHONE ENCOUNTER
RECORDS RECEIVED FROM: Self - Cirrhosis   Appt Date: 07.20.2020   NOTES STATUS DETAILS   OFFICE NOTE from referring provider N/A    OFFICE NOTES from other specialists N/A    DISCHARGE SUMMARY from hospital Internal    Care Everywhere 07.06.2020 06.25.2020 06.24.2020 [facility]  06.11.2020 [facility]   MEDICATION LIST Internal    LIVER BIOSPY (IF APPLICABLE)      PATHOLOGY REPORTS  N/A    IMAGING     ENDOSCOPY (IF AVAILABLE) Care Everywhere 06.12.2020   COLONOSCOPY (IF AVAILABLE) N/A    ULTRASOUND LIVER Internal 06.30.2020 US ABDOMEN COMPLETE WITH DOPPLER COMPLETE    06.25.2020 US Abdomen Complete with Doppler   CT OF ABDOMEN Internal 07.06.2020 CT ABDOMEN PELVIS    MRI OF LIVER N/A    FIBROSCAN, US ELASTOGRAPHY, FIBROSIS SCAN, MR ELASTOGRAPHY N/A    LABS     HEPATIC PANEL (LIVER PANEL) Care Everywhere 06.14.2020   BASIC METABOLIC PANEL Care Everywhere 06.24.2020   COMPLETE METABOLIC PANEL Internal 07.06.2020   COMPLETE BLOOD COUNT (CBC) Internal 07.06.2020   INTERNATIONAL NORMALIZED RATIO (INR) Internal 07.06.2020   HEPATITIS C ANTIBODY Care Everywhere 06.12.2020   HEPATITIS C VIRAL LOAD/PCR N/A    HEPATITIS C GENOTYPE N/A    HEPATITIS B SURFACE ANTIGEN Care Everywhere 06.12.2020   HEPATITIS B SURFACE ANTIBODY Care Everywhere 06.12.2020   HEPATITIS B DNA QUANT LEVEL N/A    HEPATITIS B CORE ANTIBODY Care Everywhere 06.12.2020

## 2020-07-17 ENCOUNTER — RECORDS - HEALTHEAST (OUTPATIENT)
Dept: ADMINISTRATIVE | Facility: OTHER | Age: 35
End: 2020-07-17

## 2020-07-17 ENCOUNTER — MEDICAL CORRESPONDENCE (OUTPATIENT)
Dept: HEALTH INFORMATION MANAGEMENT | Facility: CLINIC | Age: 35
End: 2020-07-17

## 2020-07-20 ENCOUNTER — PRE VISIT (OUTPATIENT)
Dept: GASTROENTEROLOGY | Facility: CLINIC | Age: 35
End: 2020-07-20

## 2020-07-22 ENCOUNTER — OFFICE VISIT - HEALTHEAST (OUTPATIENT)
Dept: FAMILY MEDICINE | Facility: CLINIC | Age: 35
End: 2020-07-22

## 2020-07-22 DIAGNOSIS — K70.31 ALCOHOLIC CIRRHOSIS OF LIVER WITH ASCITES (H): ICD-10-CM

## 2020-07-22 DIAGNOSIS — K72.00 SUBACUTE LIVER FAILURE WITHOUT HEPATIC COMA: ICD-10-CM

## 2020-07-22 DIAGNOSIS — I10 BENIGN ESSENTIAL HYPERTENSION: ICD-10-CM

## 2020-07-22 DIAGNOSIS — K20.90 ESOPHAGITIS: ICD-10-CM

## 2020-07-22 DIAGNOSIS — K76.6 PORTAL HYPERTENSION (H): ICD-10-CM

## 2020-07-22 ASSESSMENT — MIFFLIN-ST. JEOR: SCORE: 1974.12

## 2020-07-23 ENCOUNTER — COMMUNICATION - HEALTHEAST (OUTPATIENT)
Dept: LAB | Facility: CLINIC | Age: 35
End: 2020-07-23

## 2020-07-27 ENCOUNTER — PRE VISIT (OUTPATIENT)
Dept: GASTROENTEROLOGY | Facility: CLINIC | Age: 35
End: 2020-07-27

## 2020-07-29 NOTE — PROGRESS NOTES
Hepatology CLINIC VISIT    CC/REFERRING MD:  Post discharge follow up/establish care  REASON FOR CONSULTATION: Alcoholic hepatitis    ASSESSMENT/PLAN:  Ian Crowley is a 35 year old male with PMHx of alcoholic hepatitis c/b ascites, alcohol use disorder, tobacco use, HTN, and gout who presents to establish care.     #. Alcoholic Hepatitis c/b ascites  The patient initially presented to an OSH with conjugated hyperbilirubinemia in June 2020 in the setting of alcohol overuse, likely secondary to alcohol related hepatitis. Abdominal US w/doppler without evidence of thrombosis, but coarse echotexture c/f fibrosis. He then re-presented later in June with abdominal pain and was found to have an GERTRUDIS (SCr up to 5.1) and hyponatremia along w/persistent hyperbilirubinemia -> transferred to [facility] for further care. His SCr down trended to 2.0 on discharge.  MELD-Na: 24 (based on 7/17/2020 labs)  - Once off alcohol for 6 months and inflammation from alcoholic hepatitis has improved, will consider fibrosis assessment    #. Ascites: SAAG > 1.1 on repeat paracentesis at [facility] on 6/26, no evidence of SBP. Total protein 2.8 (peritoneal fluid). No history of SBP.  Currently requiring a paracentesis every 8-9 days via [facility] ED. Was recently placed on 20mg lasix qdaily with minimal change in fluid build up. Last paracentesis 7/25/20: 5L removed  - Increased lasix to 40mg qdaily and started spironolactone 100mg qdaily for diuresis   * Plan for BMP in 14 days after diuretic adjustment to assess SCr and electrolytes  - Encouraged patient to weight himself daily   - PRN paracentesis (if > 5L removed, IV albumin 6-8g/L replacement is need), will place order to see if this can be done at [facility] instead of [facility] ED  - Counseled on 2g Na diet    #. Hepatic encephalopathy: No prior episodes  #. Esophageal Varices: EGD 6/2020 without e/o esophageal varices  #. HCC: No evidence of HCC on 7/2020 CT abdomen  #. Transplantation: Not a  LTx candidate at this time given recent alcohol use, lack of insight regarding and no prior AUD treatment.     #. Alcohol use disorder: Ongoing alcohol use until 6/2020. No prior treatment. No history of DUIs. Family history of AUD. Was discharged on gabapentin in June 2020, but ran out and no longer has cravings.   - Patient in the pre-contemplative stage of needing AUD treatment, he wants to focus on his ascites management prior to AUD treatment initiation.     #. H Pylori: Diagnosed 6/2020 via EGD s/p treatment (amox, clarithromycin, PPI).   - Given esophagitis noted on 6/2020 EGD, plan for at least 8 weeks of PPI then taper off  - At next visit, plan to taper off PPI and once off for 2 weeks, plan for stool H Pylori (9/2020) to confirm eradication    #. Hematochezia: Intermittent, denies lightheadedness or dizziness. Per patient this has been going on for years.   - Plan for colonoscopy for further assessment    Health Care Maintenance:  Vaccinations/Immunity:     - Hepatitis A: Not immune, recommend vaccination per PCP  - Hepatitis B: Not immune, recommend vaccination per PCP    Primary Care:  - Patient is currently without a PCP, encouraged patient to establish care with a PCP close to his home.     Return to clinic: 3 months    Discussed with hepatology attending, Dr. Leventhal who agrees with the above assessment and plan.    Yolande Mota MD  Gastroenterology and Hepatology Fellow  Pager: [phone]      HPI:   Ian Crowley is a 35 year old male with PMHx of alcoholic hepatitis c/b ascites, alcohol use disorder, tobacco use, HTN, and gout who presents to establish care.     - Since recent [facility] discharge, he was admitted to [facility] from 7/16-7/17 with abdominal pain, s/p paracentesis removed 6L, no e/o SBP. Started on lasix 20mg qdaily.   - Has a scale at home, but doesn't weight himself. HE notices that he has to get a paracentesis every 8-9 days, they usually take off 4-6L. He denies  any lower extremity swelling or shortness of breath. He has been going to the [facility] ED for his visits.  - Denies alcohol since 2020, is smoking less down to 2 packs per week.   - He has been having intermittent hematochezia for years, denies lightheadeness or dizziness. No prior colonoscopy. Denies any hematemesis or other bleeding.   - He has not been taking oxycodone because he wasn't param to fill it.   - Intermittent nausea, no emesis. Has been smoking marijuana for appetite and mood control.   - Post hospitalization his stamina is less and he feels week    PERTINENT PAST MEDICAL HISTORY:  - Alcoholic hepatitis  - Ascites  - Alcohol use disorder  - HTN  - Gout  - Tobacco use    PREVIOUS SURGERIES:  - No prior surgeries    ROS:  10 point ROS was conducted. Pertinent positives and negatives as above.    ALLERGIES:   No Known Allergies    PERTINENT MEDICATIONS:  - Pantoprazole 40mg qdaily    - Lasix 20mg qdaily    SOCIAL HISTORY:  - Single, lives alone  - Drinking for 10+ years, drinks 10 shots (vodka + chaser) nightly. No history of DUI. Last drink 2020.  - Tobacco use: 2 packs per week  - Marijuana: intermittently  - Previously a  and ; currently unemployed  - 2 sisters - one sister lives w/mother, he doesn't speak to the other sister  - Has a young daughter (currently 8 years old)    FAMILY HISTORY:  - Father: AUD  - Uncle: Alcohol related cirrhosis,   - No family history of liver disease or cancer    PHYSICAL EXAMINATION:  No vitals or physical exam performed given tele-visit.     PERTINENT STUDIES:  BMP  Recent Labs   Lab Test 20  1729 20  0649 20  0820  0753    131* 131* 129*   POTASSIUM 3.5 3.6 3.8 3.7   CHLORIDE 103 101 100 98   NAUN 8.7 9.0 8.9 8.8   CO2 20 20 22 20   BUN 39* 41* 38* 42*   CR 2.00* 2.00* 1.96* 2.21*   GLC 97 117* 100* 124*     CBC  Recent Labs   Lab Test 20  1729 20  0649 20  0828 20  0753   WBC  24.3* 17.6* 18.3* 19.4*   RBC 2.92* 2.61* 2.55* 2.50*   HGB 9.6* 8.8* 8.6* 8.5*   HCT 29.4* 26.6* 26.2* 25.5*   * 102* 103* 102*   MCH 32.9 33.7* 33.7* 34.0*   MCHC 32.7 33.1 32.8 33.3   RDW 16.6* 18.1* 18.2* 18.1*    238 221 220     Liver Function Studies -  Recent Labs   Lab Test 07/06/20  1729   PROTTOTAL 6.2*   ALBUMIN 2.5*   BILITOTAL 15.7*   ALKPHOS 418*   AST 94*   ALT 26     7/17/20 Labs:  WBC 14.4, Hgb 8.1, plt 305  Na 132, SCr 1.29  Tb 4.3, AST 47, ALT 15, alk phos 291  INR 1.7    ENDOSCOPY:  EGD 6/12/2020:  Impression:          - Normal upper third of esophagus and middle third of                        esophagus.                       - LA Grade A reflux esophagitis.                       - Z-line regular.                       - Small hiatal hernia.                       - Diffuse moderately erythematous mucosa with few                        scattered small erosions was found in the gastric                        body and in the gastric antrum. Biopsies were taken                        with a cold forceps for Helicobacter pylori testing.                       - Portal hypertensive gastropathy.                       - Normal examined duodenum.                       - No esophageal or gastric varices                       - No old blood or current active bleeding noted  Recommendation:      - Await pathology results, treat and confirm                        eradication if H pylori positive                       - Avoid aspirin/NSAIDs                       - IV b.i.d. PPI while in house, p.o. once daily PPI                        on discharge                       - Reflux precautions                       - ETOH cessation                       - Other recommendations per GI consult note                       - GI will continue to follow    IMAGING/STUDIES:   Abdominal US 6/2020 w/doppler  1.  Hepatomegaly and cirrhosis without focal liver mass.  2.  Sequela of portal hypertension including  splenomegaly, small  volume of ascites, retrograde flow in the main portal vein and right  portal vein, and recanalized paraumbilical vein. Patent Doppler  evaluation.  3.  Biliary sludge.    MRI Abdomen w/ and w/o contrast 6/2020:  1.  Enlarged, fatty liver. Smooth surface contour. No hepatic mass.  2.  Heterogeneous hepatic enhancement and mild periportal edema suggestive of acute hepatitis.  3.  Mild splenomegaly.  4.  Small volume ascites.  5.  Indeterminate T11 vertebral body lesion, likely benign.

## 2020-07-30 ENCOUNTER — VIRTUAL VISIT (OUTPATIENT)
Dept: GASTROENTEROLOGY | Facility: CLINIC | Age: 35
End: 2020-07-30
Attending: INTERNAL MEDICINE
Payer: MEDICAID

## 2020-07-30 ENCOUNTER — TELEPHONE (OUTPATIENT)
Dept: GASTROENTEROLOGY | Facility: CLINIC | Age: 35
End: 2020-07-30

## 2020-07-30 ENCOUNTER — RECORDS - HEALTHEAST (OUTPATIENT)
Dept: ADMINISTRATIVE | Facility: OTHER | Age: 35
End: 2020-07-30

## 2020-07-30 DIAGNOSIS — Z00.00 HEALTH CARE MAINTENANCE: ICD-10-CM

## 2020-07-30 DIAGNOSIS — A04.8 H. PYLORI INFECTION: ICD-10-CM

## 2020-07-30 DIAGNOSIS — F10.21 ALCOHOL USE DISORDER, SEVERE, IN EARLY REMISSION (H): ICD-10-CM

## 2020-07-30 DIAGNOSIS — K70.31 ALCOHOLIC CIRRHOSIS OF LIVER WITH ASCITES (H): ICD-10-CM

## 2020-07-30 DIAGNOSIS — K70.11 ALCOHOLIC HEPATITIS WITH ASCITES (H): Primary | ICD-10-CM

## 2020-07-30 DIAGNOSIS — K92.1 HEMATOCHEZIA: ICD-10-CM

## 2020-07-30 RX ORDER — FUROSEMIDE 40 MG
40 TABLET ORAL DAILY
Qty: 90 TABLET | Refills: 3 | Status: SHIPPED | OUTPATIENT
Start: 2020-07-30 | End: 2024-05-30

## 2020-07-30 RX ORDER — SPIRONOLACTONE 100 MG/1
100 TABLET, FILM COATED ORAL DAILY
Qty: 90 TABLET | Refills: 3 | Status: SHIPPED | OUTPATIENT
Start: 2020-07-30 | End: 2024-05-30

## 2020-07-30 NOTE — PROGRESS NOTES
"Ian Crowley is a 35 year old male who is being evaluated via a billable telephone visit.      The patient has been notified of following:     \"This telephone visit will be conducted via a call between you and your physician/provider. We have found that certain health care needs can be provided without the need for a physical exam.  This service lets us provide the care you need with a short phone conversation.  If a prescription is necessary we can send it directly to your pharmacy.  If lab work is needed we can place an order for that and you can then stop by our lab to have the test done at a later time.    Telephone visits are billed at different rates depending on your insurance coverage. During this emergency period, for some insurers they may be billed the same as an in-person visit.  Please reach out to your insurance provider with any questions.    If during the course of the call the physician/provider feels a telephone visit is not appropriate, you will not be charged for this service.\"    Patient has given verbal consent for Telephone visit?  Yes    What phone number would you like to be contacted at? Per EPIC    How would you like to obtain your AVS? Mail a copy    Phone call duration: 40 minutes    Staff Physician Attestation  I, Thomas M. Leventhal, M.D., discussed and examined this patient with the fellow and agree with the fellow s findings and plan of care as documented in the fellow s note.  I personally reviewed vital signs, medications, labs and imaging.    This is a 35-year-old male with past medical history of severe alcohol use disorder with recent admission in June 2020 with alcoholic hepatitis and seizure activity who is presenting for outpatient follow-up.    Since discharge in June he has presented for care for for volume overload requiring paracentesis for ascites and abdominal pain.    He reports that he has remained sober since his hospitalization, and is not actively " involved in a rehab or recovery program.  Prior to his hospitalization he was having at least 10 alcohol equivalents on a near daily basis.    Assessment and plan  -We will schedule paracentesis for him closer to home to avoid further need for ER visits  -We will make some changes to his diuretic regimen  -Encouraged on his sobriety  -Discussed with him the need to establish care with a primary care provider to help manage his myriad of medical issues    Thomas M. Leventhal, M.D.   of Medicine  Advanced & Transplant Hepatology  [facility]

## 2020-07-30 NOTE — PATIENT INSTRUCTIONS
- Plan for lasix/furosemide 40mg and spironolactone 100mg for fluid management  - Plan for labs in 14 days at [facility]  - Continue to weight yourself daily and work on a low sodium diet.   - Establish care with a primary care doctor as you need to get vaccinated against hepatitis A and B    Establish [facility] Access by [phone]    We will see you for a return appointment in 3 months.

## 2020-07-30 NOTE — LETTER
7/30/2020         RE: Ian Crowley  [address]  [place] [address]        Dear Colleague,    Thank you for referring your patient, Ian Crowley, to the [facility] HEPATOLOGY. Please see a copy of my visit note below.    Hepatology CLINIC VISIT    CC/REFERRING MD:  Post discharge follow up/establish care  REASON FOR CONSULTATION: Alcoholic hepatitis    ASSESSMENT/PLAN:  Ian Crowley is a 35 year old male with PMHx of alcoholic hepatitis c/b ascites, alcohol use disorder, tobacco use, HTN, and gout who presents to establish care.     #. Alcoholic Hepatitis c/b ascites  The patient initially presented to an OSH with conjugated hyperbilirubinemia in June 2020 in the setting of alcohol overuse, likely secondary to alcohol related hepatitis. Abdominal US w/doppler without evidence of thrombosis, but coarse echotexture c/f fibrosis. He then re-presented later in June with abdominal pain and was found to have an GERTRUDIS (SCr up to 5.1) and hyponatremia along w/persistent hyperbilirubinemia -> transferred to [facility] for further care. His SCr down trended to 2.0 on discharge.  MELD-Na: 24 (based on 7/17/2020 labs)  - Once off alcohol for 6 months and inflammation from alcoholic hepatitis has improved, will consider fibrosis assessment    #. Ascites: SAAG > 1.1 on repeat paracentesis at [facility] on 6/26, no evidence of SBP. Total protein 2.8 (peritoneal fluid). No history of SBP.  Currently requiring a paracentesis every 8-9 days via [facility]. Was recently placed on 20mg lasix qdaily with minimal change in fluid build up. Last paracentesis 7/25/20: 5L removed  - Increased lasix to 40mg qdaily and started spironolactone 100mg qdaily for diuresis   * Plan for BMP in 14 days after diuretic adjustment to assess SCr and electrolytes  - Encouraged patient to weight himself daily   - PRN paracentesis (if > 5L removed, IV albumin 6-8g/L replacement is need), will place order to see if this can  be done at [facility] ED  - Counseled on 2g Na diet    #. Hepatic encephalopathy: No prior episodes  #. Esophageal Varices: EGD 6/2020 without e/o esophageal varices  #. HCC: No evidence of HCC on 7/2020 CT abdomen  #. Transplantation: Not a LTx candidate at this time given recent alcohol use, lack of insight regarding and no prior AUD treatment.     #. Alcohol use disorder: Ongoing alcohol use until 6/2020. No prior treatment. No history of DUIs. Family history of AUD. Was discharged on gabapentin in June 2020, but ran out and no longer has cravings.   - Patient in the pre-contemplative stage of needing AUD treatment, he wants to focus on his ascites management prior to AUD treatment initiation.     #. H Pylori: Diagnosed 6/2020 via EGD s/p treatment (amox, clarithromycin, PPI).   - Given esophagitis noted on 6/2020 EGD, plan for at least 8 weeks of PPI then taper off  - At next visit, plan to taper off PPI and once off for 2 weeks, plan for stool H Pylori (9/2020) to confirm eradication    #. Hematochezia: Intermittent, denies lightheadedness or dizziness. Per patient this has been going on for years.   - Plan for colonoscopy for further assessment    Health Care Maintenance:  Vaccinations/Immunity:     - Hepatitis A: Not immune, recommend vaccination per PCP  - Hepatitis B: Not immune, recommend vaccination per PCP    Primary Care:  - Patient is currently without a PCP, encouraged patient to establish care with a PCP close to his home.     Return to clinic: 3 months    Discussed with hepatology attending, Dr. Leventhal who agrees with the above assessment and plan.    Yolande Mota MD  Gastroenterology and Hepatology Fellow  Pager: [phone]      HPI:   Ian Crowley is a 35 year old male with PMHx of alcoholic hepatitis c/b ascites, alcohol use disorder, tobacco use, HTN, and gout who presents to establish care.     - Since recent [facility] discharge, he was admitted to [facility]  from - with abdominal pain, s/p paracentesis removed 6L, no e/o SBP. Started on lasix 20mg qdaily.   - Has a scale at home, but doesn't weight himself. HE notices that he has to get a paracentesis every 8-9 days, they usually take off 4-6L. He denies any lower extremity swelling or shortness of breath. He has been going to the [facility] ED for his visits.  - Denies alcohol since 2020, is smoking less down to 2 packs per week.   - He has been having intermittent hematochezia for years, denies lightheadeness or dizziness. No prior colonoscopy. Denies any hematemesis or other bleeding.   - He has not been taking oxycodone because he wasn't param to fill it.   - Intermittent nausea, no emesis. Has been smoking marijuana for appetite and mood control.   - Post hospitalization his stamina is less and he feels week    PERTINENT PAST MEDICAL HISTORY:  - Alcoholic hepatitis  - Ascites  - Alcohol use disorder  - HTN  - Gout  - Tobacco use    PREVIOUS SURGERIES:  - No prior surgeries    ROS:  10 point ROS was conducted. Pertinent positives and negatives as above.    ALLERGIES:   No Known Allergies    PERTINENT MEDICATIONS:  - Pantoprazole 40mg qdaily    - Lasix 20mg qdaily    SOCIAL HISTORY:  - Single, lives alone  - Drinking for 10+ years, drinks 10 shots (vodka + chaser) nightly. No history of DUI. Last drink 2020.  - Tobacco use: 2 packs per week  - Marijuana: intermittently  - Previously a  and ; currently unemployed  - 2 sisters - one sister lives w/mother, he doesn't speak to the other sister  - Has a young daughter (currently 8 years old)    FAMILY HISTORY:  - Father: AUD  - Uncle: Alcohol related cirrhosis,   - No family history of liver disease or cancer    PHYSICAL EXAMINATION:  No vitals or physical exam performed given tele-visit.     PERTINENT STUDIES:  BMP  Recent Labs   Lab Test 20  1729 20  0649 20  0828 20  0753    131* 131* 129*    POTASSIUM 3.5 3.6 3.8 3.7   CHLORIDE 103 101 100 98   NAUN 8.7 9.0 8.9 8.8   CO2 20 20 22 20   BUN 39* 41* 38* 42*   CR 2.00* 2.00* 1.96* 2.21*   GLC 97 117* 100* 124*     CBC  Recent Labs   Lab Test 07/06/20  1729 06/30/20  0649 06/29/20  0828 06/28/20  0753   WBC 24.3* 17.6* 18.3* 19.4*   RBC 2.92* 2.61* 2.55* 2.50*   HGB 9.6* 8.8* 8.6* 8.5*   HCT 29.4* 26.6* 26.2* 25.5*   * 102* 103* 102*   MCH 32.9 33.7* 33.7* 34.0*   MCHC 32.7 33.1 32.8 33.3   RDW 16.6* 18.1* 18.2* 18.1*    238 221 220     Liver Function Studies -  Recent Labs   Lab Test 07/06/20  1729   PROTTOTAL 6.2*   ALBUMIN 2.5*   BILITOTAL 15.7*   ALKPHOS 418*   AST 94*   ALT 26     7/17/20 Labs:  WBC 14.4, Hgb 8.1, plt 305  Na 132, SCr 1.29  Tb 4.3, AST 47, ALT 15, alk phos 291  INR 1.7    ENDOSCOPY:  EGD 6/12/2020:  Impression:          - Normal upper third of esophagus and middle third of                        esophagus.                       - LA Grade A reflux esophagitis.                       - Z-line regular.                       - Small hiatal hernia.                       - Diffuse moderately erythematous mucosa with few                        scattered small erosions was found in the gastric                        body and in the gastric antrum. Biopsies were taken                        with a cold forceps for Helicobacter pylori testing.                       - Portal hypertensive gastropathy.                       - Normal examined duodenum.                       - No esophageal or gastric varices                       - No old blood or current active bleeding noted  Recommendation:      - Await pathology results, treat and confirm                        eradication if H pylori positive                       - Avoid aspirin/NSAIDs                       - IV b.i.d. PPI while in house, p.o. once daily PPI                        on discharge                       - Reflux precautions                       - ETOH cessation        "                - Other recommendations per GI consult note                       - GI will continue to follow    IMAGING/STUDIES:   Abdominal US 6/2020 w/doppler  1.  Hepatomegaly and cirrhosis without focal liver mass.  2.  Sequela of portal hypertension including splenomegaly, small  volume of ascites, retrograde flow in the main portal vein and right  portal vein, and recanalized paraumbilical vein. Patent Doppler  evaluation.  3.  Biliary sludge.    MRI Abdomen w/ and w/o contrast 6/2020:  1.  Enlarged, fatty liver. Smooth surface contour. No hepatic mass.  2.  Heterogeneous hepatic enhancement and mild periportal edema suggestive of acute hepatitis.  3.  Mild splenomegaly.  4.  Small volume ascites.  5.  Indeterminate T11 vertebral body lesion, likely benign.    Ian Crowley is a 35 year old male who is being evaluated via a billable telephone visit.      The patient has been notified of following:     \"This telephone visit will be conducted via a call between you and your physician/provider. We have found that certain health care needs can be provided without the need for a physical exam.  This service lets us provide the care you need with a short phone conversation.  If a prescription is necessary we can send it directly to your pharmacy.  If lab work is needed we can place an order for that and you can then stop by our lab to have the test done at a later time.    Telephone visits are billed at different rates depending on your insurance coverage. During this emergency period, for some insurers they may be billed the same as an in-person visit.  Please reach out to your insurance provider with any questions.    If during the course of the call the physician/provider feels a telephone visit is not appropriate, you will not be charged for this service.\"    Patient has given verbal consent for Telephone visit?  Yes    What phone number would you like to be contacted at? Per EPIC    How would you " like to obtain your AVS? Mail a copy    Phone call duration: 40 minutes    Staff Physician Attestation  I, Thomas M. Leventhal, M.D., discussed and examined this patient with the fellow and agree with the fellow s findings and plan of care as documented in the fellow s note.  I personally reviewed vital signs, medications, labs and imaging.    This is a 35-year-old male with past medical history of severe alcohol use disorder with recent admission in June 2020 with alcoholic hepatitis and seizure activity who is presenting for outpatient follow-up.    Since discharge in June he has presented for care for for volume overload requiring paracentesis for ascites and abdominal pain.    He reports that he has remained sober since his hospitalization, and is not actively involved in a rehab or recovery program.  Prior to his hospitalization he was having at least 10 alcohol equivalents on a near daily basis.    Assessment and plan  -We will schedule paracentesis for him closer to home to avoid further need for ER visits  -We will make some changes to his diuretic regimen  -Encouraged on his sobriety  -Discussed with him the need to establish care with a primary care provider to help manage his myriad of medical issues    Thomas M. Leventhal, M.D.   of Medicine  Advanced & Transplant Hepatology  [facility]        Again, thank you for allowing me to participate in the care of your patient.        Sincerely,        Yolande Mota MD

## 2020-07-30 NOTE — LETTER
Date:August 5, 2020      Patient was self referred, no letter generated. Do not send.        [facility] Health Information

## 2020-07-30 NOTE — TELEPHONE ENCOUNTER
Order entered and patient updated.    Paola Finch LPN  Hepatology Clinic    ----- Message from Yolande Mota MD sent at 7/30/2020 12:19 PM CDT -----  Regarding: Paracentesis  Dear Paola,    Would you mind setting up Mrs. Crowley up for PRN paracentesis at [facility] please?  - No limit  - if > 5L removed, IV albumin 6-8g/L replacement is need  - Orders should go under Dr. Leventhal please.    Thank you.    Best.  [place]ce[place]

## 2020-08-03 ENCOUNTER — AMBULATORY - HEALTHEAST (OUTPATIENT)
Dept: SCHEDULING | Facility: CLINIC | Age: 35
End: 2020-08-03

## 2020-08-03 DIAGNOSIS — K70.11 ALCOHOLIC HEPATITIS WITH ASCITES (H): ICD-10-CM

## 2020-08-08 ENCOUNTER — COMMUNICATION - HEALTHEAST (OUTPATIENT)
Dept: SCHEDULING | Facility: CLINIC | Age: 35
End: 2020-08-08

## 2020-08-12 ENCOUNTER — HOSPITAL ENCOUNTER (OUTPATIENT)
Dept: ULTRASOUND IMAGING | Facility: CLINIC | Age: 35
Discharge: HOME | End: 2020-08-12
Attending: INTERNAL MEDICINE | Admitting: RADIOLOGY

## 2020-08-12 ENCOUNTER — HOSPITAL ENCOUNTER (OUTPATIENT)
Dept: ADMINISTRATIVE | Facility: OTHER | Age: 35
Discharge: HOME | End: 2020-08-12

## 2020-08-12 ENCOUNTER — OFFICE VISIT (OUTPATIENT)
Dept: FAMILY MEDICINE | Facility: CLINIC | Age: 35
End: 2020-08-12

## 2020-08-12 VITALS
WEIGHT: 204.6 LBS | DIASTOLIC BLOOD PRESSURE: 80 MMHG | RESPIRATION RATE: 18 BRPM | SYSTOLIC BLOOD PRESSURE: 115 MMHG | HEART RATE: 102 BPM | TEMPERATURE: 98.1 F | OXYGEN SATURATION: 100 % | HEIGHT: 71 IN | BODY MASS INDEX: 28.64 KG/M2

## 2020-08-12 DIAGNOSIS — A04.8 H. PYLORI INFECTION: ICD-10-CM

## 2020-08-12 DIAGNOSIS — F10.21 ALCOHOL USE DISORDER, SEVERE, IN EARLY REMISSION (H): ICD-10-CM

## 2020-08-12 DIAGNOSIS — L73.2 HIDRADENITIS SUPPURATIVA: ICD-10-CM

## 2020-08-12 DIAGNOSIS — K70.11 ALCOHOLIC HEPATITIS WITH ASCITES (H): ICD-10-CM

## 2020-08-12 DIAGNOSIS — K70.31 ALCOHOLIC CIRRHOSIS OF LIVER WITH ASCITES (H): Primary | ICD-10-CM

## 2020-08-12 ASSESSMENT — MIFFLIN-ST. JEOR: SCORE: 1893.06

## 2020-08-12 NOTE — PROGRESS NOTES
"       SUBJECTIVE       Ian Crowley is a 35 year old  male with a PMH significant for:     Patient Active Problem List   Diagnosis     Alcoholic hepatitis with ascites     Alcohol use disorder, severe, in early remission (H)     H. pylori infection     Alcoholic cirrhosis of liver with ascites (H)     Hematochezia     Hidradenitis suppurativa     He presents to establish care. Per chart review, it was suggested that he establish care with a PCP, specifically for Hep A and Hep B immunizations, especially as he was found to be non immune in 6/2020. He is being followed by Hepatology due to alcoholic cirrhosis. Regarding his ascites, he was told to have PRN paracentesis, and it was noted that he required procedures q8-9 days. Patient reports needing paracenteses more frequent to this. Last one was 8/8/2020, and next one is scheduled for today.  Due to his paracentesis today, he declined getting immunized for hepatitis A and hepatitis B stating, \"I want to minimize the number of pokes today.\"    We talked about his medications, patient reported to take Lasix, Protonix, Neurontin.  He states he does not take the spironolactone because when he went to fill it, the prescription was not ready at the pharmacy, therefore he did not pick it up.  I counseled the patient on the importance of taking both Lasix and the spironolactone in order to best help him with the ascites.  Patient expressed understanding and willingness to fill the spironolactone prescription. Patient expressed some confusion about H. pylori infection, stating that he did not know he had this; however, reports to have finished his antibiotics.    Regarding concerns, he reports increased abdominal pain-he mentioned this to be occasional sharp pain that he rates at a 5 out of 10, and describes that he was supposed to have a course of oxycodone that he could not fill because he does not have a PCP.  He believes that this is related to the ascites, " "and this pain occasionally radiates into his lower back.  Dr. Travis talked at length with the patient regarding the inappropriate use of oxycodone to control his pain.  Specifically, she reported that [facility] does not prescribe narcotics without first having a tailored pain management regimen which consists of discussion of pain, medications, and finally an appointment with behavioral health.  Patient then reported that he cannot have any other pain regimens such as Tylenol secondary to his cirrhosis.  He was frustrated at not being able to have a prescription of oxycodone to help with his pain.  Patient stated \"I do not believe this patient-doctor relationship is going to work out\"  shortly after hearing Dr. Travis's explanation, and was not amenable to setting up pain plan.      PMH, Medications and Allergies were reviewed and updated as needed.        REVIEW OF SYSTEMS     CONSTITUTIONAL: NEGATIVE for fever, chills, change in weight  INTEGUMENTARY/SKIN: NEGATIVE for bruising, changing lesion , lumps or bumps, non-healing lesion and pigmentation  EYES: NEGATIVE for blurred vision bilateral  ENT/MOUTH: NEGATIVE for ear, mouth and throat problems  RESP: NEGATIVE for significant cough or SOB  CV: NEGATIVE for chest pain, palpitations or peripheral edema  GI: POSITIVE for abdominal pain generalized and NEGATIVE for constipation, diarrhea, dyspepsia, heartburn or reflux, hematemesis, hematochezia, melena, nausea and vomiting  MUSCULOSKELETAL: NEGATIVE for significant arthralgias or myalgia  NEURO: NEGATIVE for weakness, dizziness or paresthesias  ENDOCRINE: NEGATIVE for temperature intolerance, skin/hair changes  HEME/ALLERGY/IMMUNE: NEGATIVE for bleeding disorder, chills and fever  PSYCHIATRIC: NEGATIVE for changes in mood or affect        OBJECTIVE     Vitals:    08/12/20 1104   BP: 115/80   Pulse: 102   Resp: 18   Temp: 98.1  F (36.7  C)   TempSrc: Oral   SpO2: 100%   Weight: 92.8 kg (204 lb 9.6 oz) " "  Height: 1.8 m (5' 10.87\")     Body mass index is 28.64 kg/m .  Constitutional: NAD, well appearing  HEENT: Head: normocephalic, atraumatic. Eyes: EOMI, icterus bilterally. Neck: trachea midline, nonenlarged thryoid,   CV: RRR, normal S1 and S2, no rubs, gallops. No murmurs appreciated.  Lungs: non-labored breathing, able to complete full sentences, clear to auscultation bilaterally. No wheezes, rales, crackles noted.  Chest: non-tender to palpation of anterior chest  Abdomen: hepatosplenomegaly, distension due to ascites, positive fluid-wave motion, bowel sounds present in all 4 quadrants, generalized tenderness to palpation of all 4 quadrants. Surgical scars not present.  : deferred, no CVA tenderness  Skin: No ecchymosis, no open lesions, rash, mild jaundice  Musculoskeletal: Full ROM in all limbs, 5/5 strength in all limbs  Neuro: Alert and oriented x3, CN II-XII grossly  intact, sensation grossly intact  Psych: reserved affect, mood congruent. Well kept appearance. Non-tangential, no pressured speech. Answers questions appropriately.      No results found for any visits on 08/12/20.        ASSESSMENT AND PLAN     (K70.31) Alcoholic cirrhosis of liver with ascites (H)  (primary encounter diagnosis)  Pain management, previous prescriptions for oxycodone  Comment: MELD-Na score: 30 at 7/6/2020  5:29 PM  MELD score: 29 at 7/6/2020  5:29 PM  Calculated from:  Serum Creatinine: 2.00 mg/dL at 7/6/2020  5:29 PM  Serum Sodium: 133 mmol/L at 7/6/2020  5:29 PM  Total Bilirubin: 15.7 mg/dL at 7/6/2020  5:29 PM  INR(ratio): 1.67 at 7/6/2020  5:29 PM  Age: 34 years 11 months    Unfortunately, patient continues to demonstrate complications of alcoholic cirrhosis as evidenced by ascites, icterus, and recent labs. The fact that he now needs paracentesis closer than the 8-9 days previously reported is worrisome for worsening symptoms. We did not believe that short term courses of oxycodone was the best option to help manage " abdominal pain as it relates to ascites. We strongly encouraged him to fill and start taking spironolactone to help with the ascites, and discussed that his abdominal pain may be better relieved with having paracenteses scheduled closer together.  Plan:   - Patient to fill spironolactone prescription  - Hepatology following, appreciate recommendations  - Encouraged patient to establish care at another [facility] clinic to assess pain  - patient declined hepA and hepB vaccines today.    (F10.21) Alcohol use disorder, severe, in early remission (H)  Comment: Patient continues to avoid drinking.   Plan: continue gabapentin for cravings, continue to follow Hepatology recommendations.    (A04.8) H. pylori infection  Comment: Finished abx course, currently on protonix, followed by GI for this  Plan: Plan to follow up with GI for repeat testing once protonix finished.    (L73.2) Hidradenitis suppurativa  Comment: noted  Plan: continue to monitor          RTC as needed for follow up of ascites, health maintenance or sooner if develops new or worsening symptoms.    Natalie Starr MD PGY1

## 2020-08-12 NOTE — PROGRESS NOTES
"Preceptor attestation:  Vital signs reviewed: /80   Pulse 102   Temp 98.1  F (36.7  C) (Oral)   Resp 18   Ht 1.8 m (5' 10.87\")   Wt 92.8 kg (204 lb 9.6 oz)   SpO2 100%   BMI 28.64 kg/m      Patient seen, evaluated, and discussed with the resident.  I have verified the content of the note, which accurately reflects my assessment of the patient and the plan of care.    Supervising physician: Julissa Travis MD  [facility]  "

## 2020-08-12 NOTE — PATIENT INSTRUCTIONS
It was great seeing you in clinic today! We discussed general health maintenance, and the importance of receiving HepA and HepB vaccines today. We also talked about ways to best manage the pain you're experiencing because of the fluid in your abdomen.    Please remember to fill your prescription of spironolactone, as this medication, in addition to taking the Lasix will best help with slowing the fluid collecting in your abdomen.    Natalie Starr MD PGY-1

## 2020-08-14 ENCOUNTER — OFFICE VISIT - HEALTHEAST (OUTPATIENT)
Dept: FAMILY MEDICINE | Facility: CLINIC | Age: 35
End: 2020-08-14

## 2020-08-14 DIAGNOSIS — K20.90 ESOPHAGITIS: ICD-10-CM

## 2020-08-14 DIAGNOSIS — R10.84 GENERALIZED ABDOMINAL PAIN: ICD-10-CM

## 2020-08-14 DIAGNOSIS — K70.31 ALCOHOLIC CIRRHOSIS OF LIVER WITH ASCITES (H): ICD-10-CM

## 2020-08-14 DIAGNOSIS — K72.00 SUBACUTE LIVER FAILURE WITHOUT HEPATIC COMA: ICD-10-CM

## 2020-08-14 DIAGNOSIS — I10 HTN, GOAL BELOW 130/80: ICD-10-CM

## 2020-08-14 ASSESSMENT — MIFFLIN-ST. JEOR: SCORE: 1878.86

## 2020-08-20 ENCOUNTER — HOSPITAL ENCOUNTER (OUTPATIENT)
Dept: ULTRASOUND IMAGING | Facility: CLINIC | Age: 35
Discharge: HOME | End: 2020-08-20
Attending: INTERNAL MEDICINE | Admitting: RADIOLOGY

## 2020-08-20 ENCOUNTER — COMMUNICATION - HEALTHEAST (OUTPATIENT)
Dept: FAMILY MEDICINE | Facility: CLINIC | Age: 35
End: 2020-08-20

## 2020-08-20 DIAGNOSIS — K70.31 ALCOHOLIC CIRRHOSIS OF LIVER WITH ASCITES (H): ICD-10-CM

## 2020-08-20 DIAGNOSIS — I10 HTN, GOAL BELOW 130/80: ICD-10-CM

## 2020-08-20 DIAGNOSIS — K20.90 ESOPHAGITIS: ICD-10-CM

## 2020-08-20 DIAGNOSIS — K70.11 ALCOHOLIC HEPATITIS WITH ASCITES (H): ICD-10-CM

## 2020-08-21 ENCOUNTER — AMBULATORY - HEALTHEAST (OUTPATIENT)
Dept: ULTRASOUND IMAGING | Facility: CLINIC | Age: 35
End: 2020-08-21

## 2020-08-21 DIAGNOSIS — Z11.59 ENCOUNTER FOR SCREENING FOR OTHER VIRAL DISEASES: ICD-10-CM

## 2020-08-24 ENCOUNTER — AMBULATORY - HEALTHEAST (OUTPATIENT)
Dept: FAMILY MEDICINE | Facility: CLINIC | Age: 35
End: 2020-08-24

## 2020-08-24 DIAGNOSIS — Z11.59 ENCOUNTER FOR SCREENING FOR OTHER VIRAL DISEASES: ICD-10-CM

## 2020-08-26 ENCOUNTER — HOSPITAL ENCOUNTER (OUTPATIENT)
Dept: ULTRASOUND IMAGING | Facility: CLINIC | Age: 35
Discharge: HOME | End: 2020-08-26
Attending: INTERNAL MEDICINE

## 2020-08-26 ENCOUNTER — OFFICE VISIT - HEALTHEAST (OUTPATIENT)
Dept: FAMILY MEDICINE | Facility: CLINIC | Age: 35
End: 2020-08-26

## 2020-08-26 DIAGNOSIS — K76.6 PORTAL HYPERTENSION (H): ICD-10-CM

## 2020-08-26 DIAGNOSIS — R10.84 GENERALIZED ABDOMINAL PAIN: ICD-10-CM

## 2020-08-26 DIAGNOSIS — K70.31 ALCOHOLIC CIRRHOSIS OF LIVER WITH ASCITES (H): ICD-10-CM

## 2020-08-28 ENCOUNTER — HOSPITAL ENCOUNTER (OUTPATIENT)
Dept: ULTRASOUND IMAGING | Facility: CLINIC | Age: 35
Discharge: HOME | End: 2020-08-28
Attending: INTERNAL MEDICINE | Admitting: RADIOLOGY

## 2020-08-28 DIAGNOSIS — K70.11 ALCOHOLIC HEPATITIS WITH ASCITES (H): ICD-10-CM

## 2020-08-29 ENCOUNTER — COMMUNICATION - HEALTHEAST (OUTPATIENT)
Dept: SCHEDULING | Facility: CLINIC | Age: 35
End: 2020-08-29

## 2020-09-03 ENCOUNTER — HOSPITAL ENCOUNTER (OUTPATIENT)
Dept: ULTRASOUND IMAGING | Facility: CLINIC | Age: 35
Discharge: HOME | End: 2020-09-03
Attending: INTERNAL MEDICINE

## 2020-09-03 DIAGNOSIS — K70.11 ALCOHOLIC HEPATITIS WITH ASCITES (H): ICD-10-CM

## 2020-09-15 ENCOUNTER — AMBULATORY - HEALTHEAST (OUTPATIENT)
Dept: ULTRASOUND IMAGING | Facility: CLINIC | Age: 35
End: 2020-09-15

## 2020-09-15 DIAGNOSIS — Z11.59 ENCOUNTER FOR SCREENING FOR OTHER VIRAL DISEASES: ICD-10-CM

## 2020-09-22 ENCOUNTER — AMBULATORY - HEALTHEAST (OUTPATIENT)
Dept: ULTRASOUND IMAGING | Facility: CLINIC | Age: 35
End: 2020-09-22

## 2020-09-22 ENCOUNTER — HOSPITAL ENCOUNTER (OUTPATIENT)
Dept: ULTRASOUND IMAGING | Facility: CLINIC | Age: 35
Discharge: HOME | End: 2020-09-22
Attending: INTERNAL MEDICINE

## 2020-09-22 DIAGNOSIS — Z11.59 ENCOUNTER FOR SCREENING FOR OTHER VIRAL DISEASES: ICD-10-CM

## 2020-09-23 ENCOUNTER — COMMUNICATION - HEALTHEAST (OUTPATIENT)
Dept: INTERVENTIONAL RADIOLOGY/VASCULAR | Facility: CLINIC | Age: 35
End: 2020-09-23

## 2020-09-24 ENCOUNTER — COMMUNICATION - HEALTHEAST (OUTPATIENT)
Dept: SCHEDULING | Facility: CLINIC | Age: 35
End: 2020-09-24

## 2020-09-24 DIAGNOSIS — K70.31 ALCOHOLIC CIRRHOSIS OF LIVER WITH ASCITES (H): ICD-10-CM

## 2020-09-24 DIAGNOSIS — I10 HTN, GOAL BELOW 130/80: ICD-10-CM

## 2020-10-03 ENCOUNTER — COMMUNICATION - HEALTHEAST (OUTPATIENT)
Dept: SCHEDULING | Facility: CLINIC | Age: 35
End: 2020-10-03

## 2020-10-07 ENCOUNTER — HOSPITAL ENCOUNTER (OUTPATIENT)
Dept: ULTRASOUND IMAGING | Facility: CLINIC | Age: 35
Discharge: HOME | End: 2020-10-07
Attending: INTERNAL MEDICINE

## 2020-10-09 DIAGNOSIS — Z11.59 ENCOUNTER FOR SCREENING FOR OTHER VIRAL DISEASES: Primary | ICD-10-CM

## 2020-10-23 ENCOUNTER — AMBULATORY - HEALTHEAST (OUTPATIENT)
Dept: ULTRASOUND IMAGING | Facility: HOSPITAL | Age: 35
End: 2020-10-23

## 2020-10-23 DIAGNOSIS — Z11.59 ENCOUNTER FOR SCREENING FOR OTHER VIRAL DISEASES: ICD-10-CM

## 2020-10-28 ENCOUNTER — OFFICE VISIT - HEALTHEAST (OUTPATIENT)
Dept: FAMILY MEDICINE | Facility: CLINIC | Age: 35
End: 2020-10-28

## 2020-10-28 DIAGNOSIS — K70.31 ALCOHOLIC CIRRHOSIS OF LIVER WITH ASCITES (H): ICD-10-CM

## 2020-10-30 ENCOUNTER — COMMUNICATION - HEALTHEAST (OUTPATIENT)
Dept: FAMILY MEDICINE | Facility: CLINIC | Age: 35
End: 2020-10-30

## 2020-10-30 DIAGNOSIS — I10 HTN, GOAL BELOW 130/80: ICD-10-CM

## 2020-10-30 DIAGNOSIS — K70.31 ALCOHOLIC CIRRHOSIS OF LIVER WITH ASCITES (H): ICD-10-CM

## 2020-11-02 ENCOUNTER — AMBULATORY - HEALTHEAST (OUTPATIENT)
Dept: LAB | Facility: CLINIC | Age: 35
End: 2020-11-02

## 2020-11-02 DIAGNOSIS — Z11.59 ENCOUNTER FOR SCREENING FOR OTHER VIRAL DISEASES: ICD-10-CM

## 2020-11-02 NOTE — PROVIDER NOTIFICATION
eMail address confirmed: Pt requests endoscopy appointment information communication via unencrypted e-mail.  This includes: MiraLax instructions, Unit J map link/map, Conscious Sedation procedure date/time/location/provider.  Pt acknowledges that they have agreed to accept the risks and receive unencrypted communications for this incidence.     Britney Mclain RN  [facility] End[facility]

## 2020-11-04 ENCOUNTER — HOSPITAL ENCOUNTER (OUTPATIENT)
Facility: AMBULATORY SURGERY CENTER | Age: 35
Discharge: HOME | End: 2020-11-04
Attending: INTERNAL MEDICINE | Admitting: INTERNAL MEDICINE
Payer: COMMERCIAL

## 2020-11-04 ENCOUNTER — COMMUNICATION - HEALTHEAST (OUTPATIENT)
Dept: SCHEDULING | Facility: CLINIC | Age: 35
End: 2020-11-04

## 2020-11-04 VITALS
HEIGHT: 71 IN | DIASTOLIC BLOOD PRESSURE: 87 MMHG | HEART RATE: 102 BPM | OXYGEN SATURATION: 98 % | SYSTOLIC BLOOD PRESSURE: 141 MMHG | RESPIRATION RATE: 20 BRPM | TEMPERATURE: 97.5 F | WEIGHT: 204 LBS | BODY MASS INDEX: 28.56 KG/M2

## 2020-11-04 LAB — COLONOSCOPY: NORMAL

## 2020-11-04 PROCEDURE — 88305 TISSUE EXAM BY PATHOLOGIST: CPT | Mod: 26 | Performed by: PATHOLOGY

## 2020-11-04 PROCEDURE — 45385 COLONOSCOPY W/LESION REMOVAL: CPT

## 2020-11-04 PROCEDURE — 45380 COLONOSCOPY AND BIOPSY: CPT | Mod: 59

## 2020-11-04 RX ORDER — SIMETHICONE
LIQUID (ML) MISCELLANEOUS PRN
Status: DISCONTINUED | OUTPATIENT
Start: 2020-11-04 | End: 2020-11-04 | Stop reason: HOSPADM

## 2020-11-04 RX ORDER — LIDOCAINE 40 MG/G
CREAM TOPICAL
Status: DISCONTINUED | OUTPATIENT
Start: 2020-11-04 | End: 2020-11-04 | Stop reason: HOSPADM

## 2020-11-04 RX ORDER — FLUMAZENIL 0.1 MG/ML
0.2 INJECTION, SOLUTION INTRAVENOUS
Status: DISCONTINUED | OUTPATIENT
Start: 2020-11-04 | End: 2020-11-05 | Stop reason: HOSPADM

## 2020-11-04 RX ORDER — ONDANSETRON 4 MG/1
4 TABLET, ORALLY DISINTEGRATING ORAL EVERY 6 HOURS PRN
Status: DISCONTINUED | OUTPATIENT
Start: 2020-11-04 | End: 2020-11-05 | Stop reason: HOSPADM

## 2020-11-04 RX ORDER — ONDANSETRON 2 MG/ML
4 INJECTION INTRAMUSCULAR; INTRAVENOUS
Status: DISCONTINUED | OUTPATIENT
Start: 2020-11-04 | End: 2020-11-04 | Stop reason: HOSPADM

## 2020-11-04 RX ORDER — PROCHLORPERAZINE MALEATE 10 MG
10 TABLET ORAL EVERY 6 HOURS PRN
Status: DISCONTINUED | OUTPATIENT
Start: 2020-11-04 | End: 2020-11-05 | Stop reason: HOSPADM

## 2020-11-04 RX ORDER — FENTANYL CITRATE 50 UG/ML
INJECTION, SOLUTION INTRAMUSCULAR; INTRAVENOUS PRN
Status: DISCONTINUED | OUTPATIENT
Start: 2020-11-04 | End: 2020-11-04 | Stop reason: HOSPADM

## 2020-11-04 RX ORDER — NALOXONE HYDROCHLORIDE 0.4 MG/ML
.1-.4 INJECTION, SOLUTION INTRAMUSCULAR; INTRAVENOUS; SUBCUTANEOUS
Status: DISCONTINUED | OUTPATIENT
Start: 2020-11-04 | End: 2020-11-05 | Stop reason: HOSPADM

## 2020-11-04 RX ORDER — DIPHENHYDRAMINE HYDROCHLORIDE 50 MG/ML
INJECTION INTRAMUSCULAR; INTRAVENOUS PRN
Status: DISCONTINUED | OUTPATIENT
Start: 2020-11-04 | End: 2020-11-04 | Stop reason: HOSPADM

## 2020-11-04 RX ORDER — ONDANSETRON 2 MG/ML
4 INJECTION INTRAMUSCULAR; INTRAVENOUS EVERY 6 HOURS PRN
Status: DISCONTINUED | OUTPATIENT
Start: 2020-11-04 | End: 2020-11-05 | Stop reason: HOSPADM

## 2020-11-04 ASSESSMENT — MIFFLIN-ST. JEOR: SCORE: 1884.53

## 2020-11-04 NOTE — DISCHARGE INSTRUCTIONS
Discharge Instructions after Colonoscopy      Activity and Diet  You were given medicine for pain. You may be dizzy or sleepy.  For 24 hours:    Do not drive or use heavy equipment.    Do not make important decisions.    Do not drink any alcohol.  You may return to your normal diet and medicines.    Discomfort    Air was placed in your colon during the exam in order to see it. Walking helps to pass the air.    You may take Tylenol (acetaminophen) for pain unless your doctor has told you not to.  Do not take aspirin or ibuprofen (Advil, Motrin, or other anti-inflammatory  drugs) for _____ days.    Follow-up  ____ We took small tissue samples or polyps to study. Your doctor will call you with the results  within two weeks.    When to call:    Call right away if you have:    Unusual pain in belly or chest pain not relieved with passing air.    More than 1 to 2 Tablespoons of bleeding from your rectum.    Fever above 100.6  F (37.5  C).    If you have severe pain, bleeding, or shortness of breath, go to an emergency room.    If you have questions, call:  Monday to Friday, 7 a.m. to 4:30 p.m.  Endoscopy: [phone] (We may have to call you back)    After hours  Hospital: [phone] (Ask for the GI fellow on call)

## 2020-11-05 ENCOUNTER — HOSPITAL ENCOUNTER (OUTPATIENT)
Dept: ULTRASOUND IMAGING | Facility: HOSPITAL | Age: 35
Discharge: HOME | End: 2020-11-05
Attending: INTERNAL MEDICINE | Admitting: RADIOLOGY

## 2020-11-05 DIAGNOSIS — K70.11 ALCOHOLIC HEPATITIS WITH ASCITES (H): ICD-10-CM

## 2020-11-05 LAB — COPATH REPORT: NORMAL

## 2020-11-06 ENCOUNTER — COMMUNICATION - HEALTHEAST (OUTPATIENT)
Dept: FAMILY MEDICINE | Facility: CLINIC | Age: 35
End: 2020-11-06

## 2020-11-13 ENCOUNTER — OFFICE VISIT - HEALTHEAST (OUTPATIENT)
Dept: FAMILY MEDICINE | Facility: CLINIC | Age: 35
End: 2020-11-13

## 2020-11-13 DIAGNOSIS — K72.00 SUBACUTE LIVER FAILURE WITHOUT HEPATIC COMA: ICD-10-CM

## 2020-11-13 DIAGNOSIS — R11.0 NAUSEA: ICD-10-CM

## 2020-11-13 DIAGNOSIS — R10.84 ABDOMINAL PAIN, GENERALIZED: ICD-10-CM

## 2020-11-13 LAB
ANION GAP SERPL CALCULATED.3IONS-SCNC: 10 MMOL/L (ref 5–18)
BUN SERPL-MCNC: 7 MG/DL (ref 8–22)
CALCIUM SERPL-MCNC: 8.2 MG/DL (ref 8.5–10.5)
CHLORIDE BLD-SCNC: 109 MMOL/L (ref 98–107)
CO2 SERPL-SCNC: 23 MMOL/L (ref 22–31)
CREAT SERPL-MCNC: 0.83 MG/DL (ref 0.7–1.3)
GFR SERPL CREATININE-BSD FRML MDRD: >60 ML/MIN/1.73M2
GLUCOSE BLD-MCNC: 92 MG/DL (ref 70–125)
POTASSIUM BLD-SCNC: 3.9 MMOL/L (ref 3.5–5)
SODIUM SERPL-SCNC: 142 MMOL/L (ref 136–145)

## 2020-11-13 ASSESSMENT — MIFFLIN-ST. JEOR: SCORE: 1915.15

## 2020-11-19 ENCOUNTER — HOSPITAL ENCOUNTER (OUTPATIENT)
Dept: ULTRASOUND IMAGING | Facility: HOSPITAL | Age: 35
Discharge: HOME | End: 2020-11-19
Attending: INTERNAL MEDICINE

## 2020-11-20 ENCOUNTER — COMMUNICATION - HEALTHEAST (OUTPATIENT)
Dept: FAMILY MEDICINE | Facility: CLINIC | Age: 35
End: 2020-11-20

## 2020-12-02 ENCOUNTER — AMBULATORY - HEALTHEAST (OUTPATIENT)
Dept: ULTRASOUND IMAGING | Facility: CLINIC | Age: 35
End: 2020-12-02

## 2020-12-02 DIAGNOSIS — Z11.59 ENCOUNTER FOR SCREENING FOR OTHER VIRAL DISEASES: ICD-10-CM

## 2020-12-03 ENCOUNTER — HOSPITAL ENCOUNTER (OUTPATIENT)
Dept: ULTRASOUND IMAGING | Facility: HOSPITAL | Age: 35
Discharge: HOME | End: 2020-12-03
Attending: INTERNAL MEDICINE

## 2020-12-06 ENCOUNTER — AMBULATORY - HEALTHEAST (OUTPATIENT)
Dept: FAMILY MEDICINE | Facility: CLINIC | Age: 35
End: 2020-12-06

## 2020-12-06 DIAGNOSIS — Z11.59 ENCOUNTER FOR SCREENING FOR OTHER VIRAL DISEASES: ICD-10-CM

## 2020-12-07 LAB
SARS-COV-2 PCR COMMENT: NORMAL
SARS-COV-2 RNA SPEC QL NAA+PROBE: NEGATIVE
SARS-COV-2 VIRUS SPECIMEN SOURCE: NORMAL

## 2020-12-08 ENCOUNTER — COMMUNICATION - HEALTHEAST (OUTPATIENT)
Dept: SCHEDULING | Facility: CLINIC | Age: 35
End: 2020-12-08

## 2020-12-08 ENCOUNTER — HOSPITAL ENCOUNTER (OUTPATIENT)
Dept: ULTRASOUND IMAGING | Facility: CLINIC | Age: 35
Discharge: HOME | End: 2020-12-08
Attending: INTERNAL MEDICINE

## 2020-12-09 ENCOUNTER — RECORDS - HEALTHEAST (OUTPATIENT)
Dept: SCHEDULING | Facility: CLINIC | Age: 35
End: 2020-12-09

## 2020-12-09 ENCOUNTER — HOSPITAL ENCOUNTER (OUTPATIENT)
Dept: ULTRASOUND IMAGING | Facility: CLINIC | Age: 35
Discharge: HOME | End: 2020-12-09
Attending: INTERNAL MEDICINE

## 2020-12-09 DIAGNOSIS — K70.11 ALCOHOLIC HEPATITIS WITH ASCITES (H): ICD-10-CM

## 2021-01-13 ENCOUNTER — OFFICE VISIT - HEALTHEAST (OUTPATIENT)
Dept: FAMILY MEDICINE | Facility: CLINIC | Age: 36
End: 2021-01-13

## 2021-01-13 ENCOUNTER — COMMUNICATION - HEALTHEAST (OUTPATIENT)
Dept: FAMILY MEDICINE | Facility: CLINIC | Age: 36
End: 2021-01-13

## 2021-01-13 DIAGNOSIS — R11.0 NAUSEA: ICD-10-CM

## 2021-01-13 DIAGNOSIS — K70.31 ALCOHOLIC CIRRHOSIS OF LIVER WITH ASCITES (H): ICD-10-CM

## 2021-01-13 DIAGNOSIS — L73.2 HIDRADENITIS SUPPURATIVA: ICD-10-CM

## 2021-01-13 DIAGNOSIS — M25.519 ACUTE SHOULDER PAIN, UNSPECIFIED LATERALITY: ICD-10-CM

## 2021-01-19 ENCOUNTER — TELEPHONE (OUTPATIENT)
Dept: DERMATOLOGY | Facility: CLINIC | Age: 36
End: 2021-01-19

## 2021-03-09 NOTE — CONSULTS
Nephrology General Adult IP Consult: 34y male with alcoholic hepatitis with ascites and GERTRUDIS. Concern for hepatorenal syndrome. Please eval and assist with management.; Consultant may enter orders: Yes; Requesting provider? Attending physician; Sachin...  Consult performed by: Val Andrade MD  Consult ordered by: Derek Laboy MD      I have personally interviewed and examined the patient on 6/26/20. I agree with the documentation as noted by Richard Freitas dated 6/26/20.     Patient is a 34 year old male who was transferred here from [facility] for evaluation of alcoholic hepatitis. He does not have any known kidney issues and baseline serum creatinine on June 14th was at 0.8. He presented to the hospital with jaundice and ascites and was found to have acute alcoholic hepatitis. He was taking NSAIDs in form of ibuprofen before coming to the hospital for a few weeks due to abdominal pain. His serum creatinine upon presentation was 5.1. He underwent paracentesis that was negative for SBP and consistent with portal hypertension. He has received albumin and serum creatinine today is down to 4.17. He is making urine. UA was bland with small degree of proteinuria (pr/cr 0.9) and no hematuria. Abdominal US from yesterday showed no hydronephrosis and normal size kidneys.     #1 Acute kidney injury AKIN stage III likely secondary to ATN in the setting of decompensated cirrhosis and NSAID use   Kidney function is slowly improving. For now recommend to continue with albumin infusion of total of 100 gram per day. Expect the kidney function to improve. No indications for dialysis.     We will continue to follow along.    WILLI reviewed this referral, initially sent by MD on 2/5. Pt has not responded to SW outreach attempted by phone and letter. Pt has no future clinic appointments currently scheduled. SW will d/c following at this time, but will remain available for needed support if pt makes contact in the future.

## 2021-05-24 ENCOUNTER — RECORDS - HEALTHEAST (OUTPATIENT)
Dept: ADMINISTRATIVE | Facility: CLINIC | Age: 36
End: 2021-05-24

## 2021-06-04 VITALS
SYSTOLIC BLOOD PRESSURE: 112 MMHG | TEMPERATURE: 98.6 F | HEIGHT: 71 IN | RESPIRATION RATE: 14 BRPM | BODY MASS INDEX: 31.08 KG/M2 | HEART RATE: 92 BPM | WEIGHT: 222 LBS | DIASTOLIC BLOOD PRESSURE: 72 MMHG | OXYGEN SATURATION: 100 %

## 2021-06-04 VITALS
OXYGEN SATURATION: 99 % | RESPIRATION RATE: 18 BRPM | SYSTOLIC BLOOD PRESSURE: 104 MMHG | DIASTOLIC BLOOD PRESSURE: 72 MMHG | BODY MASS INDEX: 28.14 KG/M2 | HEART RATE: 99 BPM | WEIGHT: 201 LBS | TEMPERATURE: 97.7 F | HEIGHT: 71 IN

## 2021-06-05 VITALS
OXYGEN SATURATION: 100 % | WEIGHT: 209 LBS | DIASTOLIC BLOOD PRESSURE: 68 MMHG | HEIGHT: 71 IN | BODY MASS INDEX: 29.26 KG/M2 | SYSTOLIC BLOOD PRESSURE: 120 MMHG | TEMPERATURE: 98.7 F | HEART RATE: 84 BPM

## 2021-06-09 NOTE — PROGRESS NOTES
Hospital Follow-up Visit:    Assessment/Plan:     1. Subacute liver failure without hepatic coma  2. Alcoholic cirrhosis of liver with ascites (H)  Refill    - oxyCODONE (ROXICODONE) 5 MG immediate release tablet; Take 1 tablet (5 mg total) by mouth daily as needed for abdominal pain  Dispense: 13 tablet; Refill: 0    - Ambulatory referral to Gastroenterology    Recheck  - HM1(CBC and Differential); Future  - Comprehensive Metabolic Panel; Future      3. Benign essential hypertension  Normotensive  Taking Lasix as needed    4. Esophagitis  Continue on pantoprazole daily    5. Portal hypertension (H)                Subjective:     Ian Crowley is a 35 y.o. male who presents for a hospital discharge follow up.      Hospital/Nursing Home/IP Rehab Facility: [facility]  Date of Admission: 7/16/20  Date of Discharge:7/17/20  Reason(s) for Admission:Ascities and paracentesis             Do you have any problems taking your medication regularly?  None       Have you had any changes in your medication since discharge? None       Have you had any difficulty following your discharge or treatment plan?  No    Summary of hospitalization:  Hospital discharge summary reviewed  Diagnostic Tests/Treatments reviewed.  Follow up needed: Gastroenterology     Other Healthcare Providers Involved in Patient's Care: Patient Care Team:  Provider, No Primary Care as PCP - General      Update since discharge: {improved       Post Discharge Medication Reconciliation: discharge medications reconciled, continue medications without change  Plan of care communicated with: patient      Ian is a 35-year-old male with a history of alcoholic hepatitis, esophagitis, ascites and portal hypertensive gastropathy requiring repeated paracentesis due to excessive abdominal bloating and pain. He notes that the pain Starts in the abdomen and moves towards the back, relieved by the paracentesis      Reports her been advised to follow-up with them healthy  "pathology but no one has followed up to schedule him.          Objective:     Vitals:    07/22/20 1612   BP: 112/72   Pulse: 92   Resp: 14   Temp: 98.6  F (37  C)   TempSrc: Oral   SpO2: 100%   Weight: 222 lb (100.7 kg)   Height: 5' 11\" (1.803 m)   PainSc:   6         Physical Exam:  General Appearance:    Alert, well hydrated, no distress,    Eyes:    PERRL, conjunctiva/corneas clear,    Throat:   Lips, mucosa, and tongue normal; teeth and gums normal   Neck:   Supple, symmetrical, trachea midline, no adenopathy;        thyroid:  No enlargement/tenderness/nodules; no carotid    bruit or JVD   Lungs:     Clear to auscultation bilaterally, respirations unlabored   Heart:    Regular rate and rhythm, S1 and S2 normal, no murmur, rub   or gallop   Abdomen:    Ascites, non-tender, normal bowel sounds, no rebound or guarding,    Extremities:   Extremities normal, atraumatic, no cyanosis or edema   Skin:   Skin color, texture, turgor normal, no rashes or lesions          Coding guidelines for this visit:  Type of Medical   Decision Making Face-to-Face Visit       within 7 Days of discharge Face-to-Face Visit        within 14 days of discharge   Moderate Complexity [id number]   High Complexity [id number]       Electronically signed by Tolu Wagner MD 07/22/20 4:10 PM     "

## 2021-06-10 NOTE — TELEPHONE ENCOUNTER
Question following Office Visit  When did you see your provider: 8/14/20  What is your question: Patient was expecting prescriptions for spironolactone 100 mg, furosemide 20 mg and pantoprazole to be sent to [facility] on [facility].  Please send today.  Okay to leave a detailed message: Yes

## 2021-06-10 NOTE — TELEPHONE ENCOUNTER
8/14/2020 Dr Wagner  1. Subacute liver failure without hepatic coma  2. Alcoholic cirrhosis of liver with ascites (H)  Portal hypertension (H)     As met with the Gastroenterology  The diuretic has been increased to 40 mg   Has prn paracenthesis set up to be done outside of the ER.      Pain:   Only received 7 tabs of Oxycodone dispensed by the pharmacy and needs additional refills.    spironolactone (ALDACTONE) 100 MG tablet    7/30/2020   --    Sig - Route: Take 100 mg by mouth daily. - Oral    Class: Historical Med      furosemide (LASIX) 20 MG tablet    7/18/2020   --    Sig - Route: Take 40 mg by mouth daily.  - Oral    Class: Historical Med      pantoprazole (PROTONIX) 40 MG tablet      --    Sig - Route: Take 40 mg by mouth daily. - Oral    Class: Historical Med      Patient thought these were going to be sent to pharmacy.    Teed up medication

## 2021-06-10 NOTE — PROGRESS NOTES
Patient tolerated paracentesis with albumin replacement with initially no concerns then c/o discomfort after removal of paracentesis catheter.    Requesting pain meds. Dr. Dia aware and in to see patient.  5.2 L removed with 25 grams albumin replaced per order.

## 2021-06-10 NOTE — PROGRESS NOTES
Assessment/Plan:        1. Generalized abdominal pain  Refill  - oxyCODONE (ROXICODONE) 5 MG immediate release tablet; Take 1 tablet (5 mg total) by mouth daily as needed for pain.  Dispense: 7 tablet; Refill: 0    2. Subacute liver failure without hepatic coma  3. Alcoholic cirrhosis of liver with ascites (H)  Management per GI    4. Esophagitis  Continue on omeprazole    5. HTN, goal below 130/80  At goal normotensive  On spironolactone and furosemide    Continue current management  Follow up 1 wk - 1 month    25 minutes or greater were spent with the patient today with greater than 50% of the times spent in counseling and management of care.      Subjective:    Patient ID:   Ian Crowley is a 35 y.o. male comes in follow up of alcoholic cirrhosis of liver with ascites and portal hypertension.  He has been seen by the gastroenterology and his Lasix increased to 40 mg and scheduled to have paracentesis as needed.  For the abdominal pain managed by oxycodone, only received 7 tabs dispensed by the pharmacy needing additional refills    Results for IAN CROWLEY (MRN [id number]) as of 8/14/2020 09:24   Ref. Range 8/8/2020 08:00   Sodium Latest Ref Range: 136 - 145 mmol/L 135 (L)   Potassium Latest Ref Range: 3.5 - 5.0 mmol/L 3.4 (L)   Chloride Latest Ref Range: 98 - 107 mmol/L 108 (H)   CO2 Latest Ref Range: 22 - 31 mmol/L 21 (L)   Anion Gap, Calculation Latest Ref Range: 5 - 18 mmol/L 6   BUN Latest Ref Range: 8 - 22 mg/dL 7 (L)   Creatinine Latest Ref Range: 0.70 - 1.30 mg/dL 0.95   GFR MDRD Af Amer Latest Ref Range: >60 mL/min/1.73m2 >60   GFR MDRD Non Af Amer Latest Ref Range: >60 mL/min/1.73m2 >60   Calcium Latest Ref Range: 8.5 - 10.5 mg/dL 7.8 (L)   AST Latest Ref Range: 0 - 40 U/L 21   ALT Latest Ref Range: 0 - 45 U/L <9   ALBUMIN Latest Ref Range: 3.5 - 5.0 g/dL 2.5 (L)   Protein, Total Latest Ref Range: 6.0 - 8.0 g/dL 5.2 (L)   Alkaline Phosphatase Latest Ref Range: 45 - 120 U/L 167 (H)   Bilirubin,  "Total Latest Ref Range: 0.0 - 1.0 mg/dL 1.0   Glucose Latest Ref Range: 70 - 125 mg/dL 91            Benign essential hypertension  No additional concerns. Has not started the Spironolactone yet, and will be picking up the prescription today.       Esophagitis   on pantoprazole daily         Review of Systems  Allergy: reviewed  General : negative  A complete 5 point review of systems was obtained and is negative other than what is stated in the HPI.       The following patient's history were reviewed and updated as appropriate:   He  has no past medical history on file..      Outpatient Encounter Medications as of 8/14/2020   Medication Sig Dispense Refill     furosemide (LASIX) 20 MG tablet Take 40 mg by mouth daily.        pantoprazole (PROTONIX) 40 MG tablet Take 40 mg by mouth daily.       spironolactone (ALDACTONE) 100 MG tablet Take 100 mg by mouth daily.       No facility-administered encounter medications on file as of 8/14/2020.          Objective:   /72 (Patient Site: Right Arm, Patient Position: Sitting, Cuff Size: Adult Regular)   Pulse 99   Temp 97.7  F (36.5  C) (Tympanic)   Resp 18   Ht 5' 11\" (1.803 m)   Wt 201 lb (91.2 kg)   SpO2 99%   BMI 28.03 kg/m        Physical Exam  General Appearance:    Alert,  no acute distress, well hydrated    Eyes:    PERRL, conjunctiva/corneas clear, non icterus    Throat:   Lips, mucosa, and tongue normal;  gums normal   Neck:   Supple, symmetrical, trachea midline, no adenopathy;        thyroid:  No enlargement/tenderness/nodules; no carotid    bruit or JVD   Lungs:     Clear to auscultation bilaterally, respirations unlabored   Heart:    Regular rate and rhythm, S1 and S2 normal, no murmur, rub   or gallop   Abdomen:      Mildly ascitic, mildly tender to palpatio , normal bowel sounds, no rebound or guarding, no masses, no organomegaly   Extremities:   Extremities normal, atraumatic, no cyanosis or edema   Skin:   Skin color, texture, turgor normal, no " bruising, rashes or lesions

## 2021-06-10 NOTE — TELEPHONE ENCOUNTER
Called and lvm for patient stating refills were sent to pharmacy    Jenna Figueroa  1959  64 y.o.  female  [facility]    Chief Complaint   Patient presents with    Depression     Report crying spells all the time since off Seroquel    Insomnia    Anxiety    Stress     Psychosocial and due to medical reasons       [place]:   This is a 60 years old divorce  female with past psychiatric history and treatment of manic depression who was seen for the first time on December 28, 2017 referred by her PCP to manage her complicated psychiatric medications. She decided to gradually taper and discontinue Viibryd and high dose Paxil and try Abilify, Seroquel, and finally Latuda. She was not able to tolerate any of them and finally decided to try Geodon. Patient was last seen on November 27 when she decided to continue Geodon 40 mg with dinner and gradually taper and discontinue Seroquel due to the side effect of weight gain and increased sugar in the context of having diabetes and possibility of QTC prolongation on combination of Seroquel and Geodon. She presented on time, was observed to be improved with brighter affect and although denies any depression or anxiety but report significant increase crying spells for no reason and talking detail about her decreasing vision due to diabetic retinopathy.     Patient report compliance with Geodon 40 mg with dinner around 6 but then she may fall sleep and wake up in the middle of the night otherwise she sleep around 10-11 and wake up around 7 to 7:30 in the morning. She required a lot of support today as she was in a negative mood and resisted any idea of improving quality of her life by making excuses.   She was alert awake oriented to time person and place, she was calm and cooperative polite and pleasant and observed to be improved.  She was provided with lots of support and psychoeducation and she decided to increase Geodon 60 mg with dinner and try Cogentin for possible side effect of akathisia versus dystonia as she report chronic jaw pain. She denies any substance abuse at this time she denies any social change except for that she is more social now.       SUBSTANCE USE HISTORY:   TOBACCO: Smoked 1 pack per day for past 35 years and quit his smoking in June. ALCOHOL: Patient denies abuse. CANNABIS: Tried few times but never abused it. COCAINE, OPIOIDS, and OTHERS: Denies abuse. MEDICAL HISTORY:    has a past medical history of Asthma, Asthma, COPD, Depression, Diabetes ([facility] [address] Diabetes mellitus, Encounter for review of form with patient, Essential hypertension, GERD (gastroesophageal reflux disease), Headache(784.0), OTHER MEDICAL, Hypercholesterolemia, Hypertension, Ill-defined condition, Insomnia disorder, Major depression, chronic, Psychiatric problem, Psychotic disorder ([facility] [address] Screening for cervical cancer, Tobacco use disorder, and Unspecified sleep apnea. ALLERGIES:   Allergies   Allergen Reactions    Latex Hives    Other Food Anaphylaxis     PEPPERONI, sloppy joes    Demerol [Meperidine] Anaphylaxis     Difficulty breathing    Iodine Anaphylaxis    Morphine Other (comments)     voilent outbreaks (restraints needed)    Nsaids (Non-Steroidal Anti-Inflammatory Drug) Hives       VITAL SIGNS:  /82   Pulse 89   Ht 5' 8\" (1.727 m)   Wt 84.8 kg (187 lb)   LMP 01/17/2010   BMI 28.43 kg/m²     Current Outpatient Medications   Medication Sig Dispense Refill    benztropine (COGENTIN) 0.5 mg tablet Take 1 Tab by mouth nightly as needed. 30 Tab 2    ziprasidone (GEODON) 60 mg capsule Take 1 Cap by mouth daily (with dinner). 30 Cap 2    tiotropium (SPIRIVA WITH HANDIHALER) 18 mcg inhalation capsule INHALE THE CONTENTS OF 1 CAPSULE VIA HANDIHALER ONCE DAILY. RINSE MOUTH AFTER USE 90 Cap 2    ergocalciferol (ERGOCALCIFEROL) 50,000 unit capsule Take 1 Cap by mouth every seven (7) days.  12 Cap 3    montelukast (SINGULAIR) 10 mg tablet TAKE 1 TABLET BY MOUTH DAILY IN THE EVENING 90 Tab 3    VENTOLIN HFA 90 mcg/actuation inhaler 2 Puffs every four (4) hours as needed.  SYMBICORT 160-4.5 mcg/actuation HFAA INHALE 2 PUFFS BY MOUTH TWICE DAILY FOR COPD ASSOCIATED WITH CHRONIC BRONCHITIS, EXTRINSIC ASTHMA. 1 Inhaler 6    insulin syringe-needle U-100 (BD INSULIN SYRINGE ULTRA-FINE) 1/2 mL 31 gauge x 15/64\" syrg Use for injecting insulin subcutaneously 200 Pen Needle 5    insulin NPH/insulin regular (NOVOLIN 70/30, HUMULIN 70/30) 100 unit/mL (70-30) injection 30 units before breakfast and dinner, Disp# 6 vials 6 Vial 3    metFORMIN ER (GLUCOPHAGE XR) 500 mg tablet Take 2 Tabs by mouth Before breakfast and dinner. 360 Tab 3    Liraglutide (VICTOZA 3-ESTEPHANIE) 0.6 mg/0.1 mL (18 mg/3 mL) pnij 1.8 mg by SubCUTAneous route daily. 9 Pen 3    pantoprazole (PROTONIX) 40 mg tablet Take (1) tablet by mouth once a day. 30 Tab 6    glimepiride (AMARYL) 4 mg tablet Take 1 Tab by mouth every morning. 90 Tab 3    glucose blood VI test strips (EASYMAX N) strip Use to test blood sugar 2 times a day 200 Strip 5    rosuvastatin (CRESTOR) 40 mg tablet Take 1 Tab by mouth nightly. 90 Tab 3    Insulin Needles, Disposable, (URSZULA PEN NEEDLE) 32 gauge x 5/32\" ndle For use twice per day with insulin/victoza. 200 Pen Needle 3    albuterol (PROVENTIL VENTOLIN) 2.5 mg /3 mL (0.083 %) nebulizer solution 3 mL by Nebulization route every four (4) hours as needed for Wheezing or Shortness of Breath. 24 Each 0    BYSTOLIC 10 mg tablet Take 2 tablets PO every AM (Patient taking differently: Take 1 tablets PO every AM) 60 Tab 3    ipratropium (ATROVENT) 0.02 % soln 2.5 mL by Nebulization route every four (4) hours as needed. 70 Vial 7    multivitamin (ONE A DAY) tablet Take 1 Tab by mouth daily.  EPINEPHrine (EPIPEN) 0.3 mg/0.3 mL injection 0.3 mg by IntraMUSCular route once as needed.  diphenhydrAMINE (BENADRYL) 25 mg capsule Take 25 mg by mouth every six (6) hours as needed.          ROS:  Constitutional: Negative for fatigue and weight loss  Eyes: Negative for contacts/glasses  ENT: Negative for hearing loss and tinnitus  Respiratory: Negative for cough or wheezing  Cardiovascular: Negative for chest pain, palpitations  Neurological: Negative for memory problems  Behavioral/psych: Positive for insomnia, anxiety, depression, negative for SI/HI  Endocrine: Patient has diabetes.     MENTAL STATUS EXAMINATION:   Patient is a 64 y. o. female WHITE OR  who looks her stated age. She was observed to be improved with brighter affect and no observable anxiety and although she reported significant increase crying spells she was not observed to be depressed or crying at this time. Patient appearance is nicely dressed with good hygiene and some makeup. Speech is regular rate and rhythm, fluent language, and thought process is linear, logical, and goal directed. Reported mood is better with mood congruent brighter affect. Patient denies any suicidal ideation, homicidal ideation, and auditory visual hallucination. Not observed to be delusional or paranoid. Insight, judgement, reliability and impulse control is intact.  Her cognition is within normal limit and she is observed to be reliable. DIAGNOSIS:  Encounter Diagnoses   Name Primary?  Bipolar 2 disorder, major depressive episode (HCC) Yes    Anxiety, generalized     Insomnia, unspecified type        PLAN:  1. MEDICATION:   1. Patient requested to increase Geodon as she is started to cry a lot since she is off of Seroquel. Another problem is that she take Geodon together with her dinner early and go to sleep around 630 or 7 PM and wake up in the 3 AM.  She was asked to split dinner and take Geodon at bedtime but she decided not to do as she is diabetic and she should not eat after 7 PM.  She requested and agreed to take Geodon 60 mg with dinner.       2. Patient report significant jaw tightness and pain which is a chronic problem but was augmented by [place] and possibly with increasing dose of Geodon. She has tried Benadryl for 1 whole week without any benefit so today we decided to try very low-dose Cogentin 0.5 mg only as needed with understanding of anticholinergic side effect. She understand possibility of side effects of her QTc abnormalities with Geodon. She was provided with psycho education, discussed risk/benefits, and expectations from medication changes. Patient agrees with plan. 2. PSYCHOTHERAPY: Patient was provided with supportive therapy, strongly encourage to seek psychotherapy. 3. MEDICAL CARE: Patient was strongly encourage to take their medical medications and follow up with their PCP on regular basis. She lost 4 pounds in past 5 weeks and her weight today is 187 pounds. We spent a lot of time talking about the complications of her diabetes especially vision where she said that it is hard for her to do anything due to poor and decreasing vision. She also report jaw pain which could be because of many number of reasons but 1 of the side effect of medications like Latuda and Geodon is Jaw pain so I provided her with very low-dose Cogentin with understanding that she will take it as needed due to significant anticholinergic side effects. 4. SUBSTANCE ABUSE CARE: Patient denies a smoking, drinking, abusing any illicit drugs. She quit his smoking about 7 months ago and was very proud to say that. 5. FOLLOW UP:   Follow-up Disposition:  Return in about 6 weeks (around 2/19/2019) for Medication management.

## 2021-06-10 NOTE — PROGRESS NOTES
"Ian Crowley is a 35 y.o. male who is being evaluated via a billable telephone visit.      The patient has been notified of following:     \"This telephone visit will be conducted via a call between you and your physician/provider. We have found that certain health care needs can be provided without the need for a physical exam.  This service lets us provide the care you need with a short phone conversation.  If a prescription is necessary we can send it directly to your pharmacy.  If lab work is needed we can place an order for that and you can then stop by our lab to have the test done at a later time.    Telephone visits are billed at different rates depending on your insurance coverage. During this emergency period, for some insurers they may be billed the same as an in-person visit.  Please reach out to your insurance provider with any questions.    If during the course of the call the physician/provider feels a telephone visit is not appropriate, you will not be charged for this service.\"    Patient has given verbal consent to a Telephone visit? Yes    What phone number would you like to be contacted at? [phone]    Patient would like to receive their AVS by AVS Preference: Sky.    Patient is calling for another refill on his pain medication.   He is reporting that the pain is now better managed with more frequent paracentesis. He is still relying on the medication for more moderate pain relief.       Assessment/Plan:  1. Generalized abdominal pain  Refill authorized   - oxyCODONE (ROXICODONE) 5 MG immediate release tablet; Take 1 tablet (5 mg total) by mouth daily as needed for pain.  Dispense: 7 tablet; Refill: 0  Plan:   Less use , need and dependency on this medication down the road as the pain is better controlled with improving the Ascites were discussed   Patient is agreeable to the plan of care     2. Alcoholic cirrhosis of liver with ascites (H)    3. Portal hypertension (H)        Phone " call duration:  12 minutes    Tolu Wagner MD

## 2021-06-11 NOTE — TELEPHONE ENCOUNTER
Refill Approved    Rx renewed per Medication Renewal Policy. Medication was last renewed on 8/20/20.    Soniya Dickerson, [facility] Triage/Med Refill 9/25/2020     Requested Prescriptions   Pending Prescriptions Disp Refills     furosemide (LASIX) 20 MG tablet 60 tablet 0     Sig: Take 2 tablets (40 mg total) by mouth daily.       Diuretics/Combination Diuretics Refill Protocol  Passed - 9/24/2020 12:50 PM        Passed - Visit with PCP or prescribing provider visit in past 12 months     Last office visit with prescriber/PCP: 8/14/2020 Tolu Wagner MD OR same dept: Visit date not found OR same specialty: Visit date not found  Last physical: Visit date not found Last MTM visit: Visit date not found   Next visit within 3 mo: Visit date not found  Next physical within 3 mo: Visit date not found  Prescriber OR PCP: Tolu Wganer MD  Last diagnosis associated with med order: 1. Alcoholic cirrhosis of liver with ascites (H)  - furosemide (LASIX) 20 MG tablet; Take 2 tablets (40 mg total) by mouth daily.  Dispense: 60 tablet; Refill: 0    2. HTN, goal below 130/80  - furosemide (LASIX) 20 MG tablet; Take 2 tablets (40 mg total) by mouth daily.  Dispense: 60 tablet; Refill: 0    If protocol passes may refill for 12 months if within 3 months of last provider visit (or a total of 15 months).             Passed - Serum Potassium in past 12 months      Lab Results   Component Value Date    Potassium 3.4 (L) 08/08/2020             Passed - Serum Sodium in past 12 months      Lab Results   Component Value Date    Sodium 135 (L) 08/08/2020             Passed - Blood pressure on file in past 12 months     BP Readings from Last 1 Encounters:   08/14/20 104/72             Passed - Serum Creatinine in past 12 months      Creatinine   Date Value Ref Range Status   08/08/2020 0.95 0.70 - 1.30 mg/dL Final

## 2021-06-12 NOTE — TELEPHONE ENCOUNTER
Refill Approved    Rx renewed per Medication Renewal Policy. Medication was last renewed on 8/20/2020.    Migdalia Vazquez, [facility] Triage/Med Refill 11/1/2020     Requested Prescriptions   Pending Prescriptions Disp Refills     furosemide (LASIX) 20 MG tablet 60 tablet 0     Sig: Take 2 tablets (40 mg total) by mouth daily.       Diuretics/Combination Diuretics Refill Protocol  Passed - 10/30/2020  8:42 AM        Passed - Visit with PCP or prescribing provider visit in past 12 months     Last office visit with prescriber/PCP: 8/14/2020 Tolu Wagner MD OR same dept: 8/14/2020 Tolu Wagner MD OR same specialty: 8/14/2020 Tolu Wagner MD  Last physical: Visit date not found Last MTM visit: Visit date not found   Next visit within 3 mo: Visit date not found  Next physical within 3 mo: Visit date not found  Prescriber OR PCP: Tolu Wagner MD  Last diagnosis associated with med order: 1. Alcoholic cirrhosis of liver with ascites (H)  - furosemide (LASIX) 20 MG tablet; Take 2 tablets (40 mg total) by mouth daily.  Dispense: 60 tablet; Refill: 0    2. HTN, goal below 130/80  - furosemide (LASIX) 20 MG tablet; Take 2 tablets (40 mg total) by mouth daily.  Dispense: 60 tablet; Refill: 0    If protocol passes may refill for 12 months if within 3 months of last provider visit (or a total of 15 months).             Passed - Serum Potassium in past 12 months      Lab Results   Component Value Date    Potassium 2.8 (LL) 10/02/2020             Passed - Serum Sodium in past 12 months      Lab Results   Component Value Date    Sodium 136 10/02/2020             Passed - Blood pressure on file in past 12 months     BP Readings from Last 1 Encounters:   10/02/20 121/83             Passed - Serum Creatinine in past 12 months      Creatinine   Date Value Ref Range Status   10/02/2020 1.07 0.70 - 1.30 mg/dL Final

## 2021-06-12 NOTE — PROGRESS NOTES
"Ian Crowley is a 35 y.o. male who is being evaluated via a billable telephone visit.      The patient has been notified of following:     \"This telephone visit will be conducted via a call between you and your physician/provider. We have found that certain health care needs can be provided without the need for a physical exam.  This service lets us provide the care you need with a short phone conversation.  If a prescription is necessary we can send it directly to your pharmacy.  If lab work is needed we can place an order for that and you can then stop by our lab to have the test done at a later time.    Telephone visits are billed at different rates depending on your insurance coverage. During this emergency period, for some insurers they may be billed the same as an in-person visit.  Please reach out to your insurance provider with any questions.    If during the course of the call the physician/provider feels a telephone visit is not appropriate, you will not be charged for this service.\"    Patient has given verbal consent to a Telephone visit? Yes    What phone number would you like to be contacted at?  [phone]    Patient would like to receive their AVS by AVS Preference: Sky.    Ian Crowley is a 35 y.o. male with a history of alcoholic cirrhosis of liver with ascites calling to see if he needs any checkup as he is requesting refill on his diuretics.  He is also wondering if he should have a preop for an upcoming colonoscopy procedure.      Assessment/Plan:  1. Alcoholic cirrhosis of liver with ascites (H)  Patient is advised of Face-to-face evaluation   Will reach out to the GI to see if they are recommending a preop, and if so, will assist in making an appointment for him.       Phone call duration:  10  minutes    Tolu Wagner MD  "

## 2021-06-13 NOTE — PROGRESS NOTES
"Assessment/Plan:        1. Subacute liver failure without hepatic coma    2. Abdominal pain, generalized  Refill  - oxyCODONE (ROXICODONE) 5 MG immediate release tablet; Take 1 tablet (5 mg total) by mouth daily as needed for pain.  Dispense: 13 tablet; Refill: 0    3. Nausea  - Basic Metabolic Panel   Normal  Follow up with hepatology         Subjective:    Patient ID:   Ian Crowley is a 35 y.o. male comes in for follow-up today.  He he has an occasional nausea with no vomiting or any other changes to his bowel movement.  He recently underwent another therapeutic paracentesis.  He is requesting another refill of his Percocet to take intermittently for abdominal pain.       Review of Systems  Allergy: reviewed  General : negative  A complete 5 point review of systems was obtained and is negative other than what is stated in the HPI.       The following patient's history were reviewed and updated as appropriate:   He  has no past medical history on file..      Outpatient Encounter Medications as of 11/13/2020   Medication Sig Dispense Refill     furosemide (LASIX) 20 MG tablet Take 2 tablets (40 mg total) by mouth daily. 180 tablet 3     pantoprazole (PROTONIX) 40 MG tablet Take 1 tablet (40 mg total) by mouth daily. 90 tablet 1     spironolactone (ALDACTONE) 100 MG tablet Take 1 tablet (100 mg total) by mouth daily. 30 tablet 0     No facility-administered encounter medications on file as of 11/13/2020.          Objective:   /68 (Patient Site: Right Arm, Patient Position: Sitting, Cuff Size: Adult Regular)   Pulse 84   Temp 98.7  F (37.1  C)   Ht 5' 11\" (1.803 m)   Wt 209 lb (94.8 kg)   SpO2 100%   BMI 29.15 kg/m        Physical Exam  General Appearance:    Alert,  no acute distress, well hydrated    Eyes:    PERRL, conjunctiva/corneas clear, non icterus    Neck:   Supple, symmetrical, trachea midline, no adenopathy;          Lungs:     Clear to auscultation bilaterally, respirations unlabored "   Heart:    Regular rate and rhythm, S1 and S2 normal, no murmur, rub   or gallop   Abdomen:      Soft, mildly distended, normal bowel sounds, no rebound or guarding,.      Extremities:  No edema   Skin:  No bruising, rashes or lesions

## 2021-06-14 NOTE — PROGRESS NOTES
"Ian Crowley is a 35 y.o. male who is being evaluated via a billable telephone visit.      What phone number would you like to be contacted at? [phone]   How would you like to obtain your AVS? AVS Preference: Sky.  Assessment & Plan     Alcoholic cirrhosis of liver with ascites (H)  Nausea  Discussed trial of weaning off of the diuretic medication, monitor symptoms, and follow up in a month     Refill   - ondansetron (ZOFRAN) 4 MG tablet; Take 1 tablet (4 mg total) by mouth daily as needed for nausea.    Hidradenitis suppurativa  Treatment options were reviewed.  Plan:  - clindamycin (CLEOCIN) 300 MG capsule; Take 1 capsule (300 mg total) by mouth 2 (two) times a day for 7 days.    For any other skin rash or concern advised to follow-up in the office for further evaluation and management    Acute shoulder pain, unspecified laterality  Consider impingement  Discussed trial of taking the ibupforn, and follow up in office if not better , discussed to avoid taking narcottics.   - ibuprofen (ADVIL,MOTRIN) 800 MG tablet; Take 1 tablet (800 mg total) by mouth every 8 (eight) hours as needed for pain.      30 minutes spent on the date of the encounter doing chart review, patient visit and documentation          Tobacco Cessation:   reports that he has been smoking cigarettes. He started smoking about 10 years ago. He has been smoking about 0.40 packs per day. He has never used smokeless tobacco.    BMI:   Estimated body mass index is 29.15 kg/m  as calculated from the following:    Height as of 11/13/20: 5' 11\" (1.803 m).    Weight as of 11/13/20: 209 lb (94.8 kg).         No follow-ups on file.    Tolu Wagner MD  [facility]    Subjective     Ian Crowley is 35 y.o. male presenting via Tele-Visit for follow up of     1- liver cirrhosis and ascites.  He notes overall improvement and not needing the paracentesis as much and for the past 2 months.  Is wondering what is next " in terms of taking and managing his condition with diuretics.  Is currently taking his diuretics 2-3 times per week while maintaining the ascites.  He notes to  needing the diuretics,  as holding off will cause the return of the ascites.     2- Shoulder pain, unable to sleep  On going for several days.   No injury      3- History of hidro-suprativa,   Intermittently acting up  Previously been consulted and advised of management with the antibiotics and surgery,   He is requesting a trial of antibiotic at this time.       4- intermittent nausea   protonix has not been helping   Zofran has previously been more effective    5- Having some other skin rash, and wondering if referral to dermatology  would be appropriate.           Objective       Vitals:  No vitals were obtained today due to virtual visit.    Physical Exam              Phone call duration: 35 minutes

## 2021-06-14 NOTE — TELEPHONE ENCOUNTER
Phoned patient regarding appointment today at 11:15 am with Dr david, will schedule with PCP instead for medication check since Dr David recommended this be done for pt

## 2021-06-20 NOTE — LETTER
Letter by Tolu Wagner MD at      Author: Tolu Wagner MD Service: -- Author Type: --    Filed:  Encounter Date: 9/24/2020 Status: (Other)       Ian Crowley  [address] [facility] [address]  [place] [address]      09/29/20      Dear Ian,      In reviewing your records, we have determined a medication check is needed before your next refill, please call the [facility] to schedule an appointment.      Medication check/review      We have made attempts to call you for an appointment, please verify your contact information is correct when calling back for an appointment, or if you have transferred your care to another clinic, please contact us so we can update our records.     Please call [phone] to schedule an appointment.    We believe that a strong preventative care program, including regular physicals and follow-up care is an important part of a healthy lifestyle and we are committed to helping you maintain your health.    Thank you for choosing us as your health care provider.    Sincerely,    Sylvie Candelario CMA - IMAN/CA  [facility]  [address] [address] [phone]

## 2021-06-21 NOTE — LETTER
Letter by Tolu Wagner MD at      Author: Tolu Wagner MD Service: -- Author Type: --    Filed:  Encounter Date: 11/6/2020 Status: (Other)         Ian Crowley  [address] [facility] [address]  [place] [address]               November 6, 2020      Dear Ian:    We are sorry that you missed your appointment with Dr. Wagner on 11/6/2020. Your health and follow-up medical care are important to us. Please call our office as soon as possible so that we may reschedule your appointment. If you have already rescheduled your appointment, please disregard this letter.    Sincerely,        Tolu Wagner MD

## 2021-06-21 NOTE — LETTER
Letter by Tolu Wagner MD at      Author: Tolu Wagner MD Service: -- Author Type: --    Filed:  Encounter Date: 11/20/2020 Status: (Other)         Ian Crowley  [address]  [place] [address]             November 20, 2020         Dear Mr. Crowley,    Below are the results from your recent visit:    Resulted Orders   Basic Metabolic Panel   Result Value Ref Range    Sodium 142 136 - 145 mmol/L    Potassium 3.9 3.5 - 5.0 mmol/L    Chloride 109 (H) 98 - 107 mmol/L    CO2 23 22 - 31 mmol/L    Anion Gap, Calculation 10 5 - 18 mmol/L    Glucose 92 70 - 125 mg/dL    Calcium 8.2 (L) 8.5 - 10.5 mg/dL    BUN 7 (L) 8 - 22 mg/dL    Creatinine 0.83 0.70 - 1.30 mg/dL    GFR MDRD Af Amer >60 >60 mL/min/1.73m2    GFR MDRD Non Af Amer >60 >60 mL/min/1.73m2    Narrative    Fasting Glucose reference range is 70-99 mg/dL per  American Diabetes Association (ADA) guidelines.       Normal results     Please call with questions or contact us using [employer].    Sincerely,        Electronically signed by Tolu Wagner MD

## 2021-07-14 PROBLEM — I10 BENIGN ESSENTIAL HYPERTENSION: Status: RESOLVED | Noted: 2020-07-22 | Resolved: 2020-08-07

## 2021-07-14 PROBLEM — K70.31 ASCITES DUE TO ALCOHOLIC CIRRHOSIS (H): Status: RESOLVED | Noted: 2020-08-07 | Resolved: 2020-08-07

## 2021-07-25 ENCOUNTER — HEALTH MAINTENANCE LETTER (OUTPATIENT)
Age: 36
End: 2021-07-25

## 2021-08-03 PROBLEM — K76.6 PORTAL HYPERTENSION (H): Status: RESOLVED | Noted: 2020-07-22 | Resolved: 2020-08-07

## 2021-09-19 ENCOUNTER — HEALTH MAINTENANCE LETTER (OUTPATIENT)
Age: 36
End: 2021-09-19

## 2022-08-21 ENCOUNTER — HEALTH MAINTENANCE LETTER (OUTPATIENT)
Age: 37
End: 2022-08-21

## 2022-11-21 ENCOUNTER — HEALTH MAINTENANCE LETTER (OUTPATIENT)
Age: 37
End: 2022-11-21

## 2023-01-30 ENCOUNTER — OFFICE VISIT (OUTPATIENT)
Dept: FAMILY MEDICINE | Facility: CLINIC | Age: 38
End: 2023-01-30
Payer: COMMERCIAL

## 2023-01-30 VITALS
TEMPERATURE: 100.6 F | HEART RATE: 88 BPM | OXYGEN SATURATION: 99 % | BODY MASS INDEX: 31.78 KG/M2 | HEIGHT: 71 IN | SYSTOLIC BLOOD PRESSURE: 151 MMHG | WEIGHT: 227 LBS | DIASTOLIC BLOOD PRESSURE: 98 MMHG

## 2023-01-30 DIAGNOSIS — K70.31 ALCOHOLIC CIRRHOSIS OF LIVER WITH ASCITES (H): ICD-10-CM

## 2023-01-30 DIAGNOSIS — D64.9 ANEMIA, UNSPECIFIED TYPE: ICD-10-CM

## 2023-01-30 DIAGNOSIS — R89.9 ABNORMAL LABORATORY TEST: ICD-10-CM

## 2023-01-30 DIAGNOSIS — R03.0 ELEVATED BP WITHOUT DIAGNOSIS OF HYPERTENSION: ICD-10-CM

## 2023-01-30 DIAGNOSIS — S32.009D CLOSED FRACTURE OF TRANSVERSE PROCESS OF LUMBAR VERTEBRA WITH ROUTINE HEALING, SUBSEQUENT ENCOUNTER: Primary | ICD-10-CM

## 2023-01-30 DIAGNOSIS — L72.3 SEBACEOUS CYST: ICD-10-CM

## 2023-01-30 DIAGNOSIS — S22.32XD CLOSED FRACTURE OF ONE RIB OF LEFT SIDE WITH ROUTINE HEALING, SUBSEQUENT ENCOUNTER: ICD-10-CM

## 2023-01-30 PROCEDURE — 99215 OFFICE O/P EST HI 40 MIN: CPT | Performed by: FAMILY MEDICINE

## 2023-01-30 RX ORDER — ACETAMINOPHEN 500 MG
500-1000 TABLET ORAL EVERY 6 HOURS PRN
Qty: 120 TABLET | Refills: 0 | Status: SHIPPED | OUTPATIENT
Start: 2023-01-30 | End: 2024-05-30

## 2023-01-30 RX ORDER — ACETAMINOPHEN 500 MG
500 TABLET ORAL
COMMUNITY
Start: 2023-01-13 | End: 2023-01-30

## 2023-01-30 RX ORDER — METHOCARBAMOL 500 MG/1
500-1000 TABLET, FILM COATED ORAL 3 TIMES DAILY
Qty: 90 TABLET | Refills: 1 | Status: SHIPPED | OUTPATIENT
Start: 2023-01-30 | End: 2024-05-30

## 2023-01-30 RX ORDER — LIDOCAINE 4 G/G
1-3 PATCH TOPICAL
COMMUNITY
Start: 2023-01-14 | End: 2024-05-30

## 2023-01-30 RX ORDER — GABAPENTIN 100 MG/1
100 CAPSULE ORAL
COMMUNITY
Start: 2023-01-13 | End: 2024-05-30

## 2023-01-30 RX ORDER — METHOCARBAMOL 500 MG/1
500-1000 TABLET, FILM COATED ORAL
COMMUNITY
Start: 2023-01-13 | End: 2023-01-30

## 2023-01-30 RX ORDER — SUCRALFATE ORAL 1 G/10ML
SUSPENSION ORAL
COMMUNITY
Start: 2022-10-20 | End: 2024-05-30

## 2023-01-30 RX ORDER — OXYCODONE HYDROCHLORIDE 5 MG/1
5 TABLET ORAL EVERY 6 HOURS PRN
Qty: 6 TABLET | Refills: 0 | Status: CANCELLED | OUTPATIENT
Start: 2023-01-30

## 2023-01-30 RX ORDER — PANTOPRAZOLE SODIUM 40 MG/1
40 TABLET, DELAYED RELEASE ORAL
COMMUNITY
Start: 2022-10-20 | End: 2024-05-30

## 2023-01-30 RX ORDER — AMOXICILLIN 250 MG
1-2 CAPSULE ORAL
COMMUNITY
Start: 2023-01-13 | End: 2024-05-30

## 2023-01-30 RX ORDER — OXYCODONE HYDROCHLORIDE 5 MG/1
5 TABLET ORAL
COMMUNITY
Start: 2023-01-13 | End: 2024-05-30

## 2023-01-30 ASSESSMENT — PAIN SCALES - GENERAL: PAINLEVEL: EXTREME PAIN (8)

## 2023-01-30 ASSESSMENT — ENCOUNTER SYMPTOMS: BACK PAIN: 1

## 2023-01-30 NOTE — PROGRESS NOTES
Assessment & Plan     Closed fracture of transverse process of lumbar vertebra with routine healing, subsequent encounter  Closed fracture of one rib of left side with routine healing, subsequent encounter    Acute fractures of the left L1 and L2 transverse processes.  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 starting as of 1/12/23 till end of Feb 23 to allow for healing to return to work without restrictions    Refill:   - methocarbamol (ROBAXIN) 500 MG tablet; Take 1-2 tablets (500-1,000 mg) by mouth 3 times daily  - acetaminophen (TYLENOL) 500 MG tablet; Take 1-2 tablets (500-1,000 mg) by mouth every 6 hours as needed for pain    Anemia, unspecified type  Hemoglobin on admission was 8.5  Chronic   History of alcohol abuse     recheck  - CBC with platelets; Future    Abnormal laboratory test  History of liver cirrhosis  - Comprehensive metabolic panel (BMP + Alb, Alk Phos, ALT, AST, Total. Bili, TP); Future      Elevated BP without diagnosis of hypertension  Elevated BP today  Consider due to pain  Monitor BP  Follow-up in a month    Sebaceous cyst  Exam findings were discussed and reassurance given   may consider surgical consultation for excision        I spent a total of 55 minutes on the day of the visit.   Time spent doing chart review, history and exam, documentation and further activities per the note         MED REC REQUIRED  Post Medication Reconciliation Status: discharge medications reconciled and changed, per note/orders  Nicotine/Tobacco Cessation:  He reports that he has been smoking cigarettes. He started smoking about 12 years ago. He has been smoking an average of .4 packs per day. He has been exposed to tobacco smoke. He has never used smokeless tobacco.  Nicotine/Tobacco Cessation Plan:   Self help information given to patient      BMI:   Estimated body mass index is 31.66 kg/m  as calculated from the  "following:    Height as of this encounter: 1.803 m (5' 11\").    Weight as of this encounter: 103 kg (227 lb).         Return in about 4 weeks (around 2/27/2023) for Follow up, in person, as needed.    Serg Wagner MD  [facility] JACINTA Sosa is a 37 year old, who is following up on recent injury sustaining back and rib fracture due to a fall due to alcohol intoxication.    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  His recent injury on 1/12/2023 and needs healing time until able to return to work with no restrictions.    He was also found to have low hemoglobin at the on the day of the admission (chronically low) with recommendation to follow-up in primary care, for further evaluation.    He is also having a lump growing on the right side of the face close to the right eye, gradually increasing in size noted over the past few months.        Review of Systems   Musculoskeletal: Positive for back pain.            Objective    BP (!) 151/98 (BP Location: Right arm, Patient Position: Right side, Cuff Size: Adult Regular)   Pulse 88   Temp (!) 100.6  F (38.1  C) (Temporal)   Ht 1.803 m (5' 11\")   Wt 103 kg (227 lb)   SpO2 99%   BMI 31.66 kg/m    Body mass index is 31.66 kg/m .       Physical Exam   GENERAL: alert and no distress  SKIN: soft palpable lump locatd on the right temple area.                     "

## 2023-05-05 ENCOUNTER — OFFICE VISIT (OUTPATIENT)
Dept: FAMILY MEDICINE | Facility: CLINIC | Age: 38
End: 2023-05-05
Payer: COMMERCIAL

## 2023-05-05 VITALS
RESPIRATION RATE: 20 BRPM | OXYGEN SATURATION: 99 % | HEIGHT: 71 IN | DIASTOLIC BLOOD PRESSURE: 68 MMHG | WEIGHT: 223.4 LBS | HEART RATE: 74 BPM | BODY MASS INDEX: 31.27 KG/M2 | SYSTOLIC BLOOD PRESSURE: 122 MMHG

## 2023-05-05 DIAGNOSIS — L72.3 SEBACEOUS CYST: ICD-10-CM

## 2023-05-05 DIAGNOSIS — L08.9 INFECTED CYST OF SKIN: Primary | ICD-10-CM

## 2023-05-05 DIAGNOSIS — L72.9 INFECTED CYST OF SKIN: Primary | ICD-10-CM

## 2023-05-05 PROCEDURE — 99214 OFFICE O/P EST MOD 30 MIN: CPT | Performed by: FAMILY MEDICINE

## 2023-05-05 ASSESSMENT — PAIN SCALES - GENERAL: PAINLEVEL: SEVERE PAIN (6)

## 2023-05-05 NOTE — PROGRESS NOTES
"  Assessment & Plan     Infected cyst of skin  The findings were reviewed and consulted with Surgery, with no immediate recommendation for excision until the infection is cleared.     Plan:    Referred to ER for acute management (I&D)   Referral to Plastic surgery         I spent a total of 30 minutes on the day of the visit.   Time spent by me doing chart review, history and exam, documentation and further activities per the note             Serg Wagner MD  [facility]    Amalia Sosa is a 37 year old, presenting for having an enlarging and painful cyst on the right temple which thinks that has gotten infected.          1/30/2023     3:25 PM   Additional Questions   Roomed by Sonia         Review of Systems         Objective    /68 (BP Location: Right arm, Patient Position: Sitting, Cuff Size: Adult Regular)   Pulse 74   Resp 20   Ht 1.803 m (5' 11\")   Wt 101.3 kg (223 lb 6.4 oz)   SpO2 99%   BMI 31.16 kg/m    Body mass index is 31.16 kg/m .     Physical Exam   Infected right-sided facial seb cyst                    "

## 2023-09-17 ENCOUNTER — HEALTH MAINTENANCE LETTER (OUTPATIENT)
Age: 38
End: 2023-09-17

## 2024-05-30 ENCOUNTER — OFFICE VISIT (OUTPATIENT)
Dept: FAMILY MEDICINE | Facility: CLINIC | Age: 39
End: 2024-05-30
Payer: COMMERCIAL

## 2024-05-30 ENCOUNTER — HOSPITAL ENCOUNTER (INPATIENT)
Facility: HOSPITAL | Age: 39
LOS: 2 days | Discharge: HOME | End: 2024-06-01
Attending: EMERGENCY MEDICINE | Admitting: INTERNAL MEDICINE
Payer: COMMERCIAL

## 2024-05-30 ENCOUNTER — APPOINTMENT (OUTPATIENT)
Dept: ULTRASOUND IMAGING | Facility: HOSPITAL | Age: 39
End: 2024-05-30
Payer: COMMERCIAL

## 2024-05-30 ENCOUNTER — APPOINTMENT (OUTPATIENT)
Dept: CT IMAGING | Facility: HOSPITAL | Age: 39
End: 2024-05-30
Payer: COMMERCIAL

## 2024-05-30 VITALS
DIASTOLIC BLOOD PRESSURE: 52 MMHG | WEIGHT: 229.6 LBS | RESPIRATION RATE: 16 BRPM | TEMPERATURE: 97.5 F | BODY MASS INDEX: 32.14 KG/M2 | HEART RATE: 72 BPM | HEIGHT: 71 IN | SYSTOLIC BLOOD PRESSURE: 96 MMHG | OXYGEN SATURATION: 98 %

## 2024-05-30 DIAGNOSIS — R17 JAUNDICE: ICD-10-CM

## 2024-05-30 DIAGNOSIS — R18.8 OTHER ASCITES: ICD-10-CM

## 2024-05-30 DIAGNOSIS — R10.9: Primary | ICD-10-CM

## 2024-05-30 DIAGNOSIS — K70.31 ALCOHOLIC CIRRHOSIS OF LIVER WITH ASCITES (H): ICD-10-CM

## 2024-05-30 DIAGNOSIS — D64.9 ANEMIA, UNSPECIFIED TYPE: ICD-10-CM

## 2024-05-30 DIAGNOSIS — K70.31 ALCOHOLIC CIRRHOSIS OF LIVER WITH ASCITES (H): Primary | ICD-10-CM

## 2024-05-30 LAB
% LINING CELLS, BODY FLUID: 42 %
ABSOLUTE NEUTROPHILS, BODY FLUID: 159 /UL
ALBUMIN SERPL BCG-MCNC: 3.7 G/DL (ref 3.5–5.2)
ALBUMIN UR-MCNC: NEGATIVE MG/DL
ALP SERPL-CCNC: 279 U/L (ref 40–150)
ALT SERPL W P-5'-P-CCNC: 14 U/L (ref 0–70)
ANION GAP SERPL CALCULATED.3IONS-SCNC: 11 MMOL/L (ref 7–15)
ANION GAP SERPL CALCULATED.3IONS-SCNC: 12 MMOL/L (ref 7–15)
APPEARANCE FLD: ABNORMAL
APPEARANCE UR: CLEAR
APTT PPP: 46 SECONDS (ref 22–38)
AST SERPL W P-5'-P-CCNC: 81 U/L (ref 0–45)
BASOPHILS # BLD AUTO: 0.1 10E3/UL (ref 0–0.2)
BASOPHILS # BLD AUTO: 0.1 10E3/UL (ref 0–0.2)
BASOPHILS NFR BLD AUTO: 1 %
BASOPHILS NFR BLD AUTO: 1 %
BILIRUB DIRECT SERPL-MCNC: 9.07 MG/DL (ref 0–0.3)
BILIRUB SERPL-MCNC: 13.9 MG/DL
BILIRUB UR QL STRIP: ABNORMAL
BUN SERPL-MCNC: 18 MG/DL (ref 6–20)
BUN SERPL-MCNC: 18.8 MG/DL (ref 6–20)
CALCIUM SERPL-MCNC: 8.5 MG/DL (ref 8.6–10)
CALCIUM SERPL-MCNC: 9.2 MG/DL (ref 8.6–10)
CELL COUNT BODY FLUID SOURCE: ABNORMAL
CHLORIDE SERPL-SCNC: 100 MMOL/L (ref 98–107)
CHLORIDE SERPL-SCNC: 102 MMOL/L (ref 98–107)
COLOR FLD: YELLOW
COLOR UR AUTO: ABNORMAL
CREAT SERPL-MCNC: 1.09 MG/DL (ref 0.67–1.17)
CREAT SERPL-MCNC: 1.2 MG/DL (ref 0.67–1.17)
DEPRECATED HCO3 PLAS-SCNC: 21 MMOL/L (ref 22–29)
DEPRECATED HCO3 PLAS-SCNC: 22 MMOL/L (ref 22–29)
EGFRCR SERPLBLD CKD-EPI 2021: 80 ML/MIN/1.73M2
EGFRCR SERPLBLD CKD-EPI 2021: 90 ML/MIN/1.73M2
EOSINOPHIL # BLD AUTO: 0.4 10E3/UL (ref 0–0.7)
EOSINOPHIL # BLD AUTO: 0.5 10E3/UL (ref 0–0.7)
EOSINOPHIL NFR BLD AUTO: 4 %
EOSINOPHIL NFR BLD AUTO: 4 %
ERYTHROCYTE [DISTWIDTH] IN BLOOD BY AUTOMATED COUNT: 22 % (ref 10–15)
ERYTHROCYTE [DISTWIDTH] IN BLOOD BY AUTOMATED COUNT: 22.4 % (ref 10–15)
ETHANOL SERPL-MCNC: <0.01 G/DL
GLUCOSE SERPL-MCNC: 115 MG/DL (ref 70–99)
GLUCOSE SERPL-MCNC: 98 MG/DL (ref 70–99)
GLUCOSE UR STRIP-MCNC: NEGATIVE MG/DL
HCT VFR BLD AUTO: 23.9 % (ref 40–53)
HCT VFR BLD AUTO: 28.7 % (ref 40–53)
HGB BLD-MCNC: 7.4 G/DL (ref 13.3–17.7)
HGB BLD-MCNC: 9 G/DL (ref 13.3–17.7)
HGB UR QL STRIP: NEGATIVE
HOLD SPECIMEN: NORMAL
HOLD SPECIMEN: NORMAL
IMM GRANULOCYTES # BLD: 0.1 10E3/UL
IMM GRANULOCYTES # BLD: 0.1 10E3/UL
IMM GRANULOCYTES NFR BLD: 1 %
IMM GRANULOCYTES NFR BLD: 1 %
INR PPP: 1.92 (ref 0.85–1.15)
KETONES UR STRIP-MCNC: NEGATIVE MG/DL
LEUKOCYTE ESTERASE UR QL STRIP: NEGATIVE
LIPASE SERPL-CCNC: 39 U/L (ref 13–60)
LYMPHOCYTES # BLD AUTO: 1.1 10E3/UL (ref 0.8–5.3)
LYMPHOCYTES # BLD AUTO: 1.4 10E3/UL (ref 0.8–5.3)
LYMPHOCYTES NFR BLD AUTO: 10 %
LYMPHOCYTES NFR BLD AUTO: 10 %
LYMPHOCYTES NFR FLD MANUAL: 8 %
MAGNESIUM SERPL-MCNC: 1.4 MG/DL (ref 1.7–2.3)
MCH RBC QN AUTO: 27.6 PG (ref 26.5–33)
MCH RBC QN AUTO: 27.9 PG (ref 26.5–33)
MCHC RBC AUTO-ENTMCNC: 31 G/DL (ref 31.5–36.5)
MCHC RBC AUTO-ENTMCNC: 31.4 G/DL (ref 31.5–36.5)
MCV RBC AUTO: 89 FL (ref 78–100)
MCV RBC AUTO: 89 FL (ref 78–100)
MONOCYTES # BLD AUTO: 0.7 10E3/UL (ref 0–1.3)
MONOCYTES # BLD AUTO: 0.8 10E3/UL (ref 0–1.3)
MONOCYTES NFR BLD AUTO: 6 %
MONOCYTES NFR BLD AUTO: 6 %
MONOS+MACROS NFR FLD MANUAL: 19 %
MUCOUS THREADS #/AREA URNS LPF: PRESENT /LPF
NEUTROPHILS # BLD AUTO: 11.7 10E3/UL (ref 1.6–8.3)
NEUTROPHILS # BLD AUTO: 9 10E3/UL (ref 1.6–8.3)
NEUTROPHILS NFR BLD AUTO: 78 %
NEUTROPHILS NFR BLD AUTO: 78 %
NEUTS BAND NFR FLD MANUAL: 31 %
NITRATE UR QL: NEGATIVE
NRBC # BLD AUTO: 0 10E3/UL
NRBC # BLD AUTO: 0 10E3/UL
NRBC BLD AUTO-RTO: 0 /100
NRBC BLD AUTO-RTO: 0 /100
PH UR STRIP: 6 [PH] (ref 5–7)
PLATELET # BLD AUTO: 159 10E3/UL (ref 150–450)
PLATELET # BLD AUTO: 203 10E3/UL (ref 150–450)
POTASSIUM SERPL-SCNC: 3.7 MMOL/L (ref 3.4–5.3)
POTASSIUM SERPL-SCNC: 3.9 MMOL/L (ref 3.4–5.3)
PROT SERPL-MCNC: 7.4 G/DL (ref 6.4–8.3)
RBC # BLD AUTO: 2.68 10E6/UL (ref 4.4–5.9)
RBC # BLD AUTO: 3.23 10E6/UL (ref 4.4–5.9)
RBC # FLD: 0 /UL
RBC URINE: <1 /HPF
SODIUM SERPL-SCNC: 134 MMOL/L (ref 135–145)
SODIUM SERPL-SCNC: 134 MMOL/L (ref 135–145)
SP GR UR STRIP: 1.01 (ref 1–1.03)
SQUAMOUS EPITHELIAL: <1 /HPF
UROBILINOGEN UR STRIP-MCNC: <2 MG/DL
WBC # BLD AUTO: 11.4 10E3/UL (ref 4–11)
WBC # BLD AUTO: 14.8 10E3/UL (ref 4–11)
WBC # FLD AUTO: 513 /UL
WBC URINE: 1 /HPF

## 2024-05-30 PROCEDURE — 96374 THER/PROPH/DIAG INJ IV PUSH: CPT | Mod: 59

## 2024-05-30 PROCEDURE — 85004 AUTOMATED DIFF WBC COUNT: CPT

## 2024-05-30 PROCEDURE — 49083 ABD PARACENTESIS W/IMAGING: CPT

## 2024-05-30 PROCEDURE — 250N000011 HC RX IP 250 OP 636

## 2024-05-30 PROCEDURE — 89050 BODY FLUID CELL COUNT: CPT

## 2024-05-30 PROCEDURE — 82248 BILIRUBIN DIRECT: CPT | Performed by: STUDENT IN AN ORGANIZED HEALTH CARE EDUCATION/TRAINING PROGRAM

## 2024-05-30 PROCEDURE — 85025 COMPLETE CBC W/AUTO DIFF WBC: CPT | Performed by: EMERGENCY MEDICINE

## 2024-05-30 PROCEDURE — 250N000013 HC RX MED GY IP 250 OP 250 PS 637: Performed by: INTERNAL MEDICINE

## 2024-05-30 PROCEDURE — 250N000011 HC RX IP 250 OP 636: Performed by: EMERGENCY MEDICINE

## 2024-05-30 PROCEDURE — 82310 ASSAY OF CALCIUM: CPT

## 2024-05-30 PROCEDURE — 0W9G3ZZ DRAINAGE OF PERITONEAL CAVITY, PERCUTANEOUS APPROACH: ICD-10-PCS | Performed by: RADIOLOGY

## 2024-05-30 PROCEDURE — 82077 ASSAY SPEC XCP UR&BREATH IA: CPT

## 2024-05-30 PROCEDURE — 87070 CULTURE OTHR SPECIMN AEROBIC: CPT

## 2024-05-30 PROCEDURE — 99223 1ST HOSP IP/OBS HIGH 75: CPT | Performed by: INTERNAL MEDICINE

## 2024-05-30 PROCEDURE — 80048 BASIC METABOLIC PNL TOTAL CA: CPT | Performed by: EMERGENCY MEDICINE

## 2024-05-30 PROCEDURE — 99214 OFFICE O/P EST MOD 30 MIN: CPT | Performed by: FAMILY MEDICINE

## 2024-05-30 PROCEDURE — 120N000001 HC R&B MED SURG/OB

## 2024-05-30 PROCEDURE — 87205 SMEAR GRAM STAIN: CPT

## 2024-05-30 PROCEDURE — 36415 COLL VENOUS BLD VENIPUNCTURE: CPT

## 2024-05-30 PROCEDURE — 84157 ASSAY OF PROTEIN OTHER: CPT

## 2024-05-30 PROCEDURE — P9047 ALBUMIN (HUMAN), 25%, 50ML: HCPCS | Performed by: INTERNAL MEDICINE

## 2024-05-30 PROCEDURE — 250N000013 HC RX MED GY IP 250 OP 250 PS 637: Performed by: HOSPITALIST

## 2024-05-30 PROCEDURE — 85730 THROMBOPLASTIN TIME PARTIAL: CPT

## 2024-05-30 PROCEDURE — 36415 COLL VENOUS BLD VENIPUNCTURE: CPT | Performed by: EMERGENCY MEDICINE

## 2024-05-30 PROCEDURE — 85610 PROTHROMBIN TIME: CPT

## 2024-05-30 PROCEDURE — 82042 OTHER SOURCE ALBUMIN QUAN EA: CPT

## 2024-05-30 PROCEDURE — 74174 CTA ABD&PLVS W/CONTRAST: CPT

## 2024-05-30 PROCEDURE — 93005 ELECTROCARDIOGRAM TRACING: CPT

## 2024-05-30 PROCEDURE — 99285 EMERGENCY DEPT VISIT HI MDM: CPT | Mod: 25

## 2024-05-30 PROCEDURE — 81001 URINALYSIS AUTO W/SCOPE: CPT | Performed by: INTERNAL MEDICINE

## 2024-05-30 PROCEDURE — 250N000011 HC RX IP 250 OP 636: Performed by: INTERNAL MEDICINE

## 2024-05-30 PROCEDURE — 83735 ASSAY OF MAGNESIUM: CPT | Performed by: INTERNAL MEDICINE

## 2024-05-30 PROCEDURE — 83690 ASSAY OF LIPASE: CPT | Performed by: STUDENT IN AN ORGANIZED HEALTH CARE EDUCATION/TRAINING PROGRAM

## 2024-05-30 RX ORDER — LIDOCAINE 40 MG/G
CREAM TOPICAL
Status: DISCONTINUED | OUTPATIENT
Start: 2024-05-30 | End: 2024-06-01 | Stop reason: HOSPADM

## 2024-05-30 RX ORDER — ONDANSETRON 4 MG/1
4 TABLET, ORALLY DISINTEGRATING ORAL EVERY 6 HOURS PRN
Status: DISCONTINUED | OUTPATIENT
Start: 2024-05-30 | End: 2024-06-01 | Stop reason: HOSPADM

## 2024-05-30 RX ORDER — NICOTINE 21 MG/24HR
1 PATCH, TRANSDERMAL 24 HOURS TRANSDERMAL DAILY
Status: DISCONTINUED | OUTPATIENT
Start: 2024-05-31 | End: 2024-05-30

## 2024-05-30 RX ORDER — PHYTONADIONE 5 MG/1
5 TABLET ORAL DAILY
Status: DISCONTINUED | OUTPATIENT
Start: 2024-05-31 | End: 2024-05-30

## 2024-05-30 RX ORDER — CIPROFLOXACIN 500 MG/1
500 TABLET, FILM COATED ORAL DAILY
COMMUNITY
Start: 2024-05-30

## 2024-05-30 RX ORDER — ALBUMIN (HUMAN) 12.5 G/50ML
25 SOLUTION INTRAVENOUS ONCE
Status: COMPLETED | OUTPATIENT
Start: 2024-05-30 | End: 2024-05-30

## 2024-05-30 RX ORDER — NALOXONE HYDROCHLORIDE 0.4 MG/ML
0.4 INJECTION, SOLUTION INTRAMUSCULAR; INTRAVENOUS; SUBCUTANEOUS
Status: DISCONTINUED | OUTPATIENT
Start: 2024-05-30 | End: 2024-06-01 | Stop reason: HOSPADM

## 2024-05-30 RX ORDER — TRAZODONE HYDROCHLORIDE 50 MG/1
1 TABLET, FILM COATED ORAL AT BEDTIME
COMMUNITY
Start: 2024-05-22

## 2024-05-30 RX ORDER — 3% SODIUM CHLORIDE 3 G/100ML
INJECTION, SOLUTION INTRAVENOUS
Status: DISCONTINUED | OUTPATIENT
Start: 2024-05-30 | End: 2024-05-30

## 2024-05-30 RX ORDER — NALOXONE HYDROCHLORIDE 0.4 MG/ML
0.2 INJECTION, SOLUTION INTRAMUSCULAR; INTRAVENOUS; SUBCUTANEOUS
Status: DISCONTINUED | OUTPATIENT
Start: 2024-05-30 | End: 2024-06-01 | Stop reason: HOSPADM

## 2024-05-30 RX ORDER — PROCHLORPERAZINE MALEATE 10 MG
10 TABLET ORAL EVERY 6 HOURS PRN
Status: DISCONTINUED | OUTPATIENT
Start: 2024-05-30 | End: 2024-06-01 | Stop reason: HOSPADM

## 2024-05-30 RX ORDER — CALCIUM CARBONATE 500 MG/1
1000 TABLET, CHEWABLE ORAL 4 TIMES DAILY PRN
Status: DISCONTINUED | OUTPATIENT
Start: 2024-05-30 | End: 2024-06-01 | Stop reason: HOSPADM

## 2024-05-30 RX ORDER — PROCHLORPERAZINE 25 MG
25 SUPPOSITORY, RECTAL RECTAL EVERY 12 HOURS PRN
Status: DISCONTINUED | OUTPATIENT
Start: 2024-05-30 | End: 2024-06-01 | Stop reason: HOSPADM

## 2024-05-30 RX ORDER — CIPROFLOXACIN 500 MG/1
500 TABLET, FILM COATED ORAL DAILY
Status: DISCONTINUED | OUTPATIENT
Start: 2024-05-31 | End: 2024-06-01 | Stop reason: HOSPADM

## 2024-05-30 RX ORDER — ONDANSETRON 2 MG/ML
4 INJECTION INTRAMUSCULAR; INTRAVENOUS EVERY 6 HOURS PRN
Status: DISCONTINUED | OUTPATIENT
Start: 2024-05-30 | End: 2024-06-01 | Stop reason: HOSPADM

## 2024-05-30 RX ORDER — TRAMADOL HYDROCHLORIDE 50 MG/1
50 TABLET ORAL EVERY 6 HOURS PRN
Status: DISCONTINUED | OUTPATIENT
Start: 2024-05-30 | End: 2024-06-01 | Stop reason: HOSPADM

## 2024-05-30 RX ORDER — IOPAMIDOL 755 MG/ML
90 INJECTION, SOLUTION INTRAVASCULAR ONCE
Status: COMPLETED | OUTPATIENT
Start: 2024-05-30 | End: 2024-05-30

## 2024-05-30 RX ORDER — HYDROMORPHONE HCL IN WATER/PF 6 MG/30 ML
0.5 PATIENT CONTROLLED ANALGESIA SYRINGE INTRAVENOUS ONCE
Status: COMPLETED | OUTPATIENT
Start: 2024-05-30 | End: 2024-05-30

## 2024-05-30 RX ORDER — TRAZODONE HYDROCHLORIDE 50 MG/1
50 TABLET, FILM COATED ORAL AT BEDTIME
Status: DISCONTINUED | OUTPATIENT
Start: 2024-05-30 | End: 2024-06-01 | Stop reason: HOSPADM

## 2024-05-30 RX ORDER — LACTULOSE 10 G/15ML
10 SOLUTION ORAL 2 TIMES DAILY
Status: DISCONTINUED | OUTPATIENT
Start: 2024-05-30 | End: 2024-06-01 | Stop reason: HOSPADM

## 2024-05-30 RX ORDER — SPIRONOLACTONE 25 MG/1
50 TABLET ORAL DAILY
Status: DISCONTINUED | OUTPATIENT
Start: 2024-05-31 | End: 2024-06-01 | Stop reason: HOSPADM

## 2024-05-30 RX ORDER — OXYCODONE HYDROCHLORIDE 5 MG/1
5 TABLET ORAL EVERY 6 HOURS PRN
Qty: 6 TABLET | Refills: 0 | Status: CANCELLED | OUTPATIENT
Start: 2024-05-30

## 2024-05-30 RX ORDER — FUROSEMIDE 20 MG
20 TABLET ORAL DAILY
COMMUNITY

## 2024-05-30 RX ORDER — LIDOCAINE 4 G/G
1 PATCH TOPICAL
Status: DISCONTINUED | OUTPATIENT
Start: 2024-05-30 | End: 2024-05-31

## 2024-05-30 RX ORDER — SPIRONOLACTONE 50 MG/1
50 TABLET, FILM COATED ORAL DAILY
COMMUNITY

## 2024-05-30 RX ORDER — NICOTINE 21 MG/24HR
1 PATCH, TRANSDERMAL 24 HOURS TRANSDERMAL DAILY
Status: DISCONTINUED | OUTPATIENT
Start: 2024-05-30 | End: 2024-06-01 | Stop reason: HOSPADM

## 2024-05-30 RX ORDER — FERROUS SULFATE 325(65) MG
325 TABLET ORAL EVERY OTHER DAY
COMMUNITY
Start: 2024-05-22

## 2024-05-30 RX ADMIN — HYDROMORPHONE HYDROCHLORIDE 0.5 MG: 0.2 INJECTION, SOLUTION INTRAMUSCULAR; INTRAVENOUS; SUBCUTANEOUS at 15:00

## 2024-05-30 RX ADMIN — TRAZODONE HYDROCHLORIDE 50 MG: 50 TABLET ORAL at 21:29

## 2024-05-30 RX ADMIN — IOPAMIDOL 90 ML: 755 INJECTION, SOLUTION INTRAVENOUS at 16:08

## 2024-05-30 RX ADMIN — LACTULOSE 10 G: 10 SOLUTION ORAL at 21:29

## 2024-05-30 RX ADMIN — LIDOCAINE 1 PATCH: 4 PATCH TOPICAL at 21:30

## 2024-05-30 RX ADMIN — NICOTINE 1 PATCH: 14 PATCH, EXTENDED RELEASE TRANSDERMAL at 23:21

## 2024-05-30 RX ADMIN — ALBUMIN HUMAN 25 G: 0.25 SOLUTION INTRAVENOUS at 21:30

## 2024-05-30 RX ADMIN — TRAMADOL HYDROCHLORIDE 50 MG: 50 TABLET, COATED ORAL at 21:29

## 2024-05-30 RX ADMIN — Medication 5 MG: at 22:42

## 2024-05-30 ASSESSMENT — ACTIVITIES OF DAILY LIVING (ADL)
ADLS_ACUITY_SCORE: 35
ADLS_ACUITY_SCORE: 20
ADLS_ACUITY_SCORE: 35

## 2024-05-30 ASSESSMENT — COLUMBIA-SUICIDE SEVERITY RATING SCALE - C-SSRS
6. HAVE YOU EVER DONE ANYTHING, STARTED TO DO ANYTHING, OR PREPARED TO DO ANYTHING TO END YOUR LIFE?: NO
1. IN THE PAST MONTH, HAVE YOU WISHED YOU WERE DEAD OR WISHED YOU COULD GO TO SLEEP AND NOT WAKE UP?: NO
2. HAVE YOU ACTUALLY HAD ANY THOUGHTS OF KILLING YOURSELF IN THE PAST MONTH?: NO

## 2024-05-30 ASSESSMENT — PAIN SCALES - GENERAL: PAINLEVEL: EXTREME PAIN (8)

## 2024-05-30 ASSESSMENT — PATIENT HEALTH QUESTIONNAIRE - PHQ9
10. IF YOU CHECKED OFF ANY PROBLEMS, HOW DIFFICULT HAVE THESE PROBLEMS MADE IT FOR YOU TO DO YOUR WORK, TAKE CARE OF THINGS AT HOME, OR GET ALONG WITH OTHER PEOPLE: EXTREMELY DIFFICULT
SUM OF ALL RESPONSES TO PHQ QUESTIONS 1-9: 10
SUM OF ALL RESPONSES TO PHQ QUESTIONS 1-9: 10

## 2024-05-30 NOTE — PROGRESS NOTES
"  {PROVIDER CHARTING PREFERENCE:264663}    Amalia Sosa is a 38 year old, presenting for the following health issues:  Hospital F/U        5/30/2024    12:17 PM   Additional Questions   Roomed by ELVIS Yap   Accompanied by Self     HPI     {MA/LPN/RN Pre-Provider Visit Orders- hCG/UA/Strep (Optional):269385}    Hospital Follow-up Visit:    Hospital/Nursing Home/IP Rehab Facility:  [facility]   Date of Admission: 05/12/2024  Date of Discharge: 05/22/2024  Reason(s) for Admission: Alcoholic hepatitis, Severe anemia, Probable bacterial peritonitis   Was the patient in the ICU or did the patient experience delirium during hospitalization?  No  Do you have any other stressors you would like to discuss with your provider? No    Problems taking medications regularly:  None  Medication changes since discharge: START TAKING ciprofloxacin HCl 500 mg tablet, ferrous sulfate (65 mg elemental) tablet, furosemide 20 mg tablet, metroNIDAZOLE 500 mg tablet, oxyCODONE 5 mg immediate release tablet, spironolactone 50 mg tablet, traZODone 50 mg tablet   Problems adhering to non-medication therapy:  None    Summary of hospitalization:  {:127930}  Diagnostic Tests/Treatments reviewed.  Follow up needed: {:014546:}  Other Healthcare Providers Involved in Patient s Care:         {those currently involved after discharge:775443::\"None\"}  Update since discharge: {:958320} {TIP  Include information from family/caregivers, SNF, Care Coordination :505835}        Plan of care communicated with {:906149}     {Reference  Coding guidelines- Moderate Complexity F2F/Video within 7 - 14 days of discharge 0223460, High Complexity F2F/Video within 7 days 1999312 or diulqx34 days 0147876 :939994}      {additonal problems for provider to add (Optional):874993}  {additonal problems for provider to add (Optional):130523}    {ROS Picklists (Optional):787611}      Objective    There were no vitals taken for this visit.  There is no height " or weight on file to calculate BMI.  Physical Exam   {Exam List (Optional):953504}    {Diagnostic Test Results (Optional):000250}        Signed Electronically by: Serg Wagner MD  {Email feedback regarding this note to [email or web address]   :504740}

## 2024-05-30 NOTE — ED PROVIDER NOTES
"Emergency Department Midlevel Supervisory Note     I had a face to face encounter with this patient seen by the Advanced Practice Provider (CHELSEA). I personally made/approved the management plan and take responsibility for the patient management. I personally saw patient and performed a substantive portion of the visit including all aspects of the medical decision making.     ED Course:  2:30 PM Keena Guallpa PA-C staffed patient with me. I agree with their assessment and plan of management, and I will see the patient.  2:40 PM I met with the patient to introduce myself, gather additional history, perform my initial exam, and discuss the plan.   6:18 PM Pt will need hospitalization for uncontrolled alcohol liver failure/cirrhosis, sent in by primary for admission.  Paracentesis completed and labs sent, pending.  Rest of workup reassuring, CTA neg for active bleeding, which was all rectal blood.      Cell count returned much latter when pt was on floor.  Keena updated Dr. Rinaldi and recommended a dose of IV rocephin for now.      Brief HPI:     Ian Crowley is a 38 year old male who presents for evaluation of worsening jaundice and generalized abdominal pain since after hospital admission on 5/12/24. He also notes room-spinning dizziness with standing for 30-40 second episodes, but he denies falling.     I, hSerri Brooke, am serving as a scribe to document services personally performed by Lane Clarke DO, based on my observations and the provider's statements to me.   I, Lane Clarke DO, attest that Sherri Brooke was acting in a scribe capacity, has observed my performance of the services and has documented them in accordance with my direction.    Brief Physical Exam: BP 99/55 (BP Location: Left arm, Patient Position: Semi-Lin's)   Pulse 71   Temp 98  F (36.7  C)   Resp 16   Ht 1.778 m (5' 10\")   Wt 104.9 kg (231 lb 4.2 oz)   SpO2 98%   BMI 33.18 kg/m    Physical Exam  Vitals and nursing " "note reviewed.   Constitutional:       General: He is not in acute distress.     Comments: Chronically ill appearing   HENT:      Head: Normocephalic and atraumatic.      Nose: Nose normal.      Mouth/Throat:      Mouth: Mucous membranes are moist.   Eyes:      General: Lids are normal.      Pupils: Pupils are equal, round, and reactive to light.      Comments: +scleral icterus   Cardiovascular:      Rate and Rhythm: Normal rate and regular rhythm.      Pulses: Normal pulses.           Radial pulses are 2+ on the right side and 2+ on the left side.        Dorsalis pedis pulses are 2+ on the right side and 2+ on the left side.   Pulmonary:      Effort: Pulmonary effort is normal. No respiratory distress.      Breath sounds: Normal breath sounds.   Abdominal:      Palpations: Abdomen is soft.      Comments: Mild diffuse tenderness with associated distension   Musculoskeletal:      Cervical back: Full passive range of motion without pain and neck supple.      Comments: No calf tenderness or swelling b/l   Skin:     General: Skin is warm.      Coloration: Skin is jaundiced.   Neurological:      General: No focal deficit present.      Mental Status: He is alert. Mental status is at baseline.      Comments: Fluent speech, no acute lateralizing deficits   Psychiatric:         Mood and Affect: Mood normal.         Behavior: Behavior normal.            MDM:  Pt seen in conjunction with CHELSEA Keena Guallpa, history of alcoholic liver failure, here from primary clinic \"for admission\".  Pt recently with GI bleed, uncontrolled jaundice, not on lactulose, but not altered. Pt distended, afebrile, currenlty on cipro.  Had recent paracentesis with negative cultures.  Agree with labs, CT imaging, hospitalization.  Will get repeat paracentesis for diagnostic and therapeutic reasons.  Pt will need hospitalization.         1. Anemia, unspecified type    2. Other ascites          Labs and Imaging:  Results for orders placed or performed " during the hospital encounter of 05/30/24   CTA Abdomen Pelvis with Contrast    Impression    IMPRESSION:  1.  No evidence for active gastrointestinal tract bleeding.  2.  Cirrhosis with sequelae of portal venous hypertension including trace ascites, paraesophageal varices and moderate splenomegaly.     US Paracentesis without Albumin    Impression    IMPRESSION:  1.  Status post ultrasound-guided paracentesis.    Reference CPT Code: 68914   Basic metabolic panel   Result Value Ref Range    Sodium 134 (L) 135 - 145 mmol/L    Potassium 3.9 3.4 - 5.3 mmol/L    Chloride 100 98 - 107 mmol/L    Carbon Dioxide (CO2) 22 22 - 29 mmol/L    Anion Gap 12 7 - 15 mmol/L    Urea Nitrogen 18.8 6.0 - 20.0 mg/dL    Creatinine 1.20 (H) 0.67 - 1.17 mg/dL    GFR Estimate 80 >60 mL/min/1.73m2    Calcium 9.2 8.6 - 10.0 mg/dL    Glucose 115 (H) 70 - 99 mg/dL   CBC with platelets and differential   Result Value Ref Range    WBC Count 14.8 (H) 4.0 - 11.0 10e3/uL    RBC Count 3.23 (L) 4.40 - 5.90 10e6/uL    Hemoglobin 9.0 (L) 13.3 - 17.7 g/dL    Hematocrit 28.7 (L) 40.0 - 53.0 %    MCV 89 78 - 100 fL    MCH 27.9 26.5 - 33.0 pg    MCHC 31.4 (L) 31.5 - 36.5 g/dL    RDW 22.4 (H) 10.0 - 15.0 %    Platelet Count 203 150 - 450 10e3/uL    % Neutrophils 78 %    % Lymphocytes 10 %    % Monocytes 6 %    % Eosinophils 4 %    % Basophils 1 %    % Immature Granulocytes 1 %    NRBCs per 100 WBC 0 <1 /100    Absolute Neutrophils 11.7 (H) 1.6 - 8.3 10e3/uL    Absolute Lymphocytes 1.4 0.8 - 5.3 10e3/uL    Absolute Monocytes 0.8 0.0 - 1.3 10e3/uL    Absolute Eosinophils 0.5 0.0 - 0.7 10e3/uL    Absolute Basophils 0.1 0.0 - 0.2 10e3/uL    Absolute Immature Granulocytes 0.1 <=0.4 10e3/uL    Absolute NRBCs 0.0 10e3/uL   Extra Blue Top Tube   Result Value Ref Range    Hold Specimen JIC    Extra Red Top Tube   Result Value Ref Range    Hold Specimen JIC    Basic metabolic panel   Result Value Ref Range    Sodium 134 (L) 135 - 145 mmol/L    Potassium 3.7 3.4 - 5.3  mmol/L    Chloride 102 98 - 107 mmol/L    Carbon Dioxide (CO2) 21 (L) 22 - 29 mmol/L    Anion Gap 11 7 - 15 mmol/L    Urea Nitrogen 18.0 6.0 - 20.0 mg/dL    Creatinine 1.09 0.67 - 1.17 mg/dL    GFR Estimate 90 >60 mL/min/1.73m2    Calcium 8.5 (L) 8.6 - 10.0 mg/dL    Glucose 98 70 - 99 mg/dL   Result Value Ref Range    INR 1.92 (H) 0.85 - 1.15   Partial thromboplastin time   Result Value Ref Range    aPTT 46 (H) 22 - 38 Seconds   Result Value Ref Range    Lipase 39 13 - 60 U/L   Hepatic function panel   Result Value Ref Range    Protein Total 7.4 6.4 - 8.3 g/dL    Albumin 3.7 3.5 - 5.2 g/dL    Bilirubin Total 13.9 (H) <=1.2 mg/dL    Alkaline Phosphatase 279 (H) 40 - 150 U/L    AST 81 (H) 0 - 45 U/L    ALT 14 0 - 70 U/L    Bilirubin Direct 9.07 (H) 0.00 - 0.30 mg/dL   Cell Count Body Fluid   Result Value Ref Range    Color Yellow Colorless, Yellow    Clarity Hazy (A) Clear    RBC Count 0 /uL    Cell Count Fluid Source Abdomen     Total Nucleated Cells 513 /uL   Differential Body Fluid   Result Value Ref Range    % Neutrophils 31 %    % Lymphocytes 8 %    % Monocyte/Macrophages 19 %    % Lining Cells 42 %    Absolute Neutrophils, Body Fluid 159.0   /uL   CBC with platelets and differential   Result Value Ref Range    WBC Count 11.4 (H) 4.0 - 11.0 10e3/uL    RBC Count 2.68 (L) 4.40 - 5.90 10e6/uL    Hemoglobin 7.4 (L) 13.3 - 17.7 g/dL    Hematocrit 23.9 (L) 40.0 - 53.0 %    MCV 89 78 - 100 fL    MCH 27.6 26.5 - 33.0 pg    MCHC 31.0 (L) 31.5 - 36.5 g/dL    RDW 22.0 (H) 10.0 - 15.0 %    Platelet Count 159 150 - 450 10e3/uL    % Neutrophils 78 %    % Lymphocytes 10 %    % Monocytes 6 %    % Eosinophils 4 %    % Basophils 1 %    % Immature Granulocytes 1 %    NRBCs per 100 WBC 0 <1 /100    Absolute Neutrophils 9.0 (H) 1.6 - 8.3 10e3/uL    Absolute Lymphocytes 1.1 0.8 - 5.3 10e3/uL    Absolute Monocytes 0.7 0.0 - 1.3 10e3/uL    Absolute Eosinophils 0.4 0.0 - 0.7 10e3/uL    Absolute Basophils 0.1 0.0 - 0.2 10e3/uL    Absolute  Immature Granulocytes 0.1 <=0.4 10e3/uL    Absolute NRBCs 0.0 10e3/uL   Result Value Ref Range    Alcohol ethyl <0.01 <=0.01 g/dL   Result Value Ref Range    Magnesium 1.4 (L) 1.7 - 2.3 mg/dL   Ascites Fluid Aerobic Bacterial Culture Routine With Gram Stain    Specimen: Peritoneum; Ascites Fluid   Result Value Ref Range    Gram Stain Result No organisms seen     Gram Stain Result 2+ WBC seen        I have reviewed the relevant laboratory studies above.    I independently interpreted the following imaging study(s):       EKG: I reviewed and independently interpreted the patient's EKG, with comments made as listed below. Please see scanned EKG for full report.   Sinus mathew, rate 59, QTc 489, no aucte ischemia    Procedures:  I was present for the key portions of procedures documented in CHELSEA/midlevel note, see midlevel note for further details.    Lane Clarke, [facility] EMERGENCY DEPARTMENT  [facility] [facility] [id number] [phone]      Lane Clarke MD  05/30/24 [id number]

## 2024-05-30 NOTE — ED TRIAGE NOTES
Was recently at [facility] where he presented for jaundice. Patient states he is not getting better. Reporting back pain, fewer BM's, decreased appetite, distended abdomen, abdominal pain when moving/coughing believed to be related to the pressure from distention. Patient reports bright red blood in stools. Saw PCP today and was referred here, wanting scope to check for ulcers or bleeding. Reports dizziness.

## 2024-05-30 NOTE — PHARMACY-ADMISSION MEDICATION HISTORY
Pharmacist Admission Medication History    Admission medication history is complete. The information provided in this note is only as accurate as the sources available at the time of the update.    Information Source(s): Patient, Clinic records, Hospital records, and [facility]    Pertinent Information: discharged from [facility] 5/22/24 after ten day stay. Medication regimen updated to reflect their discharge summary     Changes made to PTA medication list:  Added: None  Deleted: APAP, gabapentin, lidocaine patch, Robaxin, oxycodone, Protonix, senna-s, Carafate   Changed: Cipro, Iron, Lasix, spironolactone     Allergies reviewed with patient and updates made in EHR: yes    Medication History Completed By: Mac Sharpe [facility] 5/30/2024 4:54 PM    PTA Med List   Medication Sig Last Dose    ciprofloxacin (CIPRO) 500 MG tablet Take 500 mg by mouth daily 5/30/2024 at am    ferrous sulfate (FEROSUL) 325 (65 Fe) MG tablet Take 325 mg by mouth every other day 5/30/2024 at pm    furosemide (LASIX) 20 MG tablet Take 20 mg by mouth daily 5/30/2024 at am    spironolactone (ALDACTONE) 50 MG tablet Take 50 mg by mouth daily 5/30/2024 at am    traZODone (DESYREL) 50 MG tablet Take 1 tablet by mouth at bedtime 5/29/2024 at hs

## 2024-05-30 NOTE — PROGRESS NOTES
"  Assessment & Plan   Problem List Items Addressed This Visit       Alcoholic cirrhosis of liver with ascites (H)     Other Visit Diagnoses       Abdominal pain increased    -  Primary    Patient referred to the ER for further evaluation    Jaundice                    MED REC REQUIRED  Post Medication Reconciliation Status: discharge medications reconciled and changed, per note/orders  Nicotine/Tobacco Cessation  He reports that he has been smoking cigarettes. He started smoking about 14 years ago. He has a 5.6 pack-year smoking history. He has been exposed to tobacco smoke. He has never used smokeless tobacco.  Nicotine/Tobacco Cessation Plan      BMI  Estimated body mass index is 32.02 kg/m  as calculated from the following:    Height as of this encounter: 1.803 m (5' 11\").    Weight as of this encounter: 104.1 kg (229 lb 9.6 oz).       Depression Screening Follow Up        5/30/2024    12:14 PM   PHQ   PHQ-9 Total Score 10   Q9: Thoughts of better off dead/self-harm past 2 weeks Not at all                 Subjective   Ian is a 38 year old, with a history of alcoholic hepatitis/ascites presenting for posthospital follow-up admitted for increasing abdominal pain.    Diagnosed with bacterial peritonitis, severe anemia and alcoholic hepatitis.  The asitic fluid was consistent with peritonitis even though the culture did not grow any organism.  He was initially empirically treated with ceftriaxone and Flagyl and shown significant improvement in symptoms.  With diuresis with Aldactone and Lasix manage ascites and consulted and to follow-up outpatient for EtOH use.  He was transfused and discharged on iron supplements with hemoglobin of 7.9.   plan to do outpatient endoscopy/colonoscopy.    On Cipro 500 mg daily for peritonitis prophylaxis    Interim follow-up:  Increasing ascites and abdominal pain , feeling lightheaded and dizzy, having back pain with dark/black stools, urine is appearing darker than usual, loss of " "appetite            5/30/2024    12:17 PM   Additional Questions   Roomed by Aron Ghosh MA   Accompanied by [facility] Follow-up Visit:    Hospital/Nursing Home/ Rehab Facility:  [facility]  Date of Admission: 05/12/2024  Date of Discharge: 05/22/2024  Reason(s) for Admission: Alcoholic hepatitis with ascites   Was the patient in the ICU or did the patient experience delirium during hospitalization?  No  Do you have any other stressors you would like to discuss with your provider? Relationship Concerns Job Concerns and Financial Concerns.    Problems taking medications regularly:  None  Medication changes since discharge: None  Problems adhering to non-medication therapy: None    Summary of hospitalization: [facility]  Diagnostic Tests/Treatments reviewed.  Follow up needed: Outpatient GI  Other Healthcare Providers Involved in Patient s Care:          CD outpatient follow-up  Update since discharge: worsened.         Plan of care communicated with patient and significant other                       Objective    BP 96/52 (BP Location: Right arm, Patient Position: Sitting, Cuff Size: Adult Regular)   Pulse 72   Temp 97.5  F (36.4  C) (Oral)   Resp 16   Ht 1.803 m (5' 11\")   Wt 104.1 kg (229 lb 9.6 oz)   SpO2 98%   BMI 32.02 kg/m    Body mass index is 32.02 kg/m .  Physical Exam               Signed Electronically by: Serg Wagner MD    Answers submitted by the patient for this visit:  Patient Health Questionnaire (Submitted on 5/30/2024)  If you checked off any problems, how difficult have these problems made it for you to do your work, take care of things at home, or get along with other people?: Extremely difficult  PHQ9 TOTAL SCORE: 10    "

## 2024-05-30 NOTE — ED PROVIDER NOTES
EMERGENCY DEPARTMENT ENCOUNTER      NAME: Ian Crowley  AGE: 38 year old male  YOB: 1985  MRN: [id number]  EVALUATION DATE & TIME: 5/30/2024  2:21 PM    PCP: Serg Wagner    ED PROVIDER: Keena Guallpa PA-C      Chief Complaint   Patient presents with    Rectal Bleeding     FINAL IMPRESSION:  1. Anemia, unspecified type    2. Other ascites      ED COURSE & MEDICAL DECISION MAKING:    Pertinent Labs & Imaging studies reviewed. (See chart for details)  38 year old male presents to the Emergency Department for evaluation of abdominal pain.  Per chart review on 5/12/2024 patient was admitted to the hospital for alcohol hepatitis with ascites and anemia.  Patient received a blood transfusion at hemoglobin of 7.9 and given diuretics.  Patient's peritonitis was treated with ceftriaxone and Flagyl.  Patient was discharged with Cipro for prophylaxis.  Patient was seen by his primary care doctor who sent him to the emergency room for admission.  Patient continues to have dizziness upon standing and worsening abdominal pain.  Patient reports that he feels worse than he did prior to his admission on 5/12/2024.  Vital signs reviewed and patient is hypertensive.  Most likely secondary due to symptoms.  Afebrile.  On exam patient is jaundice.  Abdomen is firm and tender to palpation in all 4 extremities.  Patient moves all 4 extremities without difficulty..  Cardiac and pulm exams unremarkable.      Differential diagnosis includes electrolyte abnormality, infection, ascites, peritonitis, anemia.  CBC initially showed a white blood cell count of 14.8.  Hemoglobin at that time was 9.0.  Repeat white blood cell count shows an 11.4 with a hemoglobin of 7.4.  Sodium of 134.  Creatinine is 1.20.  INR is 1.92.  APTT is elevated at 46.  Lipase is within normal limits.  Hepatic shows bilrubin of 13.9, alk phos of 279, AST of 81 and a bilirubin direct of 9.07.  Paracentesis protein, albumin, bacterial and  cell count are pending. Paracentesis removed 1850 ml. CTA shows no evidence for an active GI bleed cirrhosis and sequel of portal vein hypertension including trace ascites, paraesophageal varices and moderate splenomegaly.  Patient was educated on his results. He received a dose of pain medications here in the emergency room and is resting comfortably.  Plan will be to admit the patient.  I spoke with the hospitalist who agrees to admit the patient.  Patient was consented for blood due to the patient's history of varices but we will postpone giving blood at this time. Hospitalist agrees with this plan at this time.Patient was educated on his results.  Patient agrees with plan.  All questions answered.      ED COURSE:   2:30 PM  I saw the patient. Staffed with Dr. Clarke.    5:40 PM I checked on the patient. Patient agrees with the plan.  7:00 PM Spoke with the hospitalist who agrees to admit the patient.  8:10 PM spoke with the hospitalist again about the delay in the paracentesis labs and read.  She is aware and no antibiotics have been ordered at this time.  8:13 PM I spoke with radiology about the paracentesis read.  Unfortunately the radiologist that performed the paracentesis did not sign his note.  His colleague reports that 1805 cc of brown fluid was removed.    At the conclusion of the encounter I discussed the results of all of the tests and the disposition. The questions were answered. The patient or family acknowledged understanding and was agreeable with the care plan.     0 minutes of critical care time       Medical Decision Making  Obtained supplemental history:Supplemental history obtained?: No  Reviewed external records: External records reviewed?: No  Care impacted by chronic illness:N/A  Care significantly affected by social determinants of health:N/A  Did you consider but not order tests?: Work up considered but not performed and documented in chart, if applicable  Did you interpret images  independently?: Independent interpretation of ECG and images noted in documentation, when applicable.  Consultation discussion with other provider:Did you involve another provider (consultant, , pharmacy, etc.)?: I discussed the care with another health care provider, see documentation for details.  Admit.      MEDICATIONS GIVEN IN THE EMERGENCY:  Medications   HYDROmorphone (DILAUDID) injection 0.5 mg (0.5 mg Intravenous $Given 5/30/24 1500)   iopamidol (ISOVUE-370) solution 90 mL (90 mLs Intravenous $Given 5/30/24 1608)       NEW PRESCRIPTIONS STARTED AT TODAY'S ER VISIT  Current Discharge Medication List             =================================================================    [facility]    Patient information was obtained from: patient and family    Use of : N/A        Ian Crowley is a 38 year old male with a pertinent history of cholecystitis with ascites, alcohol use disorder, H. pylori who presents to this ED for evaluation of abdominal pain.    Per chart review on 5/12/2024 patient was admitted to the hospital for alcohol hepatitis with ascites, anemia and peritonitis.  Patient was treated with ceftriaxone and Flagyl for his peritonitis.  Cultures did not grow any bacteria.  Patient was discharged home with Cipro for prophylaxis.  Patient was given diuretics for his ascites.  Patient was found to have a hemoglobin of 7.9 and given a blood transfusion.    After being discharged home patient reports he has had worsening jaundice and abdominal pain in all 4 quadrants.  Patient reports that upon standing he has room spinning dizziness.  Patient reports that he has not fallen from the room spinning dizziness but takes approximately 30 to 40 seconds for the dizziness to resolve after standing.  No fevers or chills.  No urinary or bowel symptoms.  No chest pain or shortness of breath.      REVIEW OF SYSTEMS   As per HPI    PAST MEDICAL HISTORY:  Past Medical History:   Diagnosis Date     Ascites due to alcoholic cirrhosis (H)     Hidradenitis suppurativa        PAST SURGICAL HISTORY:  Past Surgical History:   Procedure Laterality Date    COLONOSCOPY N/A 11/4/2020    Procedure: COLONOSCOPY, WITH POLYPECTOMY;  Surgeon: Edgar De La Rosa DO;  Location: [facility] OR    IR PARACENTESIS  6/26/2020    IR PARACENTESIS  6/30/2020    IR PARACENTESIS  5/20/2024    IR PARACENTESIS  5/14/2024    IR PARACENTESIS  5/17/2024    IR PARACENTESIS  6/11/2020    US PARACENTESIS  6/24/2020    US PARACENTESIS  8/7/2020    US PARACENTESIS  10/2/2020    US PARACENTESIS WITH ALBUMIN  8/12/2020    US PARACENTESIS WITH ALBUMIN  8/20/2020    US PARACENTESIS WITH ALBUMIN  8/28/2020    US PARACENTESIS WITH ALBUMIN  9/3/2020    US PARACENTESIS WITH ALBUMIN  11/5/2020           CURRENT MEDICATIONS:    No current outpatient medications on file.      ALLERGIES:  No Known Allergies    FAMILY HISTORY:  Family History   Problem Relation Age of Onset    Cancer No family hx of     Diabetes No family hx of     Coronary Artery Disease No family hx of     No Known Problems Mother     No Known Problems Father     No Known Problems Sister     No Known Problems Daughter        SOCIAL HISTORY:   Social History     Socioeconomic History    Marital status: Single   Tobacco Use    Smoking status: Every Day     Current packs/day: 0.40     Average packs/day: 0.4 packs/day for 14.0 years (5.6 ttl pk-yrs)     Types: Cigarettes     Start date: 6/1/2010     Passive exposure: Current    Smokeless tobacco: Never   Vaping Use    Vaping status: Never Used   Substance and Sexual Activity    Alcohol use: Not Currently     Comment: Alcoholic Drinks/day: last drink 6/20    Drug use: Yes     Types: Marijuana, Hydromorphone    Sexual activity: Not Currently     Partners: Female   Social History Narrative    Lives with his girlfriend.  One daughter.  Not working     Social Determinants of Health     Financial Resource Strain: Medium Risk (5/13/2024)    Received from [facility]    Financial Resource Strain     Difficulty of Paying Living Expenses: 1     Difficulty of Paying Living Expenses: 2   Food Insecurity: No Food Insecurity (5/12/2024)    Received from [facility]    Food Insecurity     Worried About Running Out of Food in the Last Year: 1   Transportation Needs: No Transportation Needs (5/12/2024)    Received from [facility]    Transportation Needs     Lack of Transportation (Medical): 1   Social Connections: Socially Integrated (5/12/2024)    Received from [facility]    Social Connections     Frequency of Communication with Friends and Family: 0   Housing Stability: Low Risk  (5/12/2024)    Received from [facility]    Housing Stability     Unable to Pay for Housing in the Last Year: 1       VITALS:  /66 (BP Location: Left arm, Patient Position: Semi-Lin's)   Pulse 58   Temp 98.5  F (36.9  C) (Oral)   Resp 18   Ht 1.778 m (5' 10\")   Wt 104.9 kg (231 lb 4.2 oz)   SpO2 99%   BMI 33.18 kg/m      PHYSICAL EXAM    Physical Exam  Vitals and nursing note reviewed.   Constitutional:       Appearance: Normal appearance.   HENT:      Head: Atraumatic.      Right Ear: External ear normal.      Left Ear: External ear normal.      Nose: Nose normal.      Mouth/Throat:      Mouth: Mucous membranes are moist.   Eyes:      Conjunctiva/sclera: Conjunctivae normal.      Pupils: Pupils are equal, round, and reactive to light.   Cardiovascular:      Rate and Rhythm: Normal rate and regular rhythm.      Pulses: Normal pulses.      Heart sounds: Normal heart sounds. No murmur heard.     No friction rub. No gallop.   Pulmonary:      Effort: Pulmonary effort is normal.      Breath sounds: Normal breath sounds. No wheezing or rales.   Abdominal:      Tenderness: There is abdominal tenderness. There is no guarding " (generalized) or rebound.   Musculoskeletal:      Cervical back: Normal range of motion.   Skin:     General: Skin is dry.      Capillary Refill: Capillary refill takes less than 2 seconds.      Coloration: Skin is jaundiced.      Findings: No rash.   Neurological:      General: No focal deficit present.      Mental Status: He is alert and oriented to person, place, and time. Mental status is at baseline.   Psychiatric:         Mood and Affect: Mood normal.         Thought Content: Thought content normal.          LAB:  All pertinent labs reviewed and interpreted.  Labs Ordered and Resulted from Time of ED Arrival to Time of ED Departure   BASIC METABOLIC PANEL - Abnormal       Result Value    Sodium 134 (*)     Potassium 3.9      Chloride 100      Carbon Dioxide (CO2) 22      Anion Gap 12      Urea Nitrogen 18.8      Creatinine 1.20 (*)     GFR Estimate 80      Calcium 9.2      Glucose 115 (*)    CBC WITH PLATELETS AND DIFFERENTIAL - Abnormal    WBC Count 14.8 (*)     RBC Count 3.23 (*)     Hemoglobin 9.0 (*)     Hematocrit 28.7 (*)     MCV 89      MCH 27.9      MCHC 31.4 (*)     RDW 22.4 (*)     Platelet Count 203      % Neutrophils 78      % Lymphocytes 10      % Monocytes 6      % Eosinophils 4      % Basophils 1      % Immature Granulocytes 1      NRBCs per 100 WBC 0      Absolute Neutrophils 11.7 (*)     Absolute Lymphocytes 1.4      Absolute Monocytes 0.8      Absolute Eosinophils 0.5      Absolute Basophils 0.1      Absolute Immature Granulocytes 0.1      Absolute NRBCs 0.0     BASIC METABOLIC PANEL - Abnormal    Sodium 134 (*)     Potassium 3.7      Chloride 102      Carbon Dioxide (CO2) 21 (*)     Anion Gap 11      Urea Nitrogen 18.0      Creatinine 1.09      GFR Estimate 90      Calcium 8.5 (*)     Glucose 98     INR - Abnormal    INR 1.92 (*)    PARTIAL THROMBOPLASTIN TIME - Abnormal    aPTT 46 (*)    HEPATIC FUNCTION PANEL - Abnormal    Protein Total 7.4      Albumin 3.7      Bilirubin Total 13.9 (*)      Alkaline Phosphatase 279 (*)     AST 81 (*)     ALT 14      Bilirubin Direct 9.07 (*)    CELL COUNT BODY FLUID - Abnormal    Color Yellow      Clarity Hazy (*)     RBC Count 0      Cell Count Fluid Source Abdomen      Total Nucleated Cells 513     CBC WITH PLATELETS AND DIFFERENTIAL - Abnormal    WBC Count 11.4 (*)     RBC Count 2.68 (*)     Hemoglobin 7.4 (*)     Hematocrit 23.9 (*)     MCV 89      MCH 27.6      MCHC 31.0 (*)     RDW 22.0 (*)     Platelet Count 159      % Neutrophils 78      % Lymphocytes 10      % Monocytes 6      % Eosinophils 4      % Basophils 1      % Immature Granulocytes 1      NRBCs per 100 WBC 0      Absolute Neutrophils 9.0 (*)     Absolute Lymphocytes 1.1      Absolute Monocytes 0.7      Absolute Eosinophils 0.4      Absolute Basophils 0.1      Absolute Immature Granulocytes 0.1      Absolute NRBCs 0.0     LIPASE - Normal    Lipase 39     ETHYL ALCOHOL LEVEL - Normal    Alcohol ethyl <0.01     DIFERENTIAL BODY FLUID    % Neutrophils 31      % Lymphocytes 8      % Monocyte/Macrophages 19      % Lining Cells 42      Absolute Neutrophils, Body Fluid 159.0     ALBUMIN FLUID   PROTEIN FLUID   AEROBIC BACTERIAL CULTURE ROUTINE    Gram Stain Result No organisms seen     CELL COUNT WITH DIFFERENTIAL FLUID        RADIOLOGY:  Reviewed all pertinent imaging. Please see official radiology report.  CTA Abdomen Pelvis with Contrast   Final Result   IMPRESSION:   1.  No evidence for active gastrointestinal tract bleeding.   2.  Cirrhosis with sequelae of portal venous hypertension including trace ascites, paraesophageal varices and moderate splenomegaly.         US Paracentesis without Albumin    (Results Pending)       EKG:    Performed at: [facility] Emergency Department on 5/30/24 at 16:07:35    Impression: sinus bradycardia. Prolonged QT.    Rate: 59  Rhythm: sinus rhythm  Axis: 72  ME Interval: 186  QRS Interval: 98  QTc Interval: 489  ST Changes: none  Comparison: when compared with ECG of  6/27/20 at 14:37, QT has shortened.     I and Dr. Clarke have reviewed and interpreted the EKG(s) documented above.    Keena Guallpa PA-C  [facility] EMERGENCY DEPARTMENT  [address] [place] [id number]  [phone]     Keena Guallpa PA-C  06/01/24 0859

## 2024-05-30 NOTE — PROGRESS NOTES
"  {PROVIDER CHARTING PREFERENCE:547397}    Amalia Sosa is a 38 year old, presenting for the following health issues:  No chief complaint on file.  {(!) Visit Details have not yet been documented.  Please enter Visit Details and then use this list to pull in documentation. (Optional):187526}  HPI     {MA/LPN/RN Pre-Provider Visit Orders- hCG/UA/Strep (Optional):747047}    Hospital Follow-up Visit:    Hospital/Nursing Home/IP Rehab Facility: {INPT AND SNF DISCHARGE:221944}  Date of Admission: ***  Date of Discharge: ***  Reason(s) for Admission: ***  Was the patient in the ICU or did the patient experience delirium during hospitalization?  {No_YES (delirium):364911}  Do you have any other stressors you would like to discuss with your provider? { :890653}    Problems taking medications regularly:  {:731752}  Medication changes since discharge: {:496219}  Problems adhering to non-medication therapy:  {:820036}    Summary of hospitalization:  {:964966}  Diagnostic Tests/Treatments reviewed.  Follow up needed: {:105343:}  Other Healthcare Providers Involved in Patient s Care:         {those currently involved after discharge:774704::\"None\"}  Update since discharge: {:216582} {TIP  Include information from family/caregivers, SNF, Care Coordination :466594}        Plan of care communicated with {:003818}     {Reference  Coding guidelines- Moderate Complexity F2F/Video within 7 - 14 days of discharge 2074757, High Complexity F2F/Video within 7 days 2810631 or [id number] days 5404570 :428233}      {additonal problems for provider to add (Optional):524047}  {additonal problems for provider to add (Optional):702207}    {ROS Picklists (Optional):936753}      Objective    There were no vitals taken for this visit.  There is no height or weight on file to calculate BMI.  Physical Exam   {Exam List (Optional):831827}    {Diagnostic Test Results (Optional):492728}        Signed Electronically by: Serg Wagner MD  {Email " feedback regarding this note to [email or web address]   :509291}

## 2024-05-31 LAB
ALBUMIN BODY FLUID SOURCE: NORMAL
ALBUMIN FLD-MCNC: 2.1 G/DL
ALBUMIN SERPL BCG-MCNC: 3.2 G/DL (ref 3.5–5.2)
ALP SERPL-CCNC: 247 U/L (ref 40–150)
ALT SERPL W P-5'-P-CCNC: 11 U/L (ref 0–70)
ANION GAP SERPL CALCULATED.3IONS-SCNC: 12 MMOL/L (ref 7–15)
AST SERPL W P-5'-P-CCNC: 61 U/L (ref 0–45)
ATRIAL RATE - MUSE: 59 BPM
BILIRUB SERPL-MCNC: 11.8 MG/DL
BUN SERPL-MCNC: 17.4 MG/DL (ref 6–20)
CALCIUM SERPL-MCNC: 8.8 MG/DL (ref 8.6–10)
CHLORIDE SERPL-SCNC: 103 MMOL/L (ref 98–107)
CREAT SERPL-MCNC: 0.99 MG/DL (ref 0.67–1.17)
DEPRECATED HCO3 PLAS-SCNC: 21 MMOL/L (ref 22–29)
DIASTOLIC BLOOD PRESSURE - MUSE: 85 MMHG
EGFRCR SERPLBLD CKD-EPI 2021: >90 ML/MIN/1.73M2
GLUCOSE SERPL-MCNC: 104 MG/DL (ref 70–99)
HGB BLD-MCNC: 8.2 G/DL (ref 13.3–17.7)
HGB BLD-MCNC: 8.4 G/DL (ref 13.3–17.7)
INR PPP: 1.77 (ref 0.85–1.15)
INTERPRETATION ECG - MUSE: NORMAL
P AXIS - MUSE: 49 DEGREES
POTASSIUM SERPL-SCNC: 3.7 MMOL/L (ref 3.4–5.3)
PR INTERVAL - MUSE: 186 MS
PROT FLD-MCNC: 3.3 G/DL
PROT SERPL-MCNC: 6.5 G/DL (ref 6.4–8.3)
PROTEIN BODY FLUID SOURCE: NORMAL
QRS DURATION - MUSE: 98 MS
QT - MUSE: 494 MS
QTC - MUSE: 489 MS
R AXIS - MUSE: 72 DEGREES
SODIUM SERPL-SCNC: 136 MMOL/L (ref 135–145)
SYSTOLIC BLOOD PRESSURE - MUSE: 125 MMHG
T AXIS - MUSE: 28 DEGREES
VENTRICULAR RATE- MUSE: 59 BPM

## 2024-05-31 PROCEDURE — 120N000001 HC R&B MED SURG/OB

## 2024-05-31 PROCEDURE — 250N000011 HC RX IP 250 OP 636: Performed by: INTERNAL MEDICINE

## 2024-05-31 PROCEDURE — 85018 HEMOGLOBIN: CPT | Performed by: INTERNAL MEDICINE

## 2024-05-31 PROCEDURE — 99233 SBSQ HOSP IP/OBS HIGH 50: CPT | Performed by: HOSPITALIST

## 2024-05-31 PROCEDURE — 80053 COMPREHEN METABOLIC PANEL: CPT | Performed by: INTERNAL MEDICINE

## 2024-05-31 PROCEDURE — 250N000013 HC RX MED GY IP 250 OP 250 PS 637: Performed by: HOSPITALIST

## 2024-05-31 PROCEDURE — 36415 COLL VENOUS BLD VENIPUNCTURE: CPT | Performed by: INTERNAL MEDICINE

## 2024-05-31 PROCEDURE — 85610 PROTHROMBIN TIME: CPT | Performed by: INTERNAL MEDICINE

## 2024-05-31 PROCEDURE — 82105 ALPHA-FETOPROTEIN SERUM: CPT | Performed by: INTERNAL MEDICINE

## 2024-05-31 PROCEDURE — 250N000013 HC RX MED GY IP 250 OP 250 PS 637: Performed by: INTERNAL MEDICINE

## 2024-05-31 RX ORDER — LIDOCAINE 4 G/G
1 PATCH TOPICAL DAILY PRN
Status: DISCONTINUED | OUTPATIENT
Start: 2024-05-31 | End: 2024-06-01 | Stop reason: HOSPADM

## 2024-05-31 RX ADMIN — ONDANSETRON 4 MG: 2 INJECTION INTRAMUSCULAR; INTRAVENOUS at 15:41

## 2024-05-31 RX ADMIN — TRAMADOL HYDROCHLORIDE 50 MG: 50 TABLET, COATED ORAL at 08:42

## 2024-05-31 RX ADMIN — Medication 5 MG: at 21:15

## 2024-05-31 RX ADMIN — LACTULOSE 10 G: 10 SOLUTION ORAL at 08:35

## 2024-05-31 RX ADMIN — CIPROFLOXACIN HYDROCHLORIDE 500 MG: 500 TABLET, FILM COATED ORAL at 08:35

## 2024-05-31 RX ADMIN — Medication 5 MG: at 23:51

## 2024-05-31 RX ADMIN — TRAZODONE HYDROCHLORIDE 50 MG: 50 TABLET ORAL at 21:14

## 2024-05-31 RX ADMIN — LACTULOSE 10 G: 10 SOLUTION ORAL at 21:15

## 2024-05-31 RX ADMIN — TRAMADOL HYDROCHLORIDE 50 MG: 50 TABLET, COATED ORAL at 17:16

## 2024-05-31 RX ADMIN — SPIRONOLACTONE 50 MG: 25 TABLET, FILM COATED ORAL at 08:35

## 2024-05-31 RX ADMIN — NICOTINE 1 PATCH: 14 PATCH, EXTENDED RELEASE TRANSDERMAL at 10:38

## 2024-05-31 ASSESSMENT — ACTIVITIES OF DAILY LIVING (ADL)
ADLS_ACUITY_SCORE: 20
DEPENDENT_IADLS:: INDEPENDENT;MONEY MANAGEMENT
ADLS_ACUITY_SCORE: 20

## 2024-05-31 NOTE — ED NOTES
"[facility] ED Handoff Report    ED Chief Complaint: Rectal Bleeding    ED Diagnosis:  (D64.9) Anemia, unspecified type  Comment:   Plan:     (R18.8) Other ascites  Comment:   Plan:        PMH:    Past Medical History:   Diagnosis Date    Ascites due to alcoholic cirrhosis (H)     Hidradenitis suppurativa         Code Status:  Prior     Falls Risk: No Band: Not applicable    Current Living Situation/Residence: lives in a house     Elimination Status: Continent: Yes     Activity Level: SBA    Patients Preferred Language:  English     Needed: No    Vital Signs:  /66 (BP Location: Left arm, Patient Position: Semi-Lni's)   Pulse 58   Temp 98.5  F (36.9  C) (Oral)   Resp 18   Ht 1.778 m (5' 10\")   Wt 104.9 kg (231 lb 4.2 oz)   SpO2 99%   BMI 33.18 kg/m       Cardiac Rhythm: NSR/SB    Pain Score: 0/10    Is the Patient Confused:  No    Last Food or Drink: 05/30/24 at AM    Focused Assessment:  GI    Tests Performed: Done: Labs, imaging    Treatments Provided:  See MAR    Family Dynamics/Concerns: No    Family Updated On Visitor Policy: Yes    Plan of Care Communicated to Family: Yes    Who Was Updated about Plan of Care: Patient    Belongings Checklist Done and Signed by Patient: Yes    Belongings Sent with Patient: Yes    Medications sent with patient: NA    Covid: asymptomatic , NA    Additional Information: LARON    RN: Rick Loaiza RN   5/30/2024 7:35 PM            "

## 2024-05-31 NOTE — PLAN OF CARE
Alert and oriented. Jaundiced. Pain elevated at generalized abdomen. Tramadol given for pain. Reports drinking 1/2 a liter per day of vodka prior to [facility] hospitalization. Cannot recall last red stool. Had a turkey sandwich, jello and apple sauce. Nicotine patch requested and given. VSS. IV albumin ran, as well as vitamin K given.

## 2024-05-31 NOTE — PROGRESS NOTES
[facility]    Medicine Progress Note - Hospitalist Service    Date of Admission:  5/30/2024    Assessment & Plan      Ian Crowley is a 38 year old male admitted on 5/30/2024. He was recently admitted to [facility] for alcoholic hepatitis, ascites, SBP for 8 days during which he was treated with IV antibiotics and discharged on diuretics and ciprofloxacin for SBP prophylaxis.  He returned back to to [facility] for multiple symptoms workup revealed hemoglobin of 7.4 on admission which is close to his baseline. CT of the abdomen pelvis shows hepatosplenomegaly with portal hypertension and paraesophageal varices.  Also labs revealed GERTRUDIS likely secondary to overdiuresis.  MELD NA score of 28.  GI consulted.    Worsening ascites  GERTRUDIS due to overdiuresis/hepatorenal syndrome - resolved  --Cause is unclear could be due to dietary and fluid indiscretion  --On low-sodium diet and fluid restriction  --S/p 25 g of albumin X1  --S/p paracentesis with removal of 1.85 L of fluid.  May repeat therapeutic paracentesis if continues to be an issue  --Absolute neutrophil count is less than 250 and therefore low suspicion for SBP  --Continue Cipro for SBP prophylaxis  --Continue spironolactone but holding Lasix 2/2 to BP low/normal    Hyponatremia due to hypervolemia - resolved    Anemia likely due to rectal bleeding from possible varices secondary to portal hypertension/liver cirrhosis  --Baseline Hb around 9  --Continue PPI  --Monitor Hb Q12H, last check 8.2  --Transfuse if hemoglobin less than 7  --GI consulted, awaiting eval    Hepatic encephalopathy and constipation  --Continue lactulose 10 g twice daily with a goal to have at least 2 bowel movements a day      Leukocytosis likely due to alcoholic hepatitis  --Trending down    Alcoholic liver cirrhosis with portal hypertension with esophageal and possible rectal varices  --Splenomegaly is likely due to portal hypertension  --MELD NA  "score of 28  --Patient has been sober since 6/20  --GI consulted, awaiting eval      Coagulopathy and jaundice secondary to liver disease  --Anticipate improvement with improvement in liver disease  --Follow-up with liver clinic as outpatient  --S/p vitamin K 5 mg once daily x 5 doses    Lower back pain likely musculoskeletal   --UA not s/o UTI  --Patient is at risk for narcotic abuse and therefore refrain from using narcotics  --Cannot give NSAIDs and Tylenol due to liver disease. On lidocaine patch prn  --Continue tramadol    Smoker and marijuana use  --He also reported hydromorphone abuse  --On nicotine patch.  Please refrain from using narcotics    Dizziness likely due to diuresis and intravascular depletion - improved  --Lasix on hold. S/p albumin  --Fall precautions  --PT/OT consulted          Diet: Combination Diet 2 gm NA Diet  Fluid restriction 1500 ML FLUID    DVT Prophylaxis: Pneumatic Compression Devices  Constantino Catheter: Not present  Lines: None     Cardiac Monitoring: None  Code Status: Full Code      Clinically Significant Risk Factors Present on Admission            # Hypomagnesemia: Lowest Mg = 1.4 mg/dL in last 2 days, will replace as needed   # Hypoalbuminemia: Lowest albumin = 3.2 g/dL at 5/31/2024  6:26 AM, will monitor as appropriate    # Coagulation Defect: INR = 1.77 (Ref range: 0.85 - 1.15) and/or PTT = 46 Seconds (Ref range: 22 - 38 Seconds), will monitor for bleeding         # Obesity: Estimated body mass index is 31.91 kg/m  as calculated from the following:    Height as of this encounter: 1.778 m (5' 10\").    Weight as of this encounter: 100.9 kg (222 lb 6.4 oz).         # Financial/Environmental Concerns: unemployed         Disposition Plan     Medically Ready for Discharge: Anticipated in 2-4 Days             Laurence Valerio MD  Hospitalist Service  [facility]  Securely message with [employer] (more info)  Text page via [employer] Paging/Directory "   ______________________________________________________________________    Interval History   Patient is new to me. Chart reviewed and events noted.  Patient seen and examined.   Notes abdominal pain and distention.  States it is unchanged.  Denies any shortness of breath.  No dizziness.  Denies any bleeding from anywhere.      Physical Exam   Vital Signs: Temp: 98.2  F (36.8  C) Temp src: Oral BP: 111/58 Pulse: 64   Resp: 16 SpO2: 98 % O2 Device: None (Room air)    Weight: 222 lbs 6.4 oz    General: Pleasant, male in NAD  HEENT:EOMI, scleral icterus  CVS:RRR, LE edema  RS:CTAB  Abd: Soft, distended  Neurology:Grossly normal  Skin: Yellowish discoloration all over  Psy:Approrpiate affect      Medical Decision Making       >50 MINUTES SPENT BY ME on the date of service doing chart review, history, exam, documentation & further activities per the note.  MANAGEMENT DISCUSSED with the following over the past 24 hours: Patient, SW/CM       Data     I have personally reviewed the following data over the past 24 hrs:    11.4 (H)  \   8.2 (L)   / 159     136 103 17.4 /  104 (H)   3.7 21 (L) 0.99 \     ALT: 11 AST: 61 (H) AP: 247 (H) TBILI: 11.8 (H)   ALB: 3.2 (L) TOT PROTEIN: 6.5 LIPASE: 39     INR:  1.77 (H) PTT:  46 (H)   D-dimer:  N/A Fibrinogen:  N/A

## 2024-05-31 NOTE — CONSULTS
GASTROENTEROLOGY CONSULT NOTE       CONSULTING PHYSICIAN REASON FOR CONSULTATION   Laurence Valerio MD I was asked to see this patient for rectal bleeding     CHIEF COMPLAINT   Rectal bleeding     HISTORY OF PRESENT ILLNESS   Mr Ian Crowley is a pleasant 38 year old man who presented to the hospital with complaints of rectal bleeding.    Patient tells me that he has rectal bleeding off-and-on and it is not uncommon for him to notice blood in the stools.  He is not alarmed by it.  He has been told to have hemorrhoids before.    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  He recently was found to have spontaneous bacterial peritonitis earlier this month and was treated with antibiotics.  Ascitic fluid PMN count was close to 600.  Cultures were negative.  Given active infection, steroids were not started.    Patient tells me that his last alcoholic drink was 3 weeks ago.    Previous endoscopic history:  Patient had a colonoscopy in May 2020 which showed presence of diverticulosis in the sigmoid colon.  It was good preparation with exam to cecum.  Patient was found to have 1 tubular adenoma. There were nonbleeding internal hemorrhoids.  EGD done in October 2022 showed presence of LA grade D erosive esophagitis.    Patient said that his last alcoholic drink was 3 weeks ago.  He denies any use of NSAIDs.     PAST HISTORY      Past Medical History:   Diagnosis Date    Ascites due to alcoholic cirrhosis (H)     Hidradenitis suppurativa       Past Surgical History:   Procedure Laterality Date    COLONOSCOPY N/A 11/4/2020    Procedure: COLONOSCOPY, WITH POLYPECTOMY;  Surgeon: Edgar De La Rosa DO;  Location: [facility] OR    IR PARACENTESIS  6/26/2020    IR PARACENTESIS  6/30/2020    IR PARACENTESIS  5/20/2024    IR PARACENTESIS  5/14/2024    IR PARACENTESIS  5/17/2024    IR PARACENTESIS  6/11/2020    US PARACENTESIS  6/24/2020    US PARACENTESIS  8/7/2020    US PARACENTESIS   10/2/2020    US PARACENTESIS WITH ALBUMIN  8/12/2020    US PARACENTESIS WITH ALBUMIN  8/20/2020    US PARACENTESIS WITH ALBUMIN  8/28/2020    US PARACENTESIS WITH ALBUMIN  9/3/2020    US PARACENTESIS WITH ALBUMIN  11/5/2020        Family History Social History   Family History   Problem Relation Age of Onset    Cancer No family hx of     Diabetes No family hx of     Coronary Artery Disease No family hx of     No Known Problems Mother     No Known Problems Father     No Known Problems Sister     No Known Problems Daughter      No family history of colon, liver, esophagus, stomach, pancreas cancer. No h/o Pizano esophagus, celiac disease or IBD. Social History   Patient lives with his mom and his sister.  Socioeconomic History    Marital status: Single   Tobacco Use    Smoking status: Every Day     Current packs/day: 0.40     Average packs/day: 0.4 packs/day for 14.0 years (5.6 ttl pk-yrs)     Types: Cigarettes     Start date: 6/1/2010     Passive exposure: Current    Smokeless tobacco: Never   Vaping Use    Vaping status: Never Used   Substance and Sexual Activity    Alcohol use: Not Currently     Comment: Alcoholic Drinks/day: last drink 6/20    Drug use: Yes     Types: Marijuana, Hydromorphone    Sexual activity: Not Currently     Partners: Female   Social History Narrative    Lives with his girlfriend.  One daughter.  Not working     Social Determinants of Health     Financial Resource Strain: Medium Risk (5/13/2024)    [facility]    Financial Resource Strain     Difficulty of Paying Living Expenses: 1     Difficulty of Paying Living Expenses: 2   Food Insecurity: No Food Insecurity (5/12/2024)    [facility]    Food Insecurity     Worried About Running Out of Food in the Last Year: 1   Transportation Needs: No Transportation Needs (5/12/2024)    [facility] from [facility]     Transportation Needs     Lack of Transportation (Medical): 1   Social Connections: Socially Integrated (5/12/2024)    Received from [facility]    Social Connections     Frequency of Communication with Friends and Family: 0   Housing Stability: Low Risk  (5/12/2024)    Received from [facility]    Housing Stability     Unable to Pay for Housing in the Last Year: 1        MEDICATIONS & ALLERGIES   Medications Prior to Admission   Medication Sig Dispense Refill Last Dose    ciprofloxacin (CIPRO) 500 MG tablet Take 500 mg by mouth daily   5/30/2024 at am    ferrous sulfate (FEROSUL) 325 (65 Fe) MG tablet Take 325 mg by mouth every other day   5/30/2024 at pm    furosemide (LASIX) 20 MG tablet Take 20 mg by mouth daily   5/30/2024 at am    spironolactone (ALDACTONE) 50 MG tablet Take 50 mg by mouth daily   5/30/2024 at am    traZODone (DESYREL) 50 MG tablet Take 1 tablet by mouth at bedtime   5/29/2024 at hs      No Known Allergies     Current Facility-Administered Medications:     calcium carbonate (TUMS) chewable tablet 1,000 mg, 1,000 mg, Oral, 4x Daily PRN, Rachid Cabrera MD    ciprofloxacin (CIPRO) tablet 500 mg, 500 mg, Oral, Daily, Rachid Cabrera MD, 500 mg at 05/31/24 0835    lactulose (CHRONULAC) solution 10 g, 10 g, Oral, BID, Rachid Cabrera MD, 10 g at 05/31/24 0835    Lidocaine (LIDOCARE) 4 % Patch 1 patch, 1 patch, Transdermal, Daily PRN, Laurence Valerio MD    lidocaine (LMX4) cream, , Topical, Q1H PRN, Rachid Cabrera MD    lidocaine 1 % 0.1-1 mL, 0.1-1 mL, Other, Q1H PRN, Rachid Cabrera MD    melatonin tablet 5 mg, 5 mg, Oral, At Bedtime PRN, Rachid Cabrera MD    naloxone (NARCAN) injection 0.2 mg, 0.2 mg, Intravenous, Q2 Min PRN **OR** naloxone (NARCAN) injection 0.4 mg, 0.4 mg, Intravenous, Q2 Min PRN **OR** naloxone (NARCAN) injection 0.2 mg, 0.2 mg, Intramuscular, Q2 Min PRN **OR** naloxone (NARCAN) injection 0.4 mg, 0.4 mg,  "Intramuscular, Q2 Min PRN, Rachid Cabrera MD    nicotine (NICODERM CQ) 14 MG/24HR 24 hr patch 1 patch, 1 patch, Transdermal, Daily, Laurence Valerio MD, 1 patch at 05/31/24 1038    ondansetron (ZOFRAN ODT) ODT tab 4 mg, 4 mg, Oral, Q6H PRN **OR** ondansetron (ZOFRAN) injection 4 mg, 4 mg, Intravenous, Q6H PRN, Rachid Cabrera MD    phytonadione (MEPHYTON/VITAMIN K) 1 MG/ML oral solution 5 mg, 5 mg, Oral, QPM, Rachid Cabrera MD, 5 mg at 05/30/24 2242    prochlorperazine (COMPAZINE) injection 10 mg, 10 mg, Intravenous, Q6H PRN **OR** prochlorperazine (COMPAZINE) tablet 10 mg, 10 mg, Oral, Q6H PRN **OR** prochlorperazine (COMPAZINE) suppository 25 mg, 25 mg, Rectal, Q12H PRN, Rachid Cabrera MD    sodium chloride (PF) 0.9% PF flush 3 mL, 3 mL, Intracatheter, Q8H, Rachid Cabrera MD, 3 mL at 05/31/24 0505    sodium chloride (PF) 0.9% PF flush 3 mL, 3 mL, Intracatheter, q1 min prn, Rachid Cabrera MD, 3 mL at 05/30/24 2137    spironolactone (ALDACTONE) tablet 50 mg, 50 mg, Oral, Daily, Rachid Cabrera MD, 50 mg at 05/31/24 0835    traMADol (ULTRAM) tablet 50 mg, 50 mg, Oral, Q6H PRN, Rachid Cabrera MD, 50 mg at 05/31/24 0842    traZODone (DESYREL) tablet 50 mg, 50 mg, Oral, At Bedtime, Rachid Cabrera MD, 50 mg at 05/30/24 2129    REVIEW OF SYSTEMS   Except as noted elsewhere in the note, the ROS is negative for Constitutional, HEENT, CV, Respiratory, GI, , Musculoskeletal, Neuro, Psychiatric, Heme/Lymph node, Allergic, Endocrine, and Skin.  A complete 14-point review of systems was was done and was found to be negative other than what has been stated up in history of present illness.     OBJECTIVE   Vitals /58 (BP Location: Left arm)   Pulse 64   Temp 98.2  F (36.8  C) (Oral)   Resp 16   Ht 1.778 m (5' 10\")   Wt 100.9 kg (222 lb 6.4 oz)   SpO2 98%   BMI 31.91 kg/m           PHYSICAL EXAM:  General:   Patient is lying in bed in no acute distress. Appears well nourished.   Head:  Atraumatic, normocephalic   Eyes: "   Conjunctivae normal, scleral icterus noticed. Extraocular movements intact. Pupils equal, round, and reactive to light.   Ears:  Hearing grossly intact   Mouth:  Oral mucosa moist, normal. No ulcers.   Neck:   Supple, no carotid bruits or elevated JVP. Thyroid not palpable. No cervical lymphadenopathy. Trachea midline.   Chest:  Clear to auscultation bilaterally. No wheezes, rales or rhonchi. Bilateral equal air entry. Normal respiratory effort. No cyanosis.   Heart/vasc:  S1, S2 heard normal. No murmurs, gallops or rubs. All peripheral pulses palpable and symmetric.   Abdomen:   Soft, non-distended, non-tender. No peritoneal signs. No organomegaly. No shifting dullness or ascites. Bowel sounds are normal.   Neurologic:   Alert and oriented x 3. No focal neurologic deficits. Cranial nerves II-XII grossly intact. Strength 5/5 in flexors and extensors of upper and lower extremities. Sensation symmetric and equal in all limbs and on trunk. Reflexes 2+ in elbows, knees and ankles. No asterixis.   Lymphatic:   No axillary, cervical or inguinal lymphadenopathy.   Musculosk:  No joint effusions. No clubbing. No edema.   Psychiatric:  Normal mood, affect and insight. Not depressed, suicidal or homicidal.   Skin:  Warm and dry, color normal appropriate for race. No obvious rashes or lesions noted.     LABORATORY AND IMAGING   BMP   Recent Labs   Lab Test 05/31/24  0626 05/30/24 1616 05/30/24  1439   POTASSIUM 3.7 3.7 3.9   CHLORIDE 103 102 100   CO2 21* 21* 22   BUN 17.4 18.0 18.8   ANIONGAP 12 11 12        HEMATOLOGY PANEL   Recent Labs   Lab Test 05/31/24  1010 05/31/24  0626 05/30/24  1616 05/30/24  1439 10/02/20  1409 10/02/20  1408   WBC  --   --  11.4* 14.8* 8.8  --    HGB  --  8.2* 7.4* 9.0* 8.1*  --    MCV  --   --  89 89 75*  --    PLT  --   --  159 203 385  --    INR 1.77*  --   --  1.92*  --  1.28*        LIVER AND PANCREAS PANEL   Recent Labs   Lab Test 05/31/24  0626 05/30/24  2322 05/30/24  8553  10/02/20  1408 08/06/20  1841 07/06/20  2247 06/26/20  0455 06/25/20 2000 06/25/20  0628 06/24/20  1340   ALBUMIN 3.2*  --  3.7 3.1*   < >  --    < >  --    < > 1.8*   BILITOTAL 11.8*  --  13.9* 0.7   < >  --    < >  --    < > 26.7*   ALT 11  --  14 13   < >  --    < >  --    < > 25   AST 61*  --  81* 50*   < >  --    < >  --    < > 106*   PROTEIN  --  Negative  --   --   --  10*  --  30*  --   --    LIPASE  --   --  39 135*  --   --   --   --   --  42    < > = values in this interval not displayed.     MELD 3.0: 23 at 5/31/2024 10:10 AM  MELD-Na: 23 at 5/31/2024 10:10 AM  Calculated from:  Serum Creatinine: 0.99 mg/dL (Using min of 1 mg/dL) at 5/31/2024  6:26 AM  Serum Sodium: 136 mmol/L at 5/31/2024  6:26 AM  Total Bilirubin: 11.8 mg/dL at 5/31/2024  6:26 AM  Serum Albumin: 3.2 g/dL at 5/31/2024  6:26 AM  INR(ratio): 1.77 at 5/31/2024 10:10 AM  Age at listing (hypothetical): 38 years  Sex: Male at 5/31/2024 10:10 AM    RELEVANT IMAGING:   CT abdomen and pelvis with contrast done on May 30, 2024 showed:  IMPRESSION:  1.  No evidence for active gastrointestinal tract bleeding.  2.  Cirrhosis with sequelae of portal venous hypertension including trace ascites, paraesophageal varices and moderate splenomegaly.     I have reviewed the current diagnostic and laboratory tests.     IMPRESSION / RECOMMENDATIONS   Assessment:  In summary, Mr Ian Crowley is a pleasant 38 year old man who I am seeing in consultation for rectal bleeding.    Recommendations/plan:  1.  Rectal bleeding in a patient with history of internal hemorrhoids  - CT angiogram abdomen pelvis did not show any active GI bleeding.  - Recommend keeping the stool soft with laxatives and fiber  - Hemoglobin is above transfusion threshold.  - Patient had a colonoscopy done 4 years ago which showed presence of internal hemorrhoids.  - Recommend avoiding NSAIDs.    2.  Spontaneous bacterial peritonitis  - Patient remains on Cipro for SBP  prophylaxis.    3.  Recent episode of acute alcohol associated hepatitis  - Patient's bilirubin was 21 earlier this month and has come down to 12 on its own with alcohol cessation.  - Will hold off on initiation of steroids at this point in time.    4.  Decompensated alcohol associated cirrhosis complicated by ascites  MELD 3.0 score is 23 and CTP score is 9/grade B based on labs done on 5/31/2024.  Variceal screening: Patient needs an EGD as an outpatient.  HCC screening: Last ultrasound liver in May 2024 monster not show any hepatocellular carcinoma. Will check alpha-fetoprotein.  Ascites: On lasix 20 mg and spironolactone 50 mg daily as outpatient. Low-salt, high-protein diet emphasized.  Hepatic encephalopathy: not applicable.  Strict alcohol abstinence.  Nutrition: Ensure Max Protein 4 times a day (after each meal and before bedtime).  Will schedule follow up in liver clinic as an outpatient    5. History of gastroesophogeal reflux disease with LA grade D erosive esophagitis  - Recommend daily PPI use.    Thank you for the consult.    Gurinder Bardales MD  [facility] [employer] [facility]  [phone]      This document was created using voice recognition technology. Please excuse any typographical errors that may have occurred, and call with any questions.        Gurinder Bardales MD, FACP   [facility]  [email or web address]       Thank you for the opportunity to participate in the care of this patient.   Please feel free to call with any questions or concerns.  [phone].       CC: [facility]

## 2024-05-31 NOTE — PLAN OF CARE
Problem: Adult Inpatient Plan of Care  Goal: Plan of Care Review  Description: The Plan of Care Review/Shift note should be completed every shift.  The Outcome Evaluation is a brief statement about your assessment that the patient is improving, declining, or no change.  This information will be displayed automatically on your shift  note.  Outcome: Progressing     Problem: Adult Inpatient Plan of Care  Goal: Optimal Comfort and Wellbeing  Outcome: Progressing   Goal Outcome Evaluation:         Patient chely alert and oriented, currently on 1500 ml/day fluid restriction. Uses urinal to void, ambulate independently in the room. VSS on the room air. Slept through the night, no significant event.

## 2024-05-31 NOTE — PROGRESS NOTES
05/31/24 1500   Appointment Info   Signing Clinician's Name / Credentials (OT) KELLY Whittington   Appointment Canceled Reason (OT) Other (see Cancel Comments row)   Appointment Cancel Comments (OT) Per PT, no OT concerns. Defer to PT for therapy.

## 2024-05-31 NOTE — H&P
[facility]    History and Physical - Hospitalist Service       Date of Admission:  5/30/2024    Assessment & Plan      Ian Crowley is a 38 year old male admitted on 5/30/2024. He was recently admitted to [facility] for alcoholic hepatitis, ascites, SBP for 8 days during which she was treated with IV antibiotics and discharged on diuretics and ciprofloxacin for SBP prophylaxis.  He returned back to to [facility] for multiple symptoms as outlined below.  His hemoglobin today 7.4 which is not much different from the hemoglobin of 7.9 when he was discharged but he does endorse bright red blood per rectum.  CT of the abdomen pelvis shows hepatosplenomegaly with portal hypertension and paraesophageal varices.  GI has been consulted.  Will continue to monitor hemoglobin and transfuse hemoglobin less than 7.  Suspect overdiuresis causing GERTRUDIS and therefore will hold Lasix and give 25 g of albumin.  This would also improve his dizziness.  Likely his abdominal pain from stretching of the liver capsule by alcoholic liver disease.  Lower back pain is unclear whether related to UTI versus musculoskeletal.  Order UA to differentiate the same although it might be falsely negative due to ciprofloxacin.  Patient is constipated and  has asterixis and therefore will start lactulose.  MELD NA score of 28  Defer use of corticosteroids to GI consult    Worsening ascites  GERTRUDIS due to overdiuresis/hepatorenal syndrome  --Cause is unclear could be due to dietary and fluid indiscretion  --Ordered low-sodium diet and fluid restriction  --Order 25 g of albumin once  --Underwent paracentesis with removal of 1.85 L of fluid.  May repeat therapeutic paracentesis if continues to be an issue  --Absolute neutrophil count is less than 250 and therefore low suspicion for SBP  --Continue Cipro for SBP prophylaxis  -- Continue spironolactone but hold Lasix    Hyponatremia due to hypervolemia  -- Check CMP  next a.m.    Anemia likely due to rectal bleeding from possible varices secondary to portal hypertension/liver cirrhosis  --GI is consulted for upper and lower endoscopy  --Ordered hemoglobin checks every 12 hours  --Transfuse if hemoglobin less than 7  -- Hold iron supplementation in the hospital  -- Order p.o. PPI    Hepatic encephalopathy and constipation  --Order lactulose 10 g twice daily with a goal to have at least 2 bowel movements a day  --Patient explained importance of that    Leukocytosis likely due to alcoholic hepatitis  --Trending down    Alcoholic liver cirrhosis with portal hypertension with esophageal and possible rectal varices  --Splenomegaly is likely due to portal hypertension  --MELD NA score of 28  --GI consulted and defer use of corticosteroids per GI  -- Patient has been sober since 6/20  -- Ordered magnesium level    Coagulopathy and jaundice secondary to liver disease  --Anticipate improvement with improvement in liver disease  --Follow-up with liver clinic as outpatient  -- Ordered vitamin K 5 mg once daily x 5 doses    Lower back pain  --Musculoskeletal versus UTI  --Order UA  -Ordered lidocaine patch  --Patient is at risk for narcotic abuse and therefore refrain from using narcotics  --Cannot give NSAIDs and Tylenol due to liver disease  --Ordered tramadol    Smoker and marijuana use  --He also 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  Please refrain from using narcotics    Dizziness likely due to diuresis and intravascular depletion  --Hold Lasix and order albumin  --Patient explained the pathophysiology of liver disease  --Fall precautions        Diet: Combination Diet 2 gm NA Diet  Fluid restriction 1500 ML FLUID    DVT Prophylaxis: VTE Prophylaxis contraindicated due to GI bleed  Constantino Catheter: Not present  Lines: None     Cardiac Monitoring: None  Code Status:  Full    Clinically Significant Risk Factors Present on Admission               # Coagulation Defect: INR  "= 1.92 (Ref range: 0.85 - 1.15) and/or PTT = 46 Seconds (Ref range: 22 - 38 Seconds), will monitor for bleeding         # Obesity: Estimated body mass index is 33.18 kg/m  as calculated from the following:    Height as of this encounter: 1.778 m (5' 10\").    Weight as of this encounter: 104.9 kg (231 lb 4.2 oz).              Disposition Plan     Medically Ready for Discharge: Anticipated in 2-4 Days           Rachid Cabrera MD  Hospitalist Service  [facility]  Securely message with [employer] (more info)  Text page via [facility] Paging/Directory     ______________________________________________________________________    Chief Complaint   Multiple complaints including lower back pain, decreased appetite, distended abdomen, abdominal pain while moving, bright red blood per rectum, dizziness lasting 30 to 40 seconds on standing    History is obtained from the patient, and ER notes  History of Present Illness   Ian Crowley is a 38 year old male with history of alcohol abuse, smoker and marijuana use who was discharged from [facility] on 22 May after spending 8 days for bacterial peritonitis, severe anemia and alcoholic hepatitis/liver cirrhosis.  He was treated with Rocephin and Flagyl.  His hemoglobin at discharge was 7.8.  He was transfused and discharged on iron supplementation.  Plan was to do outpatient upper endoscopy and colonoscopy.  He was also getting Aldactone and Lasix for ascites and taking daily Cipro for SBP prophylaxis.  Patient claims that he did not miss his medication and did not drink alcohol.  He complained of increasing abdominal girth and abdominal pain also complained of lower back pain just above his buttocks in the sacral area.  He felt lightheaded and dizzy which lasted 30 to 40 seconds on standing from supine position.  He also has loss of appetite, dark black stools and darker urine.  Patient does drink about a liter and a half of fluids at home  He did " receive IV Dilaudid in the ER.  Underwent paracentesis with removal of around 1.8 L of fluid.  Patient is feeling better after the removal of fluid.  CT of the abdomen pelvis shows hepatosplenomegaly with ascites and paraesophageal varices.  Patient did smoke marijuana yesterday.      Past Medical History    Past Medical History:   Diagnosis Date    Ascites due to alcoholic cirrhosis (H)     Hidradenitis suppurativa        Past Surgical History   Past Surgical History:   Procedure Laterality Date    COLONOSCOPY N/A 11/4/2020    Procedure: COLONOSCOPY, WITH POLYPECTOMY;  Surgeon: Edgar De La Rosa DO;  Location: [facility] OR    IR PARACENTESIS  6/26/2020    IR PARACENTESIS  6/30/2020    IR PARACENTESIS  5/20/2024    IR PARACENTESIS  5/14/2024    IR PARACENTESIS  5/17/2024    IR PARACENTESIS  6/11/2020    US PARACENTESIS  6/24/2020    US PARACENTESIS  8/7/2020    US PARACENTESIS  10/2/2020    US PARACENTESIS WITH ALBUMIN  8/12/2020    US PARACENTESIS WITH ALBUMIN  8/20/2020    US PARACENTESIS WITH ALBUMIN  8/28/2020    US PARACENTESIS WITH ALBUMIN  9/3/2020    US PARACENTESIS WITH ALBUMIN  11/5/2020       Prior to Admission Medications   Prior to Admission Medications   Prescriptions Last Dose Informant Patient Reported? Taking?   ciprofloxacin (CIPRO) 500 MG tablet 5/30/2024 at am  Yes Yes   Sig: Take 500 mg by mouth daily   ferrous sulfate (FEROSUL) 325 (65 Fe) MG tablet 5/30/2024 at pm  Yes Yes   Sig: Take 325 mg by mouth every other day   furosemide (LASIX) 20 MG tablet 5/30/2024 at am  Yes Yes   Sig: Take 20 mg by mouth daily   spironolactone (ALDACTONE) 50 MG tablet 5/30/2024 at am  Yes Yes   Sig: Take 50 mg by mouth daily   traZODone (DESYREL) 50 MG tablet 5/29/2024 at hs  Yes Yes   Sig: Take 1 tablet by mouth at bedtime      Facility-Administered Medications: None        Social History   I have reviewed this patient's social history and updated it with pertinent information if needed.  Social History     Tobacco Use     Smoking status: Every Day     Current packs/day: 0.40     Average packs/day: 0.4 packs/day for 14.0 years (5.6 ttl pk-yrs)     Types: Cigarettes     Start date: 6/1/2010     Passive exposure: Current    Smokeless tobacco: Never   Vaping Use    Vaping status: Never Used   Substance Use Topics    Alcohol use: Not Currently     Comment: Alcoholic Drinks/day: last drink 6/20    Drug use: Yes     Types: Marijuana, Hydromorphone        Physical Exam   Vital Signs: Temp: 98  F (36.7  C) Temp src: Oral BP: 99/55 Pulse: 71   Resp: 16 SpO2: 98 % O2 Device: None (Room air)    Weight: 231 lbs 4.2 oz  Dry sallow skin  Deeply icteric--sclerae and conjunctiva  Asterixis plus  Abdomen distended  Pressure over the lower back and sacral area does not elicit pain  No leg swelling      Medical Decision Making       75 MINUTES SPENT BY ME on the date of service doing chart review, history, exam, documentation & further activities per the note.  MANAGEMENT DISCUSSED with the following over the past 24 hours: Patient   NOTE(S)/MEDICAL RECORDS REVIEWED over the past 24 hours: PCP, ER notes, discharge summary from [facility]       Data     I have personally reviewed the following data over the past 24 hrs:    11.4 (H)  \   7.4 (L)   / 159     134 (L) 102 18.0 /  98   3.7 21 (L) 1.09 \     ALT: 14 AST: 81 (H) AP: 279 (H) TBILI: 13.9 (H)   ALB: 3.7 TOT PROTEIN: 7.4 LIPASE: 39     INR:  1.92 (H) PTT:  46 (H)   D-dimer:  N/A Fibrinogen:  N/A       Imaging results reviewed over the past 24 hrs:   Recent Results (from the past 24 hour(s))   US Paracentesis without Albumin    Narrative    EXAM:  1. PARACENTESIS  2. ULTRASOUND GUIDANCE  LOCATION: [facility]  DATE: 5/30/2024    INDICATION: Ascites.    PROCEDURE: Informed consent obtained. Time out performed. The abdomen was prepped and draped in a sterile fashion. 10 mL of 1% lidocaine was infused into local soft tissues. A 5 [place] catheter system was introduced into  the abdominal ascites under   ultrasound guidance. Note that the procedure was performed by Dr. Jon Phalen. Per the request of the patient's medical team, I dictated this report in lieu of Dr. Nunes.    1850 liters of brown-yellow fluid were removed and sent to lab if requested.    Patient tolerated procedure well.    Ultrasound imaging was obtained and placed in the patient's permanent medical record.      Impression    IMPRESSION:  1.  Status post ultrasound-guided paracentesis.    Reference CPT Code: 73824   CTA Abdomen Pelvis with Contrast    Narrative    EXAM: CTA ABDOMEN PELVIS WITH CONTRAST  LOCATION: [facility]  DATE: 5/30/2024    INDICATION: Hematochezia, jaundice, cirrhosis  COMPARISON: CT 5/12/2024 and 7/6/2020  TECHNIQUE: CT angiogram abdomen pelvis during arterial phase of injection of IV contrast. 2D and 3D MIP reconstructions were performed by the CT technologist. Dose reduction techniques were used.  CONTRAST: isovue 370 90ml    FINDINGS:  ANGIOGRAM ABDOMEN/PELVIS: No hyperdense fluid or active contrast extravasation within the stomach, small bowel, colon or rectum.    LOWER CHEST: Paraesophageal varices. Mild coronary artery calcifications.    HEPATOBILIARY: Mildly enlarged liver with a subtle nodular surface contour. No focal liver lesion. Cholelithiasis. No gallbladder wall thickening. No biliary ductal dilatation.    PANCREAS: Normal.    SPLEEN: Moderately enlarged, measuring 18.4 cm craniocaudal dimension.    ADRENAL GLANDS: Normal.    KIDNEYS/BLADDER: Normal.    BOWEL: Normal small bowel and appendix. Mild colonic stool burden. Minimal colonic diverticulosis. Trace ascites. Diffuse mesenteric edema. No free air.    LYMPH NODES: Stable prominent likely reactive upper abdominal lymph nodes which measure less than 10 mm short axis.    PELVIC ORGANS: Normal.    MUSCULOSKELETAL: Normal.      Impression    IMPRESSION:  1.  No evidence for active gastrointestinal tract  bleeding.  2.  Cirrhosis with sequelae of portal venous hypertension including trace ascites, paraesophageal varices and moderate splenomegaly.

## 2024-05-31 NOTE — PLAN OF CARE
Problem: Pain Acute  Goal: Optimal Pain Control and Function  Outcome: Progressing  Intervention: Develop Pain Management Plan  Recent Flowsheet Documentation  Taken 5/31/2024 0842 by Zollinger, Nancy K, RN  Pain Management Interventions: medication (see MAR)  Intervention: Prevent or Manage Pain  Recent Flowsheet Documentation  Taken 5/31/2024 0900 by Zollinger, Nancy K, RN  Medication Review/Management: medications reviewed   Goal Outcome Evaluation:  Patient complaining of 8/10 abdominal and lower back pain.  Taking tramadol for pain relief.  Continues on 1500cc fluid restriction.  Had one loose stool.  Independent in room.

## 2024-05-31 NOTE — CONSULTS
Care Management Initial Consult    General Information  Assessment completed with: Patient Ian  Type of CM/SW Visit: Initial Assessment    Primary Care Provider verified and updated as needed: Yes   Readmission within the last 30 days: no previous admission in last 30 days         Advance Care Planning: Advance Care Planning Reviewed: other (see comments) (No ACP documents)          Communication Assessment  Patient's communication style: spoken language (English or Bilingual)    Hearing Difficulty or Deaf: no   Wear Glasses or Blind: no    Cognitive  Cognitive/Neuro/Behavioral: WDL                      Living Environment:   People in home: parent(s), sibling(s)  Mom Bianca, and sister Keesha  Current living Arrangements: apartment, house (Pt usually stay in his own apartment, but currently staying at moms home.)      Able to return to prior arrangements: yes       Family/Social Support:  Care provided by: self, parent(s)  Provides care for: no one  Marital Status: Single  Parent(s), Sibling(s)          Description of Support System: Supportive         Current Resources:   Patient receiving home care services: No     Community Resources: None  Equipment currently used at home: none  Supplies currently used at home:      Employment/Financial:  Employment Status: unemployed        Financial Concerns: unemployed   Referral to Financial Worker: No (Pt has insurance listed)       Does the patient's insurance plan have a 3 day qualifying hospital stay waiver?  No    Lifestyle & Psychosocial Needs:  Social Determinants of Health     Food Insecurity: No Food Insecurity (5/12/2024)    Received from [employer]    Food Insecurity     Worried About Running Out of Food in the Last Year: 1   Depression: At risk (5/30/2024)    PHQ-2     PHQ-2 Score: 3   Housing Stability: Low Risk  (5/12/2024)    Received from [employer]    Housing Stability     Unable to Pay  for Housing in the Last Year: 1   Tobacco Use: High Risk (5/30/2024)    Patient History     Smoking Tobacco Use: Every Day     Smokeless Tobacco Use: Never     Passive Exposure: Current   Financial Resource Strain: Medium Risk (5/13/2024)    [facility]    Financial Resource Strain     Difficulty of Paying Living Expenses: 1     Difficulty of Paying Living Expenses: 2   Alcohol Use: Not on file   Transportation Needs: No Transportation Needs (5/12/2024)    [facility]    Transportation Needs     Lack of Transportation (Medical): 1   Physical Activity: Not on file   Interpersonal Safety: Not on file   Stress: Not on file   Social Connections: Socially Integrated (5/12/2024)    [facility]    Social Connections     Frequency of Communication with Friends and Family: 0   Health Literacy: Not on file       Functional Status:  Prior to admission patient needed assistance:   Dependent ADLs:: Independent  Dependent IADLs:: Independent, Money Management       Mental Health Status:          Chemical Dependency Status:                Values/Beliefs:  Spiritual, Cultural Beliefs, Holiness Practices, Values that affect care:                 Additional Information:  Assessed. RNCM introduced self and CM role to pt. Pt lives an apartment alone usually, the last month pt has been staying with his mom Bianca in her home, with his sister Keesha. Pt Ind with ADLS usually, has been more weak lately, but can manage. Pt Ind with most IADLS, but last month lost his job and  currently does not have income coming in. Pt says he can't qualify for unemployment. Pts mom has been assisting him with finances. Pt does drive. States having insurance, [facility] pmap plan listed. Has established PCP. No home care svcs prior. No mobility aides. Denies the use of drugs/alch.  No therapy consults at this time. Family to  transport.         Cm will continue to follow plan of care,review recommendations, and assist with any discharge needs anticipated.       Monse Martinez RN

## 2024-06-01 VITALS
OXYGEN SATURATION: 98 % | WEIGHT: 200.7 LBS | BODY MASS INDEX: 28.73 KG/M2 | HEIGHT: 70 IN | TEMPERATURE: 98.3 F | HEART RATE: 68 BPM | DIASTOLIC BLOOD PRESSURE: 57 MMHG | RESPIRATION RATE: 16 BRPM | SYSTOLIC BLOOD PRESSURE: 105 MMHG

## 2024-06-01 LAB
ALBUMIN SERPL BCG-MCNC: 3.2 G/DL (ref 3.5–5.2)
ALP SERPL-CCNC: 267 U/L (ref 40–150)
ALT SERPL W P-5'-P-CCNC: 12 U/L (ref 0–70)
ANION GAP SERPL CALCULATED.3IONS-SCNC: 11 MMOL/L (ref 7–15)
AST SERPL W P-5'-P-CCNC: 62 U/L (ref 0–45)
BILIRUB SERPL-MCNC: 11.6 MG/DL
BUN SERPL-MCNC: 12.8 MG/DL (ref 6–20)
CALCIUM SERPL-MCNC: 8.8 MG/DL (ref 8.6–10)
CHLORIDE SERPL-SCNC: 102 MMOL/L (ref 98–107)
CREAT SERPL-MCNC: 0.95 MG/DL (ref 0.67–1.17)
DEPRECATED HCO3 PLAS-SCNC: 21 MMOL/L (ref 22–29)
EGFRCR SERPLBLD CKD-EPI 2021: >90 ML/MIN/1.73M2
ERYTHROCYTE [DISTWIDTH] IN BLOOD BY AUTOMATED COUNT: 21.7 % (ref 10–15)
GLUCOSE SERPL-MCNC: 108 MG/DL (ref 70–99)
HCT VFR BLD AUTO: 25.6 % (ref 40–53)
HGB BLD-MCNC: 8.3 G/DL (ref 13.3–17.7)
HGB BLD-MCNC: 8.3 G/DL (ref 13.3–17.7)
MCH RBC QN AUTO: 28.6 PG (ref 26.5–33)
MCHC RBC AUTO-ENTMCNC: 32.4 G/DL (ref 31.5–36.5)
MCV RBC AUTO: 88 FL (ref 78–100)
PLATELET # BLD AUTO: 156 10E3/UL (ref 150–450)
POTASSIUM SERPL-SCNC: 3.6 MMOL/L (ref 3.4–5.3)
PROT SERPL-MCNC: 6.7 G/DL (ref 6.4–8.3)
RBC # BLD AUTO: 2.9 10E6/UL (ref 4.4–5.9)
SODIUM SERPL-SCNC: 134 MMOL/L (ref 135–145)
WBC # BLD AUTO: 11 10E3/UL (ref 4–11)

## 2024-06-01 PROCEDURE — 36415 COLL VENOUS BLD VENIPUNCTURE: CPT | Performed by: HOSPITALIST

## 2024-06-01 PROCEDURE — 99239 HOSP IP/OBS DSCHRG MGMT >30: CPT | Performed by: HOSPITALIST

## 2024-06-01 PROCEDURE — 250N000013 HC RX MED GY IP 250 OP 250 PS 637: Performed by: HOSPITALIST

## 2024-06-01 PROCEDURE — 250N000013 HC RX MED GY IP 250 OP 250 PS 637: Performed by: INTERNAL MEDICINE

## 2024-06-01 PROCEDURE — 85027 COMPLETE CBC AUTOMATED: CPT | Performed by: HOSPITALIST

## 2024-06-01 PROCEDURE — 80053 COMPREHEN METABOLIC PANEL: CPT | Performed by: HOSPITALIST

## 2024-06-01 RX ORDER — TRAMADOL HYDROCHLORIDE 50 MG/1
50 TABLET ORAL EVERY 6 HOURS PRN
Qty: 5 TABLET | Refills: 0 | Status: SHIPPED | OUTPATIENT
Start: 2024-06-01

## 2024-06-01 RX ORDER — PANTOPRAZOLE SODIUM 40 MG/1
40 TABLET, DELAYED RELEASE ORAL
Status: DISCONTINUED | OUTPATIENT
Start: 2024-06-02 | End: 2024-06-01 | Stop reason: HOSPADM

## 2024-06-01 RX ORDER — LACTULOSE 10 G/15ML
10 SOLUTION ORAL 2 TIMES DAILY
Qty: 900 ML | Refills: 0 | Status: SHIPPED | OUTPATIENT
Start: 2024-06-01 | End: 2024-07-01

## 2024-06-01 RX ADMIN — LACTULOSE 10 G: 10 SOLUTION ORAL at 09:04

## 2024-06-01 RX ADMIN — NICOTINE 1 PATCH: 14 PATCH, EXTENDED RELEASE TRANSDERMAL at 09:04

## 2024-06-01 RX ADMIN — CIPROFLOXACIN HYDROCHLORIDE 500 MG: 500 TABLET, FILM COATED ORAL at 06:24

## 2024-06-01 RX ADMIN — TRAMADOL HYDROCHLORIDE 50 MG: 50 TABLET, COATED ORAL at 06:24

## 2024-06-01 RX ADMIN — SPIRONOLACTONE 50 MG: 25 TABLET, FILM COATED ORAL at 09:04

## 2024-06-01 ASSESSMENT — ACTIVITIES OF DAILY LIVING (ADL)
ADLS_ACUITY_SCORE: 20

## 2024-06-01 NOTE — PLAN OF CARE
Problem: Adult Inpatient Plan of Care  Goal: Plan of Care Review  Description: The Plan of Care Review/Shift note should be completed every shift.  The Outcome Evaluation is a brief statement about your assessment that the patient is improving, declining, or no change.  This information will be displayed automatically on your shift  note.  Outcome: Progressing     Problem: Pain Acute  Goal: Optimal Pain Control and Function  Outcome: Progressing  Intervention: Develop Pain Management Plan  Recent Flowsheet Documentation  Taken 5/31/2024 2119 by Lillian Donahue, RN  Pain Management Interventions: emotional support  Taken 5/31/2024 1716 by Lillian Donahue, RN  Pain Management Interventions: medication (see MAR)  Intervention: Optimize Psychosocial Wellbeing  Recent Flowsheet Documentation  Taken 5/31/2024 1700 by Lillian Donahue, RN  Supportive Measures: active listening utilized   Goal Outcome Evaluation:     Pt is a/o x4, independent in his room, c/o abdominal pain 8/10 and was given PRN Tramadol 50 mg x1. He also had an emesis (medium) clear fluid, PRN Zofran was given and was helpful.

## 2024-06-01 NOTE — DISCHARGE SUMMARY
[facility] MEDICINE  DISCHARGE SUMMARY     Primary Care Physician: Serg Wagner  Admission Date: 5/30/2024   Discharge Provider: Laurence Valerio MD Discharge Date: 6/1/2024   Diet:   Active Diet and Nourishment Order   Procedures    Fluid restriction 1500 ML FLUID    Combination Diet 2 gm NA Diet    Diet       Code Status: Full Code   Activity: DCACTIVITY: Activity as tolerated        Condition at Discharge: Stable     REASON FOR PRESENTATION(See Admission Note for Details)     Decompensated liver failure, worsening ascites    PRINCIPAL & ACTIVE DISCHARGE DIAGNOSES     Active Problems:    Other ascites    Anemia, unspecified type      PENDING LABS     Unresulted Labs Ordered in the Past 30 Days of this Admission       Date and Time Order Name Status Description    5/31/2024  3:51 PM AFP tumor marker In process     5/30/2024  3:10 PM Ascites Fluid Aerobic Bacterial Culture Routine With Gram Stain Preliminary               PROCEDURES ( this hospitalization only)          RECOMMENDATIONS TO OUTPATIENT PROVIDER FOR F/U VISIT     Follow-up Appointments     Follow-up and recommended labs and tests       Follow up with primary care provider, Serg Wagner, within 7 days for   hospital follow- up.  The following labs/tests are recommended: CBC, CMP.      Follow up with Hepatologist in 1-2 weeks  Follow up with [facility] in 1 week, needs out-pt EGD              DISPOSITION     Home    SUMMARY OF HOSPITAL COURSE:    Ian Crowley is a 38 year old male admitted on 5/30/2024. He was recently admitted to [facility] for alcoholic hepatitis, ascites, SBP for 8 days during which he was treated with IV antibiotics and discharged on diuretics and ciprofloxacin for SBP prophylaxis.  He returned back to to [facility] for multiple symptoms workup revealed hemoglobin of 7.4 on admission which is close to his baseline. CT of the abdomen pelvis shows hepatosplenomegaly with  portal hypertension and paraesophageal varices.  Also labs revealed GERTRUDIS likely secondary to overdiuresis.  MELD NA score of 28.       Worsening ascites  GERTRUDIS due to overdiuresis/hepatorenal syndrome - resolved  --Cause is unclear could be due to dietary and fluid indiscretion  --On low-sodium diet and fluid restriction  --S/p 25 g of albumin X1  --S/p paracentesis with removal of 1.85 L of fluid.  May repeat therapeutic paracentesis if continues to be an issue  --Absolute neutrophil count is less than 250 and therefore low suspicion for SBP  --Continue Cipro for SBP prophylaxis  --Continue spironolactone  and lasix on discharge     Hyponatremia due to hypervolemia - resolved     Anemia likely due to rectal bleeding from possible varices secondary to portal hypertension/liver cirrhosis  --Baseline Hb around 9, 8.3 on discharge  --Continue PPI  -- Patient was evaluated by GI, recommended endoscopy intervention but patient declined.  GI recommended this to be done as outpatient.     Hepatic encephalopathy and constipation  -- Added on oral lactulose with goal of 2-3 bowel movements in 24 hours.        Leukocytosis likely due to alcoholic hepatitis -resolved     Alcoholic liver cirrhosis with portal hypertension with esophageal and possible rectal varices  --Splenomegaly is likely due to portal hypertension  --MELD NA score of 28  --Patient has been sober since 6/20  -- In by GI, recommended to follow-up as outpatient in [facility] and hepatology clinic.        Coagulopathy and jaundice secondary to liver disease  --Anticipate improvement with improvement in liver disease  --Follow-up with liver clinic as outpatient  --S/p vitamin K 5 mg once daily x 1 doses     Lower back pain likely musculoskeletal   --UA not s/o UTI  --Patient is at risk for narcotic abuse and therefore refrain from using narcotics  --Cannot give NSAIDs and Tylenol due to liver disease. 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  Please refrain from using narcotics     Dizziness likely due to diuresis and intravascular depletion - improved  --Lasix was on hold, resumed on discharge.  Patient received albumin.  --Fall precautions  --PT/OT consulted, patient declined.    .Patient stable to be discharged home today. Please refer to discharge medications and instructions for more details.           Discharge Medications with Med changes:     Current Discharge Medication List        START taking these medications    Details   lactulose (CHRONULAC) 10 GM/15ML solution Take 15 mLs (10 g) by mouth 2 times daily for 30 days  Qty: 900 mL, Refills: 0    Associated Diagnoses: Alcoholic cirrhosis of liver with ascites (H)      omeprazole (PRILOSEC) 20 MG DR capsule Take 1 capsule (20 mg) by mouth daily for 30 days  Qty: 30 capsule, Refills: 0    Associated Diagnoses: Alcoholic cirrhosis of liver with ascites (H)      traMADol (ULTRAM) 50 MG tablet Take 1 tablet (50 mg) by mouth every 6 hours as needed for severe pain  Qty: 5 tablet, Refills: 0    Associated Diagnoses: Other ascites           CONTINUE these medications which have NOT CHANGED    Details   ciprofloxacin (CIPRO) 500 MG tablet Take 500 mg by mouth daily      ferrous sulfate (FEROSUL) 325 (65 Fe) MG tablet Take 325 mg by mouth every other day      furosemide (LASIX) 20 MG tablet Take 20 mg by mouth daily      spironolactone (ALDACTONE) 50 MG tablet Take 50 mg by mouth daily      traZODone (DESYREL) 50 MG tablet Take 1 tablet by mouth at bedtime                   Rationale for medication changes:      See med rec's        Consults       GASTROENTEROLOGY IP CONSULT  CARE MANAGEMENT / SOCIAL WORK IP CONSULT  PHYSICAL THERAPY ADULT IP CONSULT  OCCUPATIONAL THERAPY ADULT IP CONSULT    Immunizations given this encounter     Most Recent Immunizations   Administered Date(s) Administered    COVID-19 MONOVALENT 12+ (Pfizer) 09/16/2021     Hepatitis B, Adult 06/15/2020           Anticoagulation Information      Recent INR results:   Recent Labs   Lab 05/31/24  1010 05/30/24  1439   INR 1.77* 1.92*     Warfarin doses (if applicable) or name of other anticoagulant: Not applicable      SIGNIFICANT IMAGING FINDINGS     Results for orders placed or performed during the hospital encounter of 05/30/24   CTA Abdomen Pelvis with Contrast    Impression    IMPRESSION:  1.  No evidence for active gastrointestinal tract bleeding.  2.  Cirrhosis with sequelae of portal venous hypertension including trace ascites, paraesophageal varices and moderate splenomegaly.     US Paracentesis without Albumin    Impression    IMPRESSION:  1.  Status post ultrasound-guided paracentesis.    Reference CPT Code: 23611       SIGNIFICANT LABORATORY FINDINGS         Discharge Orders        Reason for your hospital stay    Decompensated liver cirrhosis     Follow-up and recommended labs and tests     Follow up with primary care provider, Serg Wagner, within 7 days for hospital follow- up.  The following labs/tests are recommended: CBC, CMP.    Follow up with Hepatologist in 1-2 weeks  Follow up with [facility] in 1 week, needs out-pt EGD     Activity    Your activity upon discharge: activity as tolerated     Diet    Follow this diet upon discharge: Orders Placed This Encounter      Fluid restriction 1500 ML FLUID      Combination Diet 2 gm NA Diet, high protein diet       Examination   Physical Exam   Temp:  [97.9  F (36.6  C)-99  F (37.2  C)] 98.3  F (36.8  C)  Pulse:  [61-68] 68  Resp:  [16-18] 16  BP: (105-114)/(57-67) 105/57  SpO2:  [97 %-98 %] 98 %  Wt Readings from Last 1 Encounters:   06/01/24 91 kg (200 lb 11.2 oz)       General: Pleasant, male in NAD  HEENT:EOMI, scleral icterus  CVS:RRR, trace LE edema  RS:CTAB  Abd: Soft, distended  Neurology:Grossly normal  Skin: Yellowish discoloration all over  Psy:Approrpiate affect      Please see EMR for more detailed significant labs,  imaging, consultant notes etc.    I, Laurence Valerio MD, personally saw the patient today and spent greater than 30 minutes discharging this patient.    Laurence Valerio MD  [facility]    CC:Serg Wagner

## 2024-06-01 NOTE — PROGRESS NOTES
Patient discharging to home.  Patient family at bedside.  Patient has no questions regarding discharge instructions.  Patient transported to [place] campus via w/c.  Patient belongings sent with patient. Patient will  medications at [facility] pharmacy.

## 2024-06-01 NOTE — PROGRESS NOTES
Care Management Discharge Note    Discharge Date: 06/01/2024       Discharge Disposition: Home    Discharge Services:  Home     Discharge DME:  None     Discharge Transportation: family or friend will provide    Private pay costs discussed: Not applicable    Does the patient's insurance plan have a 3 day qualifying hospital stay waiver?  No    PAS Confirmation Code:  NA  Patient/family educated on Medicare website which has current facility and service quality ratings:  Yes    Education Provided on the Discharge Plan:  Yes  Persons Notified of Discharge Plans: Yes  Patient/Family in Agreement with the Plan:  Yes    Handoff Referral Completed: Yes    Additional Information:    Final discharge plan is for pt to go home with family support and OP follow up. Family to transport.       Monse Martinez RN

## 2024-06-01 NOTE — PROGRESS NOTES
"    GASTROENTEROLOGY PROGRESS NOTE       SUBJECTIVE   Patient was seen in his room.  He says that he noticed minimal blood in stools today.     OBJECTIVE     Vitals /57 (BP Location: Left arm, Patient Position: Left side, Cuff Size: Adult Regular)   Pulse 68   Temp 98.3  F (36.8  C) (Oral)   Resp 16   Ht 1.778 m (5' 10\")   Wt 91 kg (200 lb 11.2 oz)   SpO2 98%   BMI 28.80 kg/m          PHYSICAL EXAM:  General:   Patient is lying in bed in no acute distress.   Head:  Atraumatic, normocephalic   Eyes:   Conjunctivae normal, scleral icterus present. Extraocular movements intact. Pupils equal, round, and reactive to light.   Ears:  Hearing grossly intact   Mouth:  Oral mucosa moist, normal. No ulcers.   Neck:   Supple, no carotid bruits or elevated JVP. Thyroid not palpable. No cervical lymphadenopathy. Trachea midline.   Chest:  Clear to auscultation bilaterally. No wheezes, rales or rhonchi. Bilateral equal air entry. Normal respiratory effort. No cyanosis.   Heart/vasc:  S1, S2 heard normal. No murmurs, gallops or rubs. All peripheral pulses palpable and symmetric.   Abdomen:   Soft, non-distended, non-tender. No peritoneal signs. No organomegaly. No shifting dullness or ascites. Bowel sounds are normal.          LABORATORY AND IMAGING   ELECTROLYTE PANEL   Recent Labs   Lab Test 06/01/24  0747 05/31/24  0626 05/30/24  1616   POTASSIUM 3.6 3.7 3.7   CHLORIDE 102 103 102   CO2 21* 21* 21*   BUN 12.8 17.4 18.0   ANIONGAP 11 12 11        HEMATOLOGY PANEL   Recent Labs   Lab Test 06/01/24  0747 05/31/24  1802 05/31/24  1010 05/31/24  0626 05/30/24  1616 05/30/24  1439 10/02/20  1409 10/02/20  1408   WBC 11.0  --   --   --  11.4* 14.8*   < >  --    HGB 8.3*  8.3* 8.4*  --  8.2* 7.4* 9.0*   < >  --    MCV 88  --   --   --  89 89   < >  --      --   --   --  159 203   < >  --    INR  --   --  1.77*  --   --  1.92*  --  1.28*    < > = values in this interval not displayed.        LIVER AND PANCREAS PANEL "   Recent Labs   Lab Test 06/01/24  0747 05/31/24  0626 05/30/24  2322 05/30/24  1439 10/02/20  1408 08/06/20  1841 07/06/20  2247 06/26/20  0455 06/25/20  2000 06/25/20  0628 06/24/20  1340   ALBUMIN 3.2* 3.2*  --  3.7 3.1*   < >  --    < >  --    < > 1.8*   BILITOTAL 11.6* 11.8*  --  13.9* 0.7   < >  --    < >  --    < > 26.7*   ALT 12 11  --  14 13   < >  --    < >  --    < > 25   AST 62* 61*  --  81* 50*   < >  --    < >  --    < > 106*   PROTEIN  --   --  Negative  --   --   --  10*  --  30*  --   --    LIPASE  --   --   --  39 135*  --   --   --   --   --  42    < > = values in this interval not displayed.        I have reviewed the current diagnostic and laboratory tests.     Current medications:    Current Facility-Administered Medications:     calcium carbonate (TUMS) chewable tablet 1,000 mg, 1,000 mg, Oral, 4x Daily PRN, Rachid Cabrera MD    ciprofloxacin (CIPRO) tablet 500 mg, 500 mg, Oral, Daily, Rachid Cabrera MD, 500 mg at 06/01/24 0624    lactulose (CHRONULAC) solution 10 g, 10 g, Oral, BID, Rachid Cabrera MD, 10 g at 06/01/24 0904    Lidocaine (LIDOCARE) 4 % Patch 1 patch, 1 patch, Transdermal, Daily PRN, Laurence Valerio MD    lidocaine (LMX4) cream, , Topical, Q1H PRN, Rachid Cabrera MD    lidocaine 1 % 0.1-1 mL, 0.1-1 mL, Other, Q1H PRN, Rachid Cabrera MD    melatonin tablet 5 mg, 5 mg, Oral, At Bedtime PRN, Rachid Cabrera MD, 5 mg at 05/31/24 4047    naloxone (NARCAN) injection 0.2 mg, 0.2 mg, Intravenous, Q2 Min PRN **OR** naloxone (NARCAN) injection 0.4 mg, 0.4 mg, Intravenous, Q2 Min PRN **OR** naloxone (NARCAN) injection 0.2 mg, 0.2 mg, Intramuscular, Q2 Min PRN **OR** naloxone (NARCAN) injection 0.4 mg, 0.4 mg, Intramuscular, Q2 Min PRN, Rachid Cabrera MD    nicotine (NICODERM CQ) 14 MG/24HR 24 hr patch 1 patch, 1 patch, Transdermal, Daily, Laurence Valerio MD, 1 patch at 06/01/24 0904    ondansetron (ZOFRAN ODT) ODT tab 4 mg, 4 mg, Oral, Q6H PRN **OR** ondansetron (ZOFRAN) injection 4 mg, 4  mg, Intravenous, Q6H PRN, Rachid Cabrera MD, 4 mg at 05/31/24 1541    [START ON 6/2/2024] pantoprazole (PROTONIX) EC tablet 40 mg, 40 mg, Oral, QAM AC, David Bardales MD    phytonadione (MEPHYTON/VITAMIN K) 1 MG/ML oral solution 5 mg, 5 mg, Oral, QPM, Rachid Cabrera MD, 5 mg at 05/31/24 2115    prochlorperazine (COMPAZINE) injection 10 mg, 10 mg, Intravenous, Q6H PRN **OR** prochlorperazine (COMPAZINE) tablet 10 mg, 10 mg, Oral, Q6H PRN **OR** prochlorperazine (COMPAZINE) suppository 25 mg, 25 mg, Rectal, Q12H PRN, Rachid Cabrera MD    sodium chloride (PF) 0.9% PF flush 3 mL, 3 mL, Intracatheter, Q8H, Rachid Cabrera MD, 3 mL at 05/31/24 2118    sodium chloride (PF) 0.9% PF flush 3 mL, 3 mL, Intracatheter, q1 min prn, Rachid Cabrera MD, 3 mL at 05/30/24 2137    spironolactone (ALDACTONE) tablet 50 mg, 50 mg, Oral, Daily, Rachid Cabrera MD, 50 mg at 06/01/24 0904    traMADol (ULTRAM) tablet 50 mg, 50 mg, Oral, Q6H PRN, Rachid Cabrera MD, 50 mg at 06/01/24 0624    traZODone (DESYREL) tablet 50 mg, 50 mg, Oral, At Bedtime, Rachid Cabrera MD, 50 mg at 05/31/24 2114    Current Outpatient Medications:     ciprofloxacin (CIPRO) 500 MG tablet, Take 500 mg by mouth daily, Disp: , Rfl:     ferrous sulfate (FEROSUL) 325 (65 Fe) MG tablet, Take 325 mg by mouth every other day, Disp: , Rfl:     furosemide (LASIX) 20 MG tablet, Take 20 mg by mouth daily, Disp: , Rfl:     lactulose (CHRONULAC) 10 GM/15ML solution, Take 15 mLs (10 g) by mouth 2 times daily for 30 days, Disp: 900 mL, Rfl: 0    omeprazole (PRILOSEC) 20 MG DR capsule, Take 1 capsule (20 mg) by mouth daily for 30 days, Disp: 30 capsule, Rfl: 0    spironolactone (ALDACTONE) 50 MG tablet, Take 50 mg by mouth daily, Disp: , Rfl:     traMADol (ULTRAM) 50 MG tablet, Take 1 tablet (50 mg) by mouth every 6 hours as needed for severe pain, Disp: 5 tablet, Rfl: 0    traZODone (DESYREL) 50 MG tablet, Take 1 tablet by mouth at bedtime, Disp: , Rfl:     IMPRESSION /  RECOMMENDATIONS   Assessment:  In summary, Mr Ian Crowley is a pleasant 38 year old man who I am seeing in consultation for rectal bleeding.     Recommendations/plan:  1.  Rectal bleeding in a patient with history of internal hemorrhoids  - CT angiogram abdomen pelvis did not show any active GI bleeding.  - Recommend keeping the stool soft with laxatives and fiber such as a combination of Metamucil and MiraLAX twice daily  - Hemoglobin is above transfusion threshold.  - Patient had a colonoscopy done 4 years ago which showed presence of internal hemorrhoids.  - Recommend avoiding NSAIDs.     2.  Spontaneous bacterial peritonitis  - Patient remains on Cipro for SBP prophylaxis.     3.  Recent episode of acute alcohol associated hepatitis  - Patient's bilirubin was 21 earlier this month and has come down to 12 on its own with alcohol cessation.  - Will hold off on initiation of steroids at this point in time.     4.  Decompensated alcohol associated cirrhosis complicated by ascites  MELD 3.0 score is 23 and CTP score is 9/grade B based on labs done on 5/31/2024.  Variceal screening: Patient needs an EGD as an outpatient.  HCC screening: Last ultrasound liver in May 2024 monster not show any hepatocellular carcinoma. Will check alpha-fetoprotein.  Ascites: On lasix 20 mg and spironolactone 50 mg daily as outpatient. Low-salt, high-protein diet emphasized.  Hepatic encephalopathy: not applicable.  Strict alcohol abstinence.  Nutrition: Ensure Max Protein 4 times a day (after each meal and before bedtime).  Will schedule follow up in liver clinic as an outpatient     5. History of gastroesophogeal reflux disease with LA grade D erosive esophagitis  - Recommend daily PPI use.     Gurinder Bardales MD  [facility]  [phone]      This document was created using voice recognition technology. Please excuse any typographical errors that may have occurred, and call with any questions.        Gurinder Bardales MD, FACP    [facility]  [email or web address]       Thank you for the opportunity to participate in the care of this patient.   Please feel free to call with any questions or concerns.  [phone].       CC: [facility], Serg Wagner

## 2024-06-01 NOTE — PLAN OF CARE
Goal Outcome Evaluation:                      Patient discharging to home with no home care services.

## 2024-06-01 NOTE — PLAN OF CARE
NURSING PROGRESS NOTE  Shift Summary      Date: June 1, 2024   [id number]  Neuro/Musculoskeletal:  A&Ox4. Calm and pleasant towards writer and other staff. Receptive to all cares.   Cardiac:  Not on TELE monitoring. Apical pulse regular. Denied and offered no c/o CP. VSS.   Respiratory: LS: CTA, absent of any adventitious sounds. Continued on RA, SpO2: 98% on RA. Denied SOB.   GI/:  BS: normoactive x4Q. Last BM: 5/31. Pt reports at least two bowel movements on 5/31. Continues on scheduled Lactulose BID. Voiding spontaneously without difficulty in bedside urinal. Urine is dark/tea colored.   Diet/Appetite:  Tolerating a 2gm NA diet w/ a 1500ml fluid restriction.   Activity:  Up indep. Steady gait noted.   Pain: C/o abdominal and lower back discomfort. Rest/ repositioning moderately effective. PRN Tramadol moderately effective.   Skin:  No new deficits noted. Jaundiced.   LDAs + Drips/IVF:  PIV x1 to RUE: SL. Dressing CDI  Protocols/Labs:  Not on any electrolyte replacement protocols. Hgb every 12 hours.     Pertinent Shift Updates:  Pt slept comfortably overnight.       Goal Outcome Evaluation:  Problem: Pain Acute  Goal: Optimal Pain Control and Function  Outcome: Progressing  Intervention: Develop Pain Management Plan  Recent Flowsheet Documentation  Taken 6/1/2024 0000 by Christopher Gr RN  Pain Management Interventions:   emotional support   rest   repositioned  Intervention: Prevent or Manage Pain  Recent Flowsheet Documentation  Taken 6/1/2024 0000 by Christopher Gr, RN  Medication Review/Management: medications reviewed  Intervention: Optimize Psychosocial Wellbeing  Recent Flowsheet Documentation  Taken 6/1/2024 0000 by Christopher Gr RN  Supportive Measures:   active listening utilized   verbalization of feelings encouraged     Problem: Fluid Volume Excess  Goal: Fluid Balance  Outcome: Progressing  Intervention: Monitor and Manage Hypervolemia  Recent Flowsheet Documentation  Taken 6/1/2024 0000 by  Christopher Gr, RN  Fluid/Electrolyte Management: fluids restricted     Problem: Anemia  Goal: Anemia Symptom Improvement  Outcome: Progressing  Intervention: Monitor and Manage Anemia  Recent Flowsheet Documentation  Taken 6/1/2024 0000 by Christopher Gr, RN  Safety Promotion/Fall Prevention:   assistive device/personal items within reach   clutter free environment maintained   increased rounding and observation   increase visualization of patient   lighting adjusted   nonskid shoes/slippers when out of bed   patient and family education   room organization consistent   safety round/check completed

## 2024-06-02 LAB — AFP SERPL-MCNC: 3.1 NG/ML

## 2024-06-03 ENCOUNTER — PATIENT OUTREACH (OUTPATIENT)
Dept: CARE COORDINATION | Facility: CLINIC | Age: 39
End: 2024-06-03
Payer: COMMERCIAL

## 2024-06-03 NOTE — PROGRESS NOTES
Clinic Care Coordination Contact  [facility]/Voicemail    Clinical Data: Care Coordinator Outreach    Outreach Documentation Number of Outreach Attempt   6/3/2024   4:15 PM 1       Left message on patient's voicemail with call back information and requested return call.    Plan: Care Coordinator will try to reach patient again in 1-2 business days.    David Myhre, RN   CCC RN

## 2024-06-04 ENCOUNTER — PATIENT OUTREACH (OUTPATIENT)
Dept: CARE COORDINATION | Facility: CLINIC | Age: 39
End: 2024-06-04
Payer: COMMERCIAL

## 2024-06-04 LAB
BACTERIA FLD CULT: NO GROWTH
GRAM STAIN RESULT: NORMAL
GRAM STAIN RESULT: NORMAL

## 2024-06-04 NOTE — PROGRESS NOTES
Clinic Care Coordination Contact  [facility]/Voicemail    Clinical Data: Care Coordinator Outreach    Outreach Documentation Number of Outreach Attempt   6/3/2024   4:15 PM 1   6/4/2024  10:48 AM 2       Left message on patient's voicemail with call back information and requested return call.    Plan: Care Coordinator will send care coordination introduction letter with care coordinator contact information and explanation of care coordination services via [employer]. Care Coordinator will do no further outreaches at this time.    David Myhre, RN   CCC RN

## 2024-06-04 NOTE — LETTER
[facility] CARE COORDINATION  [facility]    June 4, 2024    Ian Crowley  [address] [address] [place] [address]      Dear Ian,    I am a clinic care coordinator who works with Serg Wagner MD with the [facility]. I have been trying to reach you recently to introduce Clinic Care Coordination. Below is a description of clinic care coordination and how I can further assist you.       The clinic care coordination team is made up of a registered nurse, , financial resource worker and community health worker who understand the health care system. The goal of clinic care coordination is to help you manage your health and improve access to the health care system. Our team works alongside your provider to assist you in determining your health and social needs. We can help you obtain health care and community resources, providing you with necessary information and education. We can work with you through any barriers and develop a care plan that helps coordinate and strengthen the communication between you and your care team.  Our services are voluntary and are offered without charge to you personally.    Please feel free to contact me with any questions or concerns regarding care coordination and what we can offer.      We are focused on providing you with the highest-quality healthcare experience possible.    Sincerely,     David Myhre, RN   [facility] RN  [phone]

## 2024-11-09 ENCOUNTER — HEALTH MAINTENANCE LETTER (OUTPATIENT)
Age: 39
End: 2024-11-09

## (undated) DEVICE — GOWN IMPERVIOUS 2XL BLUE

## (undated) DEVICE — ENDO TRAP POLYP E-TRAP 00711099

## (undated) DEVICE — SOL WATER IRRIG 1000ML BOTTLE 2F7114

## (undated) DEVICE — ENDO FORCEP SPIKED SERRATED SHAFT JUMBO 239CM G56998

## (undated) DEVICE — KIT ENDO TURNOVER/PROCEDURE CARRY-ON 101822

## (undated) DEVICE — SPECIMEN CONTAINER 3OZ W/FORMALIN 59901

## (undated) DEVICE — ENDO SNARE EXACTO COLD 9MM LOOP 2.4MMX230CM 00711115

## (undated) DEVICE — SUCTION MANIFOLD NEPTUNE 2 SYS 1 PORT 702-025-000

## (undated) DEVICE — TUBING SUCTION 12"X1/4" N612

## (undated) RX ORDER — FENTANYL CITRATE 50 UG/ML
INJECTION, SOLUTION INTRAMUSCULAR; INTRAVENOUS
Status: DISPENSED
Start: 2020-11-04

## (undated) RX ORDER — LIDOCAINE HYDROCHLORIDE 10 MG/ML
INJECTION, SOLUTION EPIDURAL; INFILTRATION; INTRACAUDAL; PERINEURAL
Status: DISPENSED
Start: 2020-06-26

## (undated) RX ORDER — LIDOCAINE HYDROCHLORIDE 10 MG/ML
INJECTION, SOLUTION EPIDURAL; INFILTRATION; INTRACAUDAL; PERINEURAL
Status: DISPENSED
Start: 2020-06-30

## (undated) RX ORDER — ONDANSETRON 2 MG/ML
INJECTION INTRAMUSCULAR; INTRAVENOUS
Status: DISPENSED
Start: 2020-11-04